# Patient Record
Sex: MALE | Race: WHITE | NOT HISPANIC OR LATINO
[De-identification: names, ages, dates, MRNs, and addresses within clinical notes are randomized per-mention and may not be internally consistent; named-entity substitution may affect disease eponyms.]

---

## 2023-03-06 ENCOUNTER — RESULT REVIEW (OUTPATIENT)
Age: 88
End: 2023-03-06

## 2023-03-07 ENCOUNTER — APPOINTMENT (OUTPATIENT)
Dept: DERMATOLOGY | Facility: CLINIC | Age: 88
End: 2023-03-07
Payer: MEDICARE

## 2023-03-07 PROCEDURE — 17262 DSTRJ MAL LES T/A/L 1.1-2.0: CPT

## 2023-03-07 PROCEDURE — 11102 TANGNTL BX SKIN SINGLE LES: CPT | Mod: 59

## 2023-03-07 PROCEDURE — 99203 OFFICE O/P NEW LOW 30 MIN: CPT | Mod: 25

## 2023-04-12 ENCOUNTER — APPOINTMENT (OUTPATIENT)
Dept: DERMATOLOGY | Facility: CLINIC | Age: 88
End: 2023-04-12
Payer: MEDICARE

## 2023-04-12 DIAGNOSIS — C44.42 SQUAMOUS CELL CARCINOMA OF SKIN OF SCALP AND NECK: ICD-10-CM

## 2023-04-12 PROCEDURE — 12042 INTMD RPR N-HF/GENIT2.6-7.5: CPT

## 2023-04-12 PROCEDURE — 17311 MOHS 1 STAGE H/N/HF/G: CPT

## 2023-04-12 RX ORDER — MUPIROCIN 20 MG/G
2 OINTMENT TOPICAL TWICE DAILY
Qty: 1 | Refills: 2 | Status: ACTIVE | COMMUNITY
Start: 2023-04-12 | End: 1900-01-01

## 2023-07-13 ENCOUNTER — APPOINTMENT (OUTPATIENT)
Dept: ORTHOPEDIC SURGERY | Facility: CLINIC | Age: 88
End: 2023-07-13
Payer: MEDICARE

## 2023-07-13 VITALS
DIASTOLIC BLOOD PRESSURE: 53 MMHG | SYSTOLIC BLOOD PRESSURE: 89 MMHG | HEART RATE: 91 BPM | TEMPERATURE: 97.1 F | WEIGHT: 165 LBS | HEIGHT: 71 IN | BODY MASS INDEX: 23.1 KG/M2

## 2023-07-13 DIAGNOSIS — M47.816 SPONDYLOSIS W/OUT MYELOPATHY OR RADICULOPATHY, LUMBAR REGION: ICD-10-CM

## 2023-07-13 DIAGNOSIS — S32.000A WEDGE COMPRESSION FRACTURE OF UNSPECIFIED LUMBAR VERTEBRA, INITIAL ENCOUNTER FOR CLOSED FRACTURE: ICD-10-CM

## 2023-07-13 PROCEDURE — 72100 X-RAY EXAM L-S SPINE 2/3 VWS: CPT

## 2023-07-13 PROCEDURE — 99204 OFFICE O/P NEW MOD 45 MIN: CPT

## 2023-07-13 NOTE — DISCUSSION/SUMMARY
[de-identified] : I had a great conversation with this gentleman 89-year-old franco has all of his facilities great conversation is accompanied by his son he has spinal stenosis he is Back pain which I think is multifactorial from lumbar degenerative disc disease spinal stenosis and myositis muscle tendon type issues I do not think this L1 1 compression fracture is symptomatic he states his back pain has been unchanged for many many years I would not operate on this at 89 I think the inherent risk of surgery is way too high physical therapy for gait and balance training soft tissue modalities anti-inflammatories under the guidance of his PCP I would also recommend consideration of injection therapy meaning I think trigger point injections facet blocks etc. may just provide this gentleman with a nice quality of life follow-up on a as needed basis

## 2023-07-13 NOTE — HISTORY OF PRESENT ILLNESS
[de-identified] : Very pleasant 89-year-old gentleman presents for evaluation and discussion of chronic low back pain he has been managed by Dr. Yusef Aden spinal stenosis physical therapy injections with mild improvement in his overall signs and symptoms overall biggest limiting factor is his age of 89 is very pleasant gentleman alert orientated x3 involved in orthopedic conversation I have seen a direct this in a nonoperative manner and we took our time to discuss the multifactorial nature of back pain as well as the multifactorial nature of treatment.  Only medication he takes with regard to his back is intermittent Aleve [Stable] : stable [0] : a current pain level of 0/10 [7] : a maximum pain level of 7/10 [Bending] : worsened by bending [Lifting] : worsened by lifting [Heat] : relieved by heat [NSAIDs] : relieved by nonsteroidal anti-inflammatory drugs [Ataxia] : no ataxia [Incontinence] : no incontinence [Loss of Dexterity] : good dexterity [Urinary Ret.] : no urinary retention

## 2023-07-13 NOTE — PHYSICAL EXAM
[Antalgic] : antalgic [de-identified] : CONSTITUTIONAL: The patient is a very pleasant individual who is well-nourished and who appears stated age.\par PSYCHIATRIC: The patient is alert and oriented X 3 and in no apparent distress, and participates with orthopedic evaluation well.\par HEAD: Atraumatic and is nonsyndromic in appearance.\par EENT: No visible thyromegaly, EOMI.\par RESPIRATORY: Respiratory rate is regular, not dyspneic on examination.\par LYMPHATICS: There is no inguinal lymphadenopathy\par INTEGUMENTARY: Skin is clean, dry, and intact about the bilateral lower extremities and lumbar spine.\par VASCULAR: There is brisk capillary refill about the bilateral lower extremities.\par NEUROLOGIC: There are no pathologic reflexes. There is no decrease in sensation of the bilateral lower extremities on Wartenberg pinwheel examination. Deep tendon reflexes are well maintained at 2+/4 of the bilateral lower extremities and are symmetric..\par MUSCULOSKELETAL: There is no visible muscular atrophy. Manual motor strength is well maintained in the bilateral lower extremities. Range of motion of lumbar spine is well maintained. The patient ambulates in a non-myelopathic manner. Negative tension sign and straight leg raise bilaterally. Quad extension, ankle dorsiflexion, EHL, plantar flexion, and ankle eversion are well preserved. Normal secondary orthopaedic exam of bilateral hips, greater trochanteric area, knees and ankles, mild mechanically orientated low back pain as well as neurogenic claudication signs and symptoms [de-identified] : X-rays have been reviewed on today's date July 13, 2023 demonstrates what appears to be an L1 compression fracture.\par \par Previous lumbar MRI has been reviewed that demonstrates multiple levels of lower lumbar spinal stenosis moderate to severe there is no evidence of a compression fracture.

## 2023-08-28 ENCOUNTER — EMERGENCY (EMERGENCY)
Facility: HOSPITAL | Age: 88
LOS: 1 days | Discharge: HOME | End: 2023-08-28
Attending: EMERGENCY MEDICINE
Payer: MEDICARE

## 2023-08-28 VITALS
TEMPERATURE: 98 F | SYSTOLIC BLOOD PRESSURE: 200 MMHG | HEART RATE: 106 BPM | WEIGHT: 173.5 LBS | OXYGEN SATURATION: 99 % | DIASTOLIC BLOOD PRESSURE: 75 MMHG | RESPIRATION RATE: 17 BRPM

## 2023-08-28 VITALS
TEMPERATURE: 98 F | OXYGEN SATURATION: 99 % | DIASTOLIC BLOOD PRESSURE: 97 MMHG | HEART RATE: 71 BPM | RESPIRATION RATE: 17 BRPM | SYSTOLIC BLOOD PRESSURE: 186 MMHG

## 2023-08-28 LAB
ALBUMIN SERPL ELPH-MCNC: 4.1 G/DL — SIGNIFICANT CHANGE UP (ref 3.3–5.2)
ALP SERPL-CCNC: 116 U/L — SIGNIFICANT CHANGE UP (ref 40–120)
ALT FLD-CCNC: 13 U/L — SIGNIFICANT CHANGE UP
ANION GAP SERPL CALC-SCNC: 13 MMOL/L — SIGNIFICANT CHANGE UP (ref 5–17)
AST SERPL-CCNC: 23 U/L — SIGNIFICANT CHANGE UP
BASOPHILS # BLD AUTO: 0.06 K/UL — SIGNIFICANT CHANGE UP (ref 0–0.2)
BASOPHILS NFR BLD AUTO: 0.6 % — SIGNIFICANT CHANGE UP (ref 0–2)
BILIRUB SERPL-MCNC: 0.6 MG/DL — SIGNIFICANT CHANGE UP (ref 0.4–2)
BUN SERPL-MCNC: 18.6 MG/DL — SIGNIFICANT CHANGE UP (ref 8–20)
CALCIUM SERPL-MCNC: 9.6 MG/DL — SIGNIFICANT CHANGE UP (ref 8.4–10.5)
CHLORIDE SERPL-SCNC: 100 MMOL/L — SIGNIFICANT CHANGE UP (ref 96–108)
CO2 SERPL-SCNC: 27 MMOL/L — SIGNIFICANT CHANGE UP (ref 22–29)
CREAT SERPL-MCNC: 0.8 MG/DL — SIGNIFICANT CHANGE UP (ref 0.5–1.3)
EGFR: 79 ML/MIN/1.73M2 — SIGNIFICANT CHANGE UP
EOSINOPHIL # BLD AUTO: 0.06 K/UL — SIGNIFICANT CHANGE UP (ref 0–0.5)
EOSINOPHIL NFR BLD AUTO: 0.6 % — SIGNIFICANT CHANGE UP (ref 0–6)
GLUCOSE SERPL-MCNC: 78 MG/DL — SIGNIFICANT CHANGE UP (ref 70–99)
HCT VFR BLD CALC: 44.2 % — SIGNIFICANT CHANGE UP (ref 39–50)
HGB BLD-MCNC: 14.2 G/DL — SIGNIFICANT CHANGE UP (ref 13–17)
IMM GRANULOCYTES NFR BLD AUTO: 0.5 % — SIGNIFICANT CHANGE UP (ref 0–0.9)
LYMPHOCYTES # BLD AUTO: 1.79 K/UL — SIGNIFICANT CHANGE UP (ref 1–3.3)
LYMPHOCYTES # BLD AUTO: 16.8 % — SIGNIFICANT CHANGE UP (ref 13–44)
MCHC RBC-ENTMCNC: 28.9 PG — SIGNIFICANT CHANGE UP (ref 27–34)
MCHC RBC-ENTMCNC: 32.1 GM/DL — SIGNIFICANT CHANGE UP (ref 32–36)
MCV RBC AUTO: 89.8 FL — SIGNIFICANT CHANGE UP (ref 80–100)
MONOCYTES # BLD AUTO: 1.07 K/UL — HIGH (ref 0–0.9)
MONOCYTES NFR BLD AUTO: 10.1 % — SIGNIFICANT CHANGE UP (ref 2–14)
NEUTROPHILS # BLD AUTO: 7.61 K/UL — HIGH (ref 1.8–7.4)
NEUTROPHILS NFR BLD AUTO: 71.4 % — SIGNIFICANT CHANGE UP (ref 43–77)
PLATELET # BLD AUTO: 284 K/UL — SIGNIFICANT CHANGE UP (ref 150–400)
POTASSIUM SERPL-MCNC: 4.4 MMOL/L — SIGNIFICANT CHANGE UP (ref 3.5–5.3)
POTASSIUM SERPL-SCNC: 4.4 MMOL/L — SIGNIFICANT CHANGE UP (ref 3.5–5.3)
PROT SERPL-MCNC: 7.3 G/DL — SIGNIFICANT CHANGE UP (ref 6.6–8.7)
RBC # BLD: 4.92 M/UL — SIGNIFICANT CHANGE UP (ref 4.2–5.8)
RBC # FLD: 13.4 % — SIGNIFICANT CHANGE UP (ref 10.3–14.5)
SODIUM SERPL-SCNC: 140 MMOL/L — SIGNIFICANT CHANGE UP (ref 135–145)
WBC # BLD: 10.64 K/UL — HIGH (ref 3.8–10.5)
WBC # FLD AUTO: 10.64 K/UL — HIGH (ref 3.8–10.5)

## 2023-08-28 PROCEDURE — 73610 X-RAY EXAM OF ANKLE: CPT

## 2023-08-28 PROCEDURE — 99284 EMERGENCY DEPT VISIT MOD MDM: CPT

## 2023-08-28 PROCEDURE — 80053 COMPREHEN METABOLIC PANEL: CPT

## 2023-08-28 PROCEDURE — 99283 EMERGENCY DEPT VISIT LOW MDM: CPT

## 2023-08-28 PROCEDURE — 85025 COMPLETE CBC W/AUTO DIFF WBC: CPT

## 2023-08-28 PROCEDURE — 73610 X-RAY EXAM OF ANKLE: CPT | Mod: 26,LT

## 2023-08-28 PROCEDURE — 36415 COLL VENOUS BLD VENIPUNCTURE: CPT

## 2023-08-28 RX ADMIN — Medication 100 MILLIGRAM(S): at 17:13

## 2023-08-28 NOTE — ED PROVIDER NOTE - NSFOLLOWUPINSTRUCTIONS_ED_ALL_ED_FT
intact/no unsafe behaviors demonstrated during evaluation
Cellulitis is a skin infection. The infected area is usually warm, red, swollen, and tender. This condition occurs most often in the arms and lower legs. The infection can travel to the muscles, blood, and underlying tissue and become serious. It is very important to get treated for this condition.    What are the causes?  Cellulitis is caused by bacteria. The bacteria enter through a break in the skin, such as a cut, burn, insect bite, open sore, or crack.    What increases the risk?  This condition is more likely to occur in people who:    Have a weak body defense system (immune system).  Have open wounds on the skin, such as cuts, burns, bites, and scrapes. Bacteria can enter the body through these open wounds.  Are older than 60 years of age.  Have diabetes.  Have a type of long-lasting (chronic) liver disease (cirrhosis) or kidney disease.  Are obese.  Have a skin condition such as:    Itchy rash (eczema).  Slow movement of blood in the veins (venous stasis).  Fluid buildup below the skin (edema).  Have had radiation therapy.  Use IV drugs.    What are the signs or symptoms?  Symptoms of this condition include:    Redness, streaking, or spotting on the skin.  Swollen area of the skin.  Tenderness or pain when an area of the skin is touched.  Warm skin.  A fever.  Chills.  Blisters.    How is this diagnosed?  This condition is diagnosed based on a medical history and physical exam. You may also have tests, including:    Blood tests.  Imaging tests.    How is this treated?  Treatment for this condition may include:    Medicines, such as antibiotic medicines or medicines to treat allergies (antihistamines).  Supportive care, such as rest and application of cold or warm cloths (compresses) to the skin.  Hospital care, if the condition is severe.    The infection usually starts to get better within 1–2 days of treatment.    Follow these instructions at home:         Medicines    Take over-the-counter and prescription medicines only as told by your health care provider.  If you were prescribed an antibiotic medicine, take it as told by your health care provider. Do not stop taking the antibiotic even if you start to feel better.        General instructions    Drink enough fluid to keep your urine pale yellow.  Do not touch or rub the infected area.  Raise (elevate) the infected area above the level of your heart while you are sitting or lying down.  Apply warm or cold compresses to the affected area as told by your health care provider.  Keep all follow-up visits as told by your health care provider. This is important. These visits let your health care provider make sure a more serious infection is not developing.    Contact a health care provider if:  You have a fever.  Your symptoms do not begin to improve within 1–2 days of starting treatment.  Your bone or joint underneath the infected area becomes painful after the skin has healed.  Your infection returns in the same area or another area.  You notice a swollen bump in the infected area.  You develop new symptoms.  You have a general ill feeling (malaise) with muscle aches and pains.    Get help right away if:  Your symptoms get worse.  You feel very sleepy.  You develop vomiting or diarrhea that persists.  You notice red streaks coming from the infected area.  Your red area gets larger or turns dark in color.    These symptoms may represent a serious problem that is an emergency. Do not wait to see if the symptoms will go away. Get medical help right away. Call your local emergency services (911 in the [place] Do not drive yourself to the hospital.    Summary  Cellulitis is a skin infection. This condition occurs most often in the arms and lower legs.  Treatment for this condition may include medicines, such as antibiotic medicines or antihistamines.  Take over-the-counter and prescription medicines only as told by your health care provider. If you were prescribed an antibiotic medicine, do not stop taking the antibiotic even if you start to feel better.  Contact a health care provider if your symptoms do not begin to improve within 1–2 days of starting treatment or your symptoms get worse.  Keep all follow-up visits as told by your health care provider. This is important. These visits let your health care provider make sure that a more serious infection is not developing.    ADDITIONAL NOTES AND INSTRUCTIONS    Please follow up with your Primary MD in 24-48 hr.  Seek immediate medical care for any new/worsening signs or symptoms.

## 2023-08-28 NOTE — ED PROVIDER NOTE - OBJECTIVE STATEMENT
Patient is a 89-year-old  male with no significant past medical history presenting with swelling and redness to left ankle.  Symptoms began approximately 1 month ago as a small ulceration which has continued to slowly spread to involve most of the ankle.  Patient denies any trauma, fevers, chills, nausea, vomiting.  Denies any history of diabetes.  No involvement of the foot.  Patient has tried topical bacitracin without significant relief.  Patient has not followed up with primary care doctor after initial evaluation and instructions to start bacitracin.  Patient denies any knee or other joint involvement

## 2023-08-28 NOTE — ED ADULT TRIAGE NOTE - CHIEF COMPLAINT QUOTE
non healing wound to left lower leg ; initial onset in 7/23  + edema , pt reports he does not recall how he got the wound.

## 2023-08-28 NOTE — ED PROVIDER NOTE - SKIN, MLM
LEFT ANKLE:  superficial ulceration surrounded by erythema to anterior ankle.  Surrounding tissue is mildly swollen and warm without tenderness.  No streaking or crepitus noted.  Full range of motion of ankle.  Left foot is neurovascularly intact

## 2023-08-28 NOTE — ED PROVIDER NOTE - CLINICAL SUMMARY MEDICAL DECISION MAKING FREE TEXT BOX
patient is a 89-year-old male presenting with likely cellulitis to ankle.  No signs of joint involvement.  Will obtain baseline labs, x-ray, p.o. antibiotics.  Likely discharge home with strict return precautions and close outpatient follow-up.  Patient very well-appearing

## 2023-08-28 NOTE — ED PROVIDER NOTE - PATIENT PORTAL LINK FT
You can access the FollowMyHealth Patient Portal offered by [facility] by registering at the following website: [email or web address]. By joining [facility]’s FollowMyHealth portal, you will also be able to view your health information using other applications (apps) compatible with our system.

## 2023-08-28 NOTE — ED ADULT NURSE NOTE - OBJECTIVE STATEMENT
Pt is A&Ox3. Respirations are even and unlabored. Color is appropriate for race. Skin warm and dry. Pt reports a left ankle wound. "It started as a small wound and my doctor sent me here since michelle had it for a month". Left ankle appears red and swollen. Pt denies any trauma, fever, nausea or vomiting. ED MD evaluating, plan of care ongoing.

## 2023-10-04 ENCOUNTER — EMERGENCY (EMERGENCY)
Facility: HOSPITAL | Age: 88
LOS: 1 days | Discharge: HOME | End: 2023-10-04
Attending: EMERGENCY MEDICINE
Payer: MEDICARE

## 2023-10-04 VITALS
HEART RATE: 98 BPM | OXYGEN SATURATION: 98 % | SYSTOLIC BLOOD PRESSURE: 155 MMHG | TEMPERATURE: 98 F | RESPIRATION RATE: 20 BRPM | DIASTOLIC BLOOD PRESSURE: 71 MMHG | WEIGHT: 149.25 LBS | HEIGHT: 71 IN

## 2023-10-04 DIAGNOSIS — L03.116 CELLULITIS OF LEFT LOWER LIMB: ICD-10-CM

## 2023-10-04 PROBLEM — Z78.9 OTHER SPECIFIED HEALTH STATUS: Chronic | Status: ACTIVE | Noted: 2023-08-28

## 2023-10-04 PROCEDURE — G0463: CPT

## 2023-10-04 PROCEDURE — 99282 EMERGENCY DEPT VISIT SF MDM: CPT

## 2023-10-04 PROCEDURE — L9995: CPT

## 2023-10-04 NOTE — ED PROVIDER NOTE - CLINICAL SUMMARY MEDICAL DECISION MAKING FREE TEXT BOX
89-year-old male denies past medical history presents ED for wound check. Wound evaluated, appears chronic, weeping, however no active pus drainage or surrounding erythema.  Patient denies any systemic symptoms such as fevers or chills and appears well.  Xeroform applied and wound redressed.  Wound care instructions given.  Follow-up for wound care specialist at [facility] contact information given to patient.  Since patient did not ever take course of Doxy as it was not covered by his insurance, will send course of clindamycin to patient's pharmacy and advised return precautions.

## 2023-10-04 NOTE — ED PROVIDER NOTE - OBJECTIVE STATEMENT
89-year-old male denies past medical history presents ED for wound check.  Son at bedside.  States that he has a chronic left lower extremity wound above the ankle.  States that they went to their PCP today who looked at the wound and told him to go to the ED for an evaluation because a lot worse.  Patient otherwise denies any complaints and states that he feels like his normal self.  Denies any fevers or chills.  States that he cleans and reapply the dressings to the area on a daily basis.  States that he was here within the last couple months for wound check and was Doxy however cannot  from the pharmacy because of this and complains insurance.  Patient otherwise denies any other complaints this time.

## 2023-10-04 NOTE — ED PROVIDER NOTE - PATIENT PORTAL LINK FT
You can access the FollowMyHealth Patient Portal offered by [facility] by registering at the following website: [email or web address]. By joining [facility]’s FollowMyHealth portal, you will also be able to view your health information using other applications (apps) compatible with our system.

## 2023-10-04 NOTE — ED ADULT TRIAGE NOTE - AS TEMP SITE
Consent 3/Introductory Paragraph: I gave the patient a chance to ask questions they had about the procedure.  Following this I explained the Mohs procedure and consent was obtained. The risks, benefits and alternatives to therapy were discussed in detail. Specifically, the risks of infection, scarring, bleeding, prolonged wound healing, incomplete removal, allergy to anesthesia, nerve injury and recurrence were addressed. Prior to the procedure, the treatment site was clearly identified and confirmed by the patient. All components of Universal Protocol/PAUSE Rule completed. oral

## 2023-10-04 NOTE — ED PROVIDER NOTE - PHYSICAL EXAMINATION
General: NAD  Head: NC, AT  EENT: no scleral icterus  Cardiac: no lower extremity edema  Respiratory: no respiratory distress   Abdomen: ND  MSK/Vascular: soft compartments, warm extremities  Neuro: sensation to light touch intact  Psych: calm, cooperative

## 2023-10-04 NOTE — ED PROVIDER NOTE - NS ED ROS FT
Constitutional: no fever, no chills  Head: NC, AT   Eyes: no redness   ENMT: no nasal congestion/drainage, no sore throat   CV: no chest pain, no edema  Resp: no cough, no dyspnea  GI: no abdominal pain, no nausea, no vomiting, no diarrhea  : no dysuria  Skin: + lesions, no rashes   Neuro: no LOC, no headache, no sensory deficits

## 2023-10-23 ENCOUNTER — INPATIENT (INPATIENT)
Facility: HOSPITAL | Age: 88
LOS: 10 days | Discharge: NURSING, REHAB OR LONG-TERM CARE | DRG: 478 | End: 2023-11-03
Attending: STUDENT IN AN ORGANIZED HEALTH CARE EDUCATION/TRAINING PROGRAM | Admitting: STUDENT IN AN ORGANIZED HEALTH CARE EDUCATION/TRAINING PROGRAM
Payer: MEDICARE

## 2023-10-23 VITALS
HEART RATE: 95 BPM | WEIGHT: 160.06 LBS | RESPIRATION RATE: 18 BRPM | HEIGHT: 71 IN | SYSTOLIC BLOOD PRESSURE: 120 MMHG | OXYGEN SATURATION: 95 % | TEMPERATURE: 98 F | DIASTOLIC BLOOD PRESSURE: 63 MMHG

## 2023-10-23 DIAGNOSIS — L03.116 CELLULITIS OF LEFT LOWER LIMB: ICD-10-CM

## 2023-10-23 DIAGNOSIS — Z98.890 OTHER SPECIFIED POSTPROCEDURAL STATES: Chronic | ICD-10-CM

## 2023-10-23 DIAGNOSIS — S81.802A UNSPECIFIED OPEN WOUND, LEFT LOWER LEG, INITIAL ENCOUNTER: ICD-10-CM

## 2023-10-23 DIAGNOSIS — Z29.9 ENCOUNTER FOR PROPHYLACTIC MEASURES, UNSPECIFIED: ICD-10-CM

## 2023-10-23 LAB
ALBUMIN SERPL ELPH-MCNC: 3 G/DL — LOW (ref 3.3–5)
ALBUMIN SERPL ELPH-MCNC: 3 G/DL — LOW (ref 3.3–5)
ALP SERPL-CCNC: 91 U/L — SIGNIFICANT CHANGE UP (ref 40–120)
ALP SERPL-CCNC: 91 U/L — SIGNIFICANT CHANGE UP (ref 40–120)
ALT FLD-CCNC: 23 U/L — SIGNIFICANT CHANGE UP (ref 12–78)
ALT FLD-CCNC: 23 U/L — SIGNIFICANT CHANGE UP (ref 12–78)
ANION GAP SERPL CALC-SCNC: 5 MMOL/L — SIGNIFICANT CHANGE UP (ref 5–17)
ANION GAP SERPL CALC-SCNC: 5 MMOL/L — SIGNIFICANT CHANGE UP (ref 5–17)
APTT BLD: 31.4 SEC — SIGNIFICANT CHANGE UP (ref 24.5–35.6)
APTT BLD: 31.4 SEC — SIGNIFICANT CHANGE UP (ref 24.5–35.6)
AST SERPL-CCNC: 31 U/L — SIGNIFICANT CHANGE UP (ref 15–37)
AST SERPL-CCNC: 31 U/L — SIGNIFICANT CHANGE UP (ref 15–37)
BASOPHILS # BLD AUTO: 0.05 K/UL — SIGNIFICANT CHANGE UP (ref 0–0.2)
BASOPHILS # BLD AUTO: 0.05 K/UL — SIGNIFICANT CHANGE UP (ref 0–0.2)
BASOPHILS NFR BLD AUTO: 0.5 % — SIGNIFICANT CHANGE UP (ref 0–2)
BASOPHILS NFR BLD AUTO: 0.5 % — SIGNIFICANT CHANGE UP (ref 0–2)
BILIRUB SERPL-MCNC: 0.5 MG/DL — SIGNIFICANT CHANGE UP (ref 0.2–1.2)
BILIRUB SERPL-MCNC: 0.5 MG/DL — SIGNIFICANT CHANGE UP (ref 0.2–1.2)
BUN SERPL-MCNC: 20 MG/DL — SIGNIFICANT CHANGE UP (ref 7–23)
BUN SERPL-MCNC: 20 MG/DL — SIGNIFICANT CHANGE UP (ref 7–23)
CALCIUM SERPL-MCNC: 8.9 MG/DL — SIGNIFICANT CHANGE UP (ref 8.5–10.1)
CALCIUM SERPL-MCNC: 8.9 MG/DL — SIGNIFICANT CHANGE UP (ref 8.5–10.1)
CHLORIDE SERPL-SCNC: 107 MMOL/L — SIGNIFICANT CHANGE UP (ref 96–108)
CHLORIDE SERPL-SCNC: 107 MMOL/L — SIGNIFICANT CHANGE UP (ref 96–108)
CO2 SERPL-SCNC: 31 MMOL/L — SIGNIFICANT CHANGE UP (ref 22–31)
CO2 SERPL-SCNC: 31 MMOL/L — SIGNIFICANT CHANGE UP (ref 22–31)
CREAT SERPL-MCNC: 1 MG/DL — SIGNIFICANT CHANGE UP (ref 0.5–1.3)
CREAT SERPL-MCNC: 1 MG/DL — SIGNIFICANT CHANGE UP (ref 0.5–1.3)
EGFR: 68 ML/MIN/1.73M2 — SIGNIFICANT CHANGE UP
EGFR: 68 ML/MIN/1.73M2 — SIGNIFICANT CHANGE UP
EOSINOPHIL # BLD AUTO: 0.05 K/UL — SIGNIFICANT CHANGE UP (ref 0–0.5)
EOSINOPHIL # BLD AUTO: 0.05 K/UL — SIGNIFICANT CHANGE UP (ref 0–0.5)
EOSINOPHIL NFR BLD AUTO: 0.5 % — SIGNIFICANT CHANGE UP (ref 0–6)
EOSINOPHIL NFR BLD AUTO: 0.5 % — SIGNIFICANT CHANGE UP (ref 0–6)
ERYTHROCYTE [SEDIMENTATION RATE] IN BLOOD: 23 MM/HR — HIGH (ref 0–20)
ERYTHROCYTE [SEDIMENTATION RATE] IN BLOOD: 23 MM/HR — HIGH (ref 0–20)
GLUCOSE SERPL-MCNC: 138 MG/DL — HIGH (ref 70–99)
GLUCOSE SERPL-MCNC: 138 MG/DL — HIGH (ref 70–99)
HCT VFR BLD CALC: 40.6 % — SIGNIFICANT CHANGE UP (ref 39–50)
HCT VFR BLD CALC: 40.6 % — SIGNIFICANT CHANGE UP (ref 39–50)
HGB BLD-MCNC: 13 G/DL — SIGNIFICANT CHANGE UP (ref 13–17)
HGB BLD-MCNC: 13 G/DL — SIGNIFICANT CHANGE UP (ref 13–17)
IMM GRANULOCYTES NFR BLD AUTO: 0.6 % — SIGNIFICANT CHANGE UP (ref 0–0.9)
IMM GRANULOCYTES NFR BLD AUTO: 0.6 % — SIGNIFICANT CHANGE UP (ref 0–0.9)
INR BLD: 1.16 RATIO — SIGNIFICANT CHANGE UP (ref 0.85–1.18)
INR BLD: 1.16 RATIO — SIGNIFICANT CHANGE UP (ref 0.85–1.18)
LACTATE SERPL-SCNC: 1.4 MMOL/L — SIGNIFICANT CHANGE UP (ref 0.7–2)
LACTATE SERPL-SCNC: 1.4 MMOL/L — SIGNIFICANT CHANGE UP (ref 0.7–2)
LYMPHOCYTES # BLD AUTO: 1.29 K/UL — SIGNIFICANT CHANGE UP (ref 1–3.3)
LYMPHOCYTES # BLD AUTO: 1.29 K/UL — SIGNIFICANT CHANGE UP (ref 1–3.3)
LYMPHOCYTES # BLD AUTO: 13.9 % — SIGNIFICANT CHANGE UP (ref 13–44)
LYMPHOCYTES # BLD AUTO: 13.9 % — SIGNIFICANT CHANGE UP (ref 13–44)
MCHC RBC-ENTMCNC: 27.6 PG — SIGNIFICANT CHANGE UP (ref 27–34)
MCHC RBC-ENTMCNC: 27.6 PG — SIGNIFICANT CHANGE UP (ref 27–34)
MCHC RBC-ENTMCNC: 32 GM/DL — SIGNIFICANT CHANGE UP (ref 32–36)
MCHC RBC-ENTMCNC: 32 GM/DL — SIGNIFICANT CHANGE UP (ref 32–36)
MCV RBC AUTO: 86.2 FL — SIGNIFICANT CHANGE UP (ref 80–100)
MCV RBC AUTO: 86.2 FL — SIGNIFICANT CHANGE UP (ref 80–100)
MONOCYTES # BLD AUTO: 1.12 K/UL — HIGH (ref 0–0.9)
MONOCYTES # BLD AUTO: 1.12 K/UL — HIGH (ref 0–0.9)
MONOCYTES NFR BLD AUTO: 12.1 % — SIGNIFICANT CHANGE UP (ref 2–14)
MONOCYTES NFR BLD AUTO: 12.1 % — SIGNIFICANT CHANGE UP (ref 2–14)
NEUTROPHILS # BLD AUTO: 6.69 K/UL — SIGNIFICANT CHANGE UP (ref 1.8–7.4)
NEUTROPHILS # BLD AUTO: 6.69 K/UL — SIGNIFICANT CHANGE UP (ref 1.8–7.4)
NEUTROPHILS NFR BLD AUTO: 72.4 % — SIGNIFICANT CHANGE UP (ref 43–77)
NEUTROPHILS NFR BLD AUTO: 72.4 % — SIGNIFICANT CHANGE UP (ref 43–77)
NRBC # BLD: 0 /100 WBCS — SIGNIFICANT CHANGE UP (ref 0–0)
NRBC # BLD: 0 /100 WBCS — SIGNIFICANT CHANGE UP (ref 0–0)
PLATELET # BLD AUTO: 316 K/UL — SIGNIFICANT CHANGE UP (ref 150–400)
PLATELET # BLD AUTO: 316 K/UL — SIGNIFICANT CHANGE UP (ref 150–400)
POTASSIUM SERPL-MCNC: 4.5 MMOL/L — SIGNIFICANT CHANGE UP (ref 3.5–5.3)
POTASSIUM SERPL-MCNC: 4.5 MMOL/L — SIGNIFICANT CHANGE UP (ref 3.5–5.3)
POTASSIUM SERPL-SCNC: 4.5 MMOL/L — SIGNIFICANT CHANGE UP (ref 3.5–5.3)
POTASSIUM SERPL-SCNC: 4.5 MMOL/L — SIGNIFICANT CHANGE UP (ref 3.5–5.3)
PROT SERPL-MCNC: 6.7 G/DL — SIGNIFICANT CHANGE UP (ref 6–8.3)
PROT SERPL-MCNC: 6.7 G/DL — SIGNIFICANT CHANGE UP (ref 6–8.3)
PROTHROM AB SERPL-ACNC: 13.5 SEC — HIGH (ref 9.5–13)
PROTHROM AB SERPL-ACNC: 13.5 SEC — HIGH (ref 9.5–13)
RBC # BLD: 4.71 M/UL — SIGNIFICANT CHANGE UP (ref 4.2–5.8)
RBC # BLD: 4.71 M/UL — SIGNIFICANT CHANGE UP (ref 4.2–5.8)
RBC # FLD: 13.4 % — SIGNIFICANT CHANGE UP (ref 10.3–14.5)
RBC # FLD: 13.4 % — SIGNIFICANT CHANGE UP (ref 10.3–14.5)
SODIUM SERPL-SCNC: 143 MMOL/L — SIGNIFICANT CHANGE UP (ref 135–145)
SODIUM SERPL-SCNC: 143 MMOL/L — SIGNIFICANT CHANGE UP (ref 135–145)
WBC # BLD: 9.26 K/UL — SIGNIFICANT CHANGE UP (ref 3.8–10.5)
WBC # BLD: 9.26 K/UL — SIGNIFICANT CHANGE UP (ref 3.8–10.5)
WBC # FLD AUTO: 9.26 K/UL — SIGNIFICANT CHANGE UP (ref 3.8–10.5)
WBC # FLD AUTO: 9.26 K/UL — SIGNIFICANT CHANGE UP (ref 3.8–10.5)

## 2023-10-23 PROCEDURE — 73630 X-RAY EXAM OF FOOT: CPT | Mod: 26,LT

## 2023-10-23 PROCEDURE — 73590 X-RAY EXAM OF LOWER LEG: CPT | Mod: 26,LT

## 2023-10-23 PROCEDURE — 99222 1ST HOSP IP/OBS MODERATE 55: CPT | Mod: GC

## 2023-10-23 PROCEDURE — 71045 X-RAY EXAM CHEST 1 VIEW: CPT | Mod: 26

## 2023-10-23 PROCEDURE — 99285 EMERGENCY DEPT VISIT HI MDM: CPT

## 2023-10-23 RX ORDER — PIPERACILLIN AND TAZOBACTAM 4; .5 G/20ML; G/20ML
3.38 INJECTION, POWDER, LYOPHILIZED, FOR SOLUTION INTRAVENOUS EVERY 8 HOURS
Refills: 0 | Status: DISCONTINUED | OUTPATIENT
Start: 2023-10-24 | End: 2023-10-24

## 2023-10-23 RX ORDER — PIPERACILLIN AND TAZOBACTAM 4; .5 G/20ML; G/20ML
3.38 INJECTION, POWDER, LYOPHILIZED, FOR SOLUTION INTRAVENOUS ONCE
Refills: 0 | Status: COMPLETED | OUTPATIENT
Start: 2023-10-23 | End: 2023-10-23

## 2023-10-23 RX ORDER — TAMSULOSIN HYDROCHLORIDE 0.4 MG/1
1 CAPSULE ORAL
Refills: 0 | DISCHARGE

## 2023-10-23 RX ORDER — ACETAMINOPHEN 500 MG
650 TABLET ORAL EVERY 6 HOURS
Refills: 0 | Status: DISCONTINUED | OUTPATIENT
Start: 2023-10-23 | End: 2023-10-31

## 2023-10-23 RX ORDER — MELOXICAM 15 MG/1
1 TABLET ORAL
Refills: 0 | DISCHARGE

## 2023-10-23 RX ORDER — VANCOMYCIN HCL 1 G
1000 VIAL (EA) INTRAVENOUS EVERY 12 HOURS
Refills: 0 | Status: DISCONTINUED | OUTPATIENT
Start: 2023-10-23 | End: 2023-10-24

## 2023-10-23 RX ORDER — VANCOMYCIN HCL 1 G
1000 VIAL (EA) INTRAVENOUS ONCE
Refills: 0 | Status: COMPLETED | OUTPATIENT
Start: 2023-10-23 | End: 2023-10-23

## 2023-10-23 RX ORDER — ENOXAPARIN SODIUM 100 MG/ML
40 INJECTION SUBCUTANEOUS EVERY 24 HOURS
Refills: 0 | Status: DISCONTINUED | OUTPATIENT
Start: 2023-10-23 | End: 2023-10-25

## 2023-10-23 RX ORDER — SODIUM CHLORIDE 9 MG/ML
1000 INJECTION INTRAMUSCULAR; INTRAVENOUS; SUBCUTANEOUS ONCE
Refills: 0 | Status: COMPLETED | OUTPATIENT
Start: 2023-10-23 | End: 2023-10-23

## 2023-10-23 RX ADMIN — SODIUM CHLORIDE 1000 MILLILITER(S): 9 INJECTION INTRAMUSCULAR; INTRAVENOUS; SUBCUTANEOUS at 21:48

## 2023-10-23 RX ADMIN — PIPERACILLIN AND TAZOBACTAM 200 GRAM(S): 4; .5 INJECTION, POWDER, LYOPHILIZED, FOR SOLUTION INTRAVENOUS at 21:49

## 2023-10-23 NOTE — ED PROVIDER NOTE - OBJECTIVE STATEMENT
89-year-old male with no significant past medical history presents to the emergency department with son with report of left lower extremity infection.  Patient states that he has had a wound in his left lower leg over the summer, some states that he took him to [facility] emergency department on 8/28/2023, was given a prescription for doxycycline but never took it, patient then returned again to the emergency department 10/4/2023 at which point given the prescription for clindamycin in which she only took 3-4 doses, son states that the last few days has been increased drainage from the wound reported to scented to the emergency department today.

## 2023-10-23 NOTE — H&P ADULT - HISTORY OF PRESENT ILLNESS
89-year-old male with no significant past medical history presents to the emergency department with son with report of left lower extremity infection.  Patient states that he has had a wound in his left lower leg over the summer, some states that he took him to [facility] emergency department on 8/28/2023, was given a prescription for doxycycline but never took it, patient then returned again to the emergency department 10/4/2023 at which point given the prescription for clindamycin in which she only took 3-4 doses, son states that the last few days has been increased drainage from the wound reported to [facility] to the emergency department today.    ED course  Vitals: T 97.7, HR 95, /63, RR 18, SpO2 95% on RA  Significant labs:   Imaging:   EKG:   In ED patient given:    89-year-old male with no significant past medical history presents to the emergency department with son with report of left lower extremity infection.  Patient states that he has had a wound in his left lower leg over the summer, some states that he took him to [facility] emergency department on 8/28/2023, was given a prescription for doxycycline but never took it, patient then returned again to the emergency department 10/4/2023 at which point given the prescription for clindamycin in which she only took 3-4 doses, son states that the last few days has been increased drainage from the wound reported to [facility] to the emergency department today.    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  Patient states that he has had a wound in his left lower leg over the summer, some states that he took him to [facility] emergency department on 8/28/2023. Pt was given a prescription for doxycycline but never took it. Patient then returned again to the emergency department 10/4/2023 at which point given the prescription for clindamycin in which he only took 3-4 doses. Son states that there has been increased drainage from the wound for the last couple of days, brought to the ED for further evaluation. Per pt, he cleans and dresses the wound daily. Has never seen podiatry in the past.     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  Patient states that he has had a wound in his left lower leg over the summer, some states that he took him to [facility] emergency department on 8/28/2023. Pt was given a prescription for doxycycline but never took it. Patient then returned again to the emergency department 10/4/2023 at which point given the prescription for clindamycin in which he only took 3-4 doses. Son states that there has been increased drainage from the wound for the last couple of days, brought to the ED for further evaluation. Per pt, he cleans and dresses the wound daily. Has never seen podiatry in the past.   ED course  Vitals: T 97.7, HR 95, /63, RR 18, SpO2 95% on RA  Significant labs: n/a  Imaging:   CXR wnl on personal read, mild increased vascular congestion; f/u official read   Left Foot XR : no acute fx seen; f/u official read   In ED patient given: Vanc x1, Zosyn x1

## 2023-10-23 NOTE — ED ADULT TRIAGE NOTE - CHIEF COMPLAINT QUOTE
Patient BIB son with c/o left leg wound check. Per son report patient has been seen at [address] for leg ulcers, son reports he was out of town and came on this weekend and patient seemed to have weeping from wound to the point where he is slipping on the floor there is so much drainage. Patient denies complaints.

## 2023-10-23 NOTE — ED PROVIDER NOTE - CLINICAL SUMMARY MEDICAL DECISION MAKING FREE TEXT BOX
89-year-old male with worsening wound of left lower extremity, noncompliant with outpatient antibiotics, to be admitted for IV antibiotics, send CBC, CMP, lactate level, blood cultures, obtain x-rays, EKG and admit.

## 2023-10-23 NOTE — H&P ADULT - PROBLEM SELECTOR PLAN 1
Pt presents to ED for LLE wound   - CXR wnl on personal read, mild increased vascular congestion; f/u official read   - Left Foot XR : no acute fx seen; f/u official read  - s/p Vanc x1, Zosyn x1, NS bolus x1  - continue vanc, zosyn   - f/u MR left foot   - f/u wound cx  - f/u BCX x2  - Podiatry consulted; f/u recs Pt presents to ED for LLE wound which has been present since 7/23.  - CXR wnl on personal read, mild increased vascular congestion; f/u official read   - Left Foot XR : no acute fx seen; f/u official read  - s/p Vanc x1, Zosyn x1, NS bolus x1  - continue vanc, zosyn   - f/u MR left foot   - f/u wound cx  - f/u BCX x2  - Podiatry consulted; f/u recs  - ID consult José Manuel

## 2023-10-23 NOTE — H&P ADULT - NSHPPHYSICALEXAM_GEN_ALL_CORE
T(C): 36.5 (10-23-23 @ 16:57), Max: 36.5 (10-23-23 @ 16:57)  HR: 95 (10-23-23 @ 16:57) (95 - 95)  BP: 120/63 (10-23-23 @ 16:57) (120/63 - 120/63)  RR: 18 (10-23-23 @ 16:57) (18 - 18)  SpO2: 95% (10-23-23 @ 16:57) (95% - 95%)    GENERAL: patient appears well, no acute distress, appropriately interactive  EYES: sclera clear, no exudates  ENMT: oropharynx clear without erythema, no exudates, moist mucous membranes  NECK: supple, soft  LUNGS: good air entry bilaterally, clear to auscultation, symmetric breath sounds, no wheezing or rhonchi appreciated  HEART: soft S1/S2, regular rate and rhythm, no murmurs noted, no lower extremity edema  GASTROINTESTINAL: abdomen is soft, nontender, nondistended, normoactive bowel sounds  INTEGUMENT: good skin turgor, warm skin, appears well perfused  MUSCULOSKELETAL: no clubbing or cyanosis, no obvious deformity  NEUROLOGIC: awake, alert, oriented x3, good muscle tone in all 4 extremities  HEME/LYMPH: no obvious ecchymosis or petechiae T(C): 36.5 (10-23-23 @ 16:57), Max: 36.5 (10-23-23 @ 16:57)  HR: 95 (10-23-23 @ 16:57) (95 - 95)  BP: 120/63 (10-23-23 @ 16:57) (120/63 - 120/63)  RR: 18 (10-23-23 @ 16:57) (18 - 18)  SpO2: 95% (10-23-23 @ 16:57) (95% - 95%)    GENERAL: patient appears well, no acute distress, appropriately interactive  EYES: sclera clear, no exudates  ENMT: oropharynx clear without erythema, no exudates, moist mucous membranes  NECK: supple, soft  LUNGS: good air entry bilaterally, clear to auscultation, symmetric breath sounds, no wheezing or rhonchi appreciated  HEART: soft S1/S2, regular rate and rhythm, no murmurs noted, no lower extremity edema  GASTROINTESTINAL: abdomen is soft, nontender, nondistended, normoactive bowel sounds  INTEGUMENT: good skin turgor, warm skin, appears well perfused  MUSCULOSKELETAL: +LLE macerated, open, draining wound; no clubbing or cyanosis, no obvious deformity  NEUROLOGIC: awake, alert, oriented x3, good muscle tone in all 4 extremities  HEME/LYMPH: no obvious ecchymosis or petechiae T(C): 36.5 (10-23-23 @ 16:57), Max: 36.5 (10-23-23 @ 16:57)  HR: 95 (10-23-23 @ 16:57) (95 - 95)  BP: 120/63 (10-23-23 @ 16:57) (120/63 - 120/63)  RR: 18 (10-23-23 @ 16:57) (18 - 18)  SpO2: 95% (10-23-23 @ 16:57) (95% - 95%)  GENERAL: patient appears well, no acute distress, appropriately interactive  EYES: sclera clear, no exudates  ENMT: oropharynx clear without erythema, no exudates, moist mucous membranes  NECK: supple, soft  LUNGS: good air entry bilaterally, clear to auscultation, symmetric breath sounds, no wheezing or rhonchi appreciated  HEART: soft S1/S2, regular rate and rhythm, no murmurs noted, no lower extremity edema  GASTROINTESTINAL: abdomen is soft, nontender, nondistended, normoactive bowel sounds  INTEGUMENT: good skin turgor, warm skin, appears well perfused  MUSCULOSKELETAL: +LLE macerated, open, draining wound; no clubbing or cyanosis, no obvious deformity  NEUROLOGIC: awake, alert, oriented x3, good muscle tone in all 4 extremities  HEME/LYMPH: no obvious ecchymosis or petechiae

## 2023-10-23 NOTE — H&P ADULT - NSHPSOCIALHISTORY_GEN_ALL_CORE
Tobacco:   EtOH:    Recreational drug use:   Lives with:   Ambulates:   ADLs: Tobacco: denies  EtOH:  denies  Recreational drug use: denies  Lives with: alone  Ambulates: independent; occasional cane/walker use  ADLs: independent

## 2023-10-23 NOTE — H&P ADULT - NSHPREVIEWOFSYSTEMS_GEN_ALL_CORE
Psychiatry Discharge Summary    Admission Date: 10/8/2021     Discharge Date: 10/12/2021    Attending Physician: Dr. Minor Malik    Patient Stated Reason for Admission:                                \"I didn't want to live anymore\"    Discharge Diagnoses:   Major depressive disorder, severe, recurrent, without psychotic features  Generalized Anxiety Disorder  Alcohol Use Disorder    HPI & Hospital Course:     Spring Gomez is a 40 year old Anguillan speaking female, with past psychiatric history of alcohol use disorder, depression, anxiety, post partum depression who presented to [facility] ED on 10/05/21 due to worsening depression and suicidal ideation.  Per the medical records, patient made suicidal statements to her brother who called EMS after she stated she didn't want to live anymore. This was in the context of drinking more over last few months. ED course was non overly contributory: Was tachycardic and hypertensive, . Utox negative. Hgb 11. LFTs normal. Spring was transferred to the medical floor at [facility] for detox. She did not require any ativan while hospitalized and was deemed stable for discharge to inpatient psychiatry.     Upon initial assessment, patient stated she had worsening symptoms of depression for the past few months which brought her into the hospital. She did not remember making suicidal statements but stated she was likely intoxicated at the time. Per chart review she was texting multiple family members she didn't want to live anymore. She denied having any plan and was glad to be alive. She felt she reached out to family members for help and denied wanting to act on SI at that time. She denied any thoughts of SI in the past, SA in the past, no family hx of Suicide. She reportedly came to the ED for alcohol detox because she felt her drinking had worsened over last few months, self medicating for symptoms of anxiety/depression with alcohol, and wanted help. She  did note a history of post partum depression 20 years prior and felt that counseling at that time helped greatly.      Patient stated she had worsening anxiety/depression that began about 10 years prior when her mother passed away, then about 5 years prior when she was . This had caused a great deal of stress recently because of the legal precedings and financial strain. She reported working two jobs then coming home and trying to sleep but instead felt extremely anxious and would self medicate with alcohol. Patient felt the issues with depression/anxiety predated alcohol use. She reported a month and a half of sobriety when the COVID-19 pandemic began as she was exercising consistently. Afterwards she had little work so began drinking again.     Per chart review, collateral information was obtained at [facility] from patients brother, Miller Grover Number , stated pt had been texting and telling numerous relatives she did not want to live anymore and had been more suicidal, had been drinking more recently and more depressed. He had safety concerns at that time.    Spring was admitted to 5th Floor, Inpatient Psychiatry, under [facility] psychiatry. Suicide Precautions placed given the history and risk factors. Hospitalist was consulted for medical management. Individual and group psychotherapy were started which patient tolerated well. Medication options were discussed and after informed consent was given, pt was amenable to initiation and optimization. Mirtazapine was increased to 30mg at bedtime, gabapentin to 300mg TID, and naltrexone started at 25mg at bedtime with plan to finish the titration as an outpatient. Pt's symptoms improved over the course of admission. Mood and depressive symptoms improved, sleep/appetite improved. Denies SI/HI/intent/plan during course of admission. Stable for discharge on 10/12/2021    Consults: Hospitalist    OBJECTIVE:     Vitals:      Visit Vitals  /85   Pulse 83    Temp 98.1 °F (36.7 °C) (Oral)   Resp 16   Ht 5' (1.524 m)   Wt 56.8 kg (125 lb 3.5 oz)   LMP 09/05/2021 (Approximate)   SpO2 98%   BMI 24.46 kg/m²       Recent Labs:      Allergies:  ALLERGIES:  No Known Allergies    Discharge medication list:         Summary of your Discharge Medications      Take these Medications      Details   gabapentin 300 MG capsule  Commonly known as: NEURONTIN   Take 1 capsule by mouth 3 times daily.     mirtazapine 30 MG tablet  Commonly known as: REMERON   Take 1 tablet by mouth nightly.     nalTREXone 50 MG tablet  Commonly known as: REVIA   Take 1 tablet by mouth at bedtime.             Discharge/Mental Status Exam:  Appearance- Appears stated age, good grooming and hygiene  Behavior/Attitude- Calm, cooperative. Good eye contact  Motor- No psychomotor agitation/retardation, no tics/tremors or other abnormal movements.   Speech- Regular rate and rhythm, normal tone, non-pressured.   Mood- so much better  Affect- euthymic, normal range, non labile. Congruent. Appropriate  Thought Process- Linear, logical, goal-directed.   Thought Content- Denies SI, HI, paranoid or delusional ideation.   Perceptions - No evidence of response to internal stimuli. Denies, and no evidence of, AVH.   Insight- Good  Judgment-Good  General Cognition- A&Ox3, good concentration, memory intact. Good fund of knowledge. Good functional knowledge.     Discharge Instructions:  Patient was seen on day of discharge. Patient denied suicidality or safety concerns. Patient instructed to seek emergent psychiatric evaluation given occurrence of suicidality or other acute presentation of psychiatric illness. Patient voiced understanding. Patient's brother was called on 10/11/2021 who reported no safety concerns with patient's discharge. Patient plans psychiatric follow up with [facility] and was offered assistance to schedule an intake. A 30 day supply of vin medications were given to the patient.      Minor Malik D.O.  [facility]  Please perfectserve with questions    This communication was assisted with Dragon assisted speech recognition. If there are any typographical errors or ambiguities in the dictation please contact the provider for further information or correction.    Over 30 minutes were spent on the discharge related services on day of discharge     CONSTITUTIONAL: denies fever, chills, fatigue, weakness  HEENT: denies blurred vision, sore throat  SKIN: denies new lesions, rash  CARDIOVASCULAR: denies chest pain, chest pressure, palpitations  RESPIRATORY: denies shortness of breath, cough, sputum production  GASTROINTESTINAL: denies nausea, vomiting, diarrhea, abdominal pain, melena or hematochezia  GENITOURINARY: denies dysuria, discharge  NEUROLOGICAL: denies numbness, headache, focal weakness  MUSCULOSKELETAL: denies new joint pain, muscle aches  HEMATOLOGIC: denies gross bleeding, bruising (1) obesity (BMI greater than 25) CONSTITUTIONAL: denies fever, chills, fatigue, weakness  HEENT: denies blurred vision, sore throat  SKIN: denies new lesions, rash  CARDIOVASCULAR: denies chest pain, chest pressure, palpitations  RESPIRATORY: denies shortness of breath, cough, sputum production  GASTROINTESTINAL: denies nausea, vomiting, diarrhea, abdominal pain, melena or hematochezia  GENITOURINARY: denies dysuria, discharge  NEUROLOGICAL: denies numbness, headache, focal weakness  MUSCULOSKELETAL: +draining left lower extremity wound; denies new joint pain, muscle aches  HEMATOLOGIC: denies gross bleeding, bruising

## 2023-10-23 NOTE — H&P ADULT - ASSESSMENT
89-year-old male with PMHx of SCC presents to the emergency department with son with report of left lower extremity infection.  Admitted for LLE wound; r/o OM.

## 2023-10-23 NOTE — H&P ADULT - ATTENDING COMMENTS
89-year-old male with PMHx of SCC presents to the emergency department with son with report of left lower extremity infection.  Admitted for LLE wound; r/o OM.    Agree with above. Edited where appropriate

## 2023-10-24 DIAGNOSIS — S91.002A UNSPECIFIED OPEN WOUND, LEFT ANKLE, INITIAL ENCOUNTER: ICD-10-CM

## 2023-10-24 DIAGNOSIS — M48.061 SPINAL STENOSIS, LUMBAR REGION WITHOUT NEUROGENIC CLAUDICATION: ICD-10-CM

## 2023-10-24 DIAGNOSIS — R73.03 PREDIABETES: ICD-10-CM

## 2023-10-24 DIAGNOSIS — S91.109A UNSPECIFIED OPEN WOUND OF UNSPECIFIED TOE(S) WITHOUT DAMAGE TO NAIL, INITIAL ENCOUNTER: ICD-10-CM

## 2023-10-24 DIAGNOSIS — S91.302A UNSPECIFIED OPEN WOUND, LEFT FOOT, INITIAL ENCOUNTER: ICD-10-CM

## 2023-10-24 DIAGNOSIS — D72.829 ELEVATED WHITE BLOOD CELL COUNT, UNSPECIFIED: ICD-10-CM

## 2023-10-24 DIAGNOSIS — N40.0 BENIGN PROSTATIC HYPERPLASIA WITHOUT LOWER URINARY TRACT SYMPTOMS: ICD-10-CM

## 2023-10-24 LAB
A1C WITH ESTIMATED AVERAGE GLUCOSE RESULT: 6.3 % — HIGH (ref 4–5.6)
A1C WITH ESTIMATED AVERAGE GLUCOSE RESULT: 6.3 % — HIGH (ref 4–5.6)
ALBUMIN SERPL ELPH-MCNC: 2.7 G/DL — LOW (ref 3.3–5)
ALBUMIN SERPL ELPH-MCNC: 2.7 G/DL — LOW (ref 3.3–5)
ALP SERPL-CCNC: 76 U/L — SIGNIFICANT CHANGE UP (ref 40–120)
ALP SERPL-CCNC: 76 U/L — SIGNIFICANT CHANGE UP (ref 40–120)
ALT FLD-CCNC: 20 U/L — SIGNIFICANT CHANGE UP (ref 12–78)
ALT FLD-CCNC: 20 U/L — SIGNIFICANT CHANGE UP (ref 12–78)
ANION GAP SERPL CALC-SCNC: 4 MMOL/L — LOW (ref 5–17)
ANION GAP SERPL CALC-SCNC: 4 MMOL/L — LOW (ref 5–17)
AST SERPL-CCNC: 26 U/L — SIGNIFICANT CHANGE UP (ref 15–37)
AST SERPL-CCNC: 26 U/L — SIGNIFICANT CHANGE UP (ref 15–37)
BASOPHILS # BLD AUTO: 0.05 K/UL — SIGNIFICANT CHANGE UP (ref 0–0.2)
BASOPHILS # BLD AUTO: 0.05 K/UL — SIGNIFICANT CHANGE UP (ref 0–0.2)
BASOPHILS NFR BLD AUTO: 0.4 % — SIGNIFICANT CHANGE UP (ref 0–2)
BASOPHILS NFR BLD AUTO: 0.4 % — SIGNIFICANT CHANGE UP (ref 0–2)
BILIRUB SERPL-MCNC: 0.7 MG/DL — SIGNIFICANT CHANGE UP (ref 0.2–1.2)
BILIRUB SERPL-MCNC: 0.7 MG/DL — SIGNIFICANT CHANGE UP (ref 0.2–1.2)
BUN SERPL-MCNC: 18 MG/DL — SIGNIFICANT CHANGE UP (ref 7–23)
BUN SERPL-MCNC: 18 MG/DL — SIGNIFICANT CHANGE UP (ref 7–23)
CALCIUM SERPL-MCNC: 8.6 MG/DL — SIGNIFICANT CHANGE UP (ref 8.5–10.1)
CALCIUM SERPL-MCNC: 8.6 MG/DL — SIGNIFICANT CHANGE UP (ref 8.5–10.1)
CHLORIDE SERPL-SCNC: 110 MMOL/L — HIGH (ref 96–108)
CHLORIDE SERPL-SCNC: 110 MMOL/L — HIGH (ref 96–108)
CO2 SERPL-SCNC: 29 MMOL/L — SIGNIFICANT CHANGE UP (ref 22–31)
CO2 SERPL-SCNC: 29 MMOL/L — SIGNIFICANT CHANGE UP (ref 22–31)
CREAT SERPL-MCNC: 1 MG/DL — SIGNIFICANT CHANGE UP (ref 0.5–1.3)
CREAT SERPL-MCNC: 1 MG/DL — SIGNIFICANT CHANGE UP (ref 0.5–1.3)
CRP SERPL-MCNC: 41 MG/L — HIGH
CRP SERPL-MCNC: 41 MG/L — HIGH
EGFR: 68 ML/MIN/1.73M2 — SIGNIFICANT CHANGE UP
EGFR: 68 ML/MIN/1.73M2 — SIGNIFICANT CHANGE UP
EOSINOPHIL # BLD AUTO: 0.1 K/UL — SIGNIFICANT CHANGE UP (ref 0–0.5)
EOSINOPHIL # BLD AUTO: 0.1 K/UL — SIGNIFICANT CHANGE UP (ref 0–0.5)
EOSINOPHIL NFR BLD AUTO: 0.8 % — SIGNIFICANT CHANGE UP (ref 0–6)
EOSINOPHIL NFR BLD AUTO: 0.8 % — SIGNIFICANT CHANGE UP (ref 0–6)
ESTIMATED AVERAGE GLUCOSE: 134 MG/DL — HIGH (ref 68–114)
ESTIMATED AVERAGE GLUCOSE: 134 MG/DL — HIGH (ref 68–114)
GLUCOSE SERPL-MCNC: 133 MG/DL — HIGH (ref 70–99)
GLUCOSE SERPL-MCNC: 133 MG/DL — HIGH (ref 70–99)
HCT VFR BLD CALC: 37.2 % — LOW (ref 39–50)
HCT VFR BLD CALC: 37.2 % — LOW (ref 39–50)
HGB BLD-MCNC: 12 G/DL — LOW (ref 13–17)
HGB BLD-MCNC: 12 G/DL — LOW (ref 13–17)
IMM GRANULOCYTES NFR BLD AUTO: 0.4 % — SIGNIFICANT CHANGE UP (ref 0–0.9)
IMM GRANULOCYTES NFR BLD AUTO: 0.4 % — SIGNIFICANT CHANGE UP (ref 0–0.9)
LYMPHOCYTES # BLD AUTO: 1.42 K/UL — SIGNIFICANT CHANGE UP (ref 1–3.3)
LYMPHOCYTES # BLD AUTO: 1.42 K/UL — SIGNIFICANT CHANGE UP (ref 1–3.3)
LYMPHOCYTES # BLD AUTO: 11.8 % — LOW (ref 13–44)
LYMPHOCYTES # BLD AUTO: 11.8 % — LOW (ref 13–44)
MCHC RBC-ENTMCNC: 27.7 PG — SIGNIFICANT CHANGE UP (ref 27–34)
MCHC RBC-ENTMCNC: 27.7 PG — SIGNIFICANT CHANGE UP (ref 27–34)
MCHC RBC-ENTMCNC: 32.3 GM/DL — SIGNIFICANT CHANGE UP (ref 32–36)
MCHC RBC-ENTMCNC: 32.3 GM/DL — SIGNIFICANT CHANGE UP (ref 32–36)
MCV RBC AUTO: 85.9 FL — SIGNIFICANT CHANGE UP (ref 80–100)
MCV RBC AUTO: 85.9 FL — SIGNIFICANT CHANGE UP (ref 80–100)
MONOCYTES # BLD AUTO: 1.46 K/UL — HIGH (ref 0–0.9)
MONOCYTES # BLD AUTO: 1.46 K/UL — HIGH (ref 0–0.9)
MONOCYTES NFR BLD AUTO: 12.1 % — SIGNIFICANT CHANGE UP (ref 2–14)
MONOCYTES NFR BLD AUTO: 12.1 % — SIGNIFICANT CHANGE UP (ref 2–14)
NEUTROPHILS # BLD AUTO: 8.99 K/UL — HIGH (ref 1.8–7.4)
NEUTROPHILS # BLD AUTO: 8.99 K/UL — HIGH (ref 1.8–7.4)
NEUTROPHILS NFR BLD AUTO: 74.5 % — SIGNIFICANT CHANGE UP (ref 43–77)
NEUTROPHILS NFR BLD AUTO: 74.5 % — SIGNIFICANT CHANGE UP (ref 43–77)
NRBC # BLD: 0 /100 WBCS — SIGNIFICANT CHANGE UP (ref 0–0)
NRBC # BLD: 0 /100 WBCS — SIGNIFICANT CHANGE UP (ref 0–0)
PLATELET # BLD AUTO: 288 K/UL — SIGNIFICANT CHANGE UP (ref 150–400)
PLATELET # BLD AUTO: 288 K/UL — SIGNIFICANT CHANGE UP (ref 150–400)
POTASSIUM SERPL-MCNC: 4.5 MMOL/L — SIGNIFICANT CHANGE UP (ref 3.5–5.3)
POTASSIUM SERPL-MCNC: 4.5 MMOL/L — SIGNIFICANT CHANGE UP (ref 3.5–5.3)
POTASSIUM SERPL-SCNC: 4.5 MMOL/L — SIGNIFICANT CHANGE UP (ref 3.5–5.3)
POTASSIUM SERPL-SCNC: 4.5 MMOL/L — SIGNIFICANT CHANGE UP (ref 3.5–5.3)
PROT SERPL-MCNC: 5.8 G/DL — LOW (ref 6–8.3)
PROT SERPL-MCNC: 5.8 G/DL — LOW (ref 6–8.3)
RBC # BLD: 4.33 M/UL — SIGNIFICANT CHANGE UP (ref 4.2–5.8)
RBC # BLD: 4.33 M/UL — SIGNIFICANT CHANGE UP (ref 4.2–5.8)
RBC # FLD: 13.5 % — SIGNIFICANT CHANGE UP (ref 10.3–14.5)
RBC # FLD: 13.5 % — SIGNIFICANT CHANGE UP (ref 10.3–14.5)
SODIUM SERPL-SCNC: 143 MMOL/L — SIGNIFICANT CHANGE UP (ref 135–145)
SODIUM SERPL-SCNC: 143 MMOL/L — SIGNIFICANT CHANGE UP (ref 135–145)
WBC # BLD: 12.07 K/UL — HIGH (ref 3.8–10.5)
WBC # BLD: 12.07 K/UL — HIGH (ref 3.8–10.5)
WBC # FLD AUTO: 12.07 K/UL — HIGH (ref 3.8–10.5)
WBC # FLD AUTO: 12.07 K/UL — HIGH (ref 3.8–10.5)

## 2023-10-24 PROCEDURE — 99221 1ST HOSP IP/OBS SF/LOW 40: CPT

## 2023-10-24 PROCEDURE — 93010 ELECTROCARDIOGRAM REPORT: CPT

## 2023-10-24 PROCEDURE — 99233 SBSQ HOSP IP/OBS HIGH 50: CPT

## 2023-10-24 PROCEDURE — 93926 LOWER EXTREMITY STUDY: CPT | Mod: 26,LT

## 2023-10-24 RX ORDER — LANOLIN ALCOHOL/MO/W.PET/CERES
5 CREAM (GRAM) TOPICAL ONCE
Refills: 0 | Status: COMPLETED | OUTPATIENT
Start: 2023-10-24 | End: 2023-10-24

## 2023-10-24 RX ORDER — CEFEPIME 1 G/1
2000 INJECTION, POWDER, FOR SOLUTION INTRAMUSCULAR; INTRAVENOUS EVERY 12 HOURS
Refills: 0 | Status: DISCONTINUED | OUTPATIENT
Start: 2023-10-24 | End: 2023-10-26

## 2023-10-24 RX ORDER — TAMSULOSIN HYDROCHLORIDE 0.4 MG/1
0.4 CAPSULE ORAL AT BEDTIME
Refills: 0 | Status: DISCONTINUED | OUTPATIENT
Start: 2023-10-24 | End: 2023-10-31

## 2023-10-24 RX ORDER — VANCOMYCIN HCL 1 G
1000 VIAL (EA) INTRAVENOUS EVERY 24 HOURS
Refills: 0 | Status: DISCONTINUED | OUTPATIENT
Start: 2023-10-24 | End: 2023-10-26

## 2023-10-24 RX ADMIN — ENOXAPARIN SODIUM 40 MILLIGRAM(S): 100 INJECTION SUBCUTANEOUS at 06:39

## 2023-10-24 RX ADMIN — Medication 250 MILLIGRAM(S): at 21:24

## 2023-10-24 RX ADMIN — Medication 250 MILLIGRAM(S): at 00:20

## 2023-10-24 RX ADMIN — CEFEPIME 100 MILLIGRAM(S): 1 INJECTION, POWDER, FOR SOLUTION INTRAMUSCULAR; INTRAVENOUS at 18:38

## 2023-10-24 RX ADMIN — TAMSULOSIN HYDROCHLORIDE 0.4 MILLIGRAM(S): 0.4 CAPSULE ORAL at 21:24

## 2023-10-24 RX ADMIN — PIPERACILLIN AND TAZOBACTAM 25 GRAM(S): 4; .5 INJECTION, POWDER, LYOPHILIZED, FOR SOLUTION INTRAVENOUS at 06:39

## 2023-10-24 RX ADMIN — Medication 5 MILLIGRAM(S): at 23:32

## 2023-10-24 NOTE — PROGRESS NOTE ADULT - PROBLEM SELECTOR PLAN 1
LLE cellulitis, significant drainage, circumferentially affected the ankle  - change abx to cefepime (from zosyn), cont vanco - monitor closely for toxicity, monitor lytes and renal indices closely  - elevated inflammatory markers  - MR L foot is ordered  - f/u wound cx - collected and pending in the lab  - f/u BCX x2  - podiatry and ID to assess

## 2023-10-24 NOTE — CONSULT NOTE ADULT - PROBLEM SELECTOR RECOMMENDATION 9
Patient examined and evaluated at this time.  Patient will require surgical intervention in the operating room for debridement of the left lower extremity wounds.  We will discuss with patient's family regarding expectations of treatment outcomes.  Will await MRI of the left foot and ankle to rule out osteomyelitis.  Patient remains high risk for infection, sepsis, amputation, limb loss, death.  Patient will require vascular consultation for evaluation for viability of the left lower extremity.

## 2023-10-24 NOTE — ED ADULT NURSE REASSESSMENT NOTE - NS ED NURSE REASSESS COMMENT FT1
Pt received in bed alert and resting in bed. Pt report received from previous nurse. Pt admitted and safety maintained. Nursing care ongoing and safety maintained.

## 2023-10-24 NOTE — ED ADULT NURSE NOTE - NSFALLHARMRISKINTERV_ED_ALL_ED
Assistance OOB with selected safe patient handling equipment if applicable/Communicate risk of Fall with Harm to all staff, patient, and family/Monitor gait and stability/Provide patient with walking aids/Provide visual cue: red socks, yellow wristband, yellow gown, etc/Reinforce activity limits and safety measures with patient and family/Bed in lowest position, wheels locked, appropriate side rails in place/Call bell, personal items and telephone in reach/Instruct patient to call for assistance before getting out of bed/chair/stretcher/Non-slip footwear applied when patient is off stretcher/[place] to call system/Physically safe environment - no spills, clutter or unnecessary equipment/Purposeful Proactive Rounding/Room/bathroom lighting operational, light cord in reach

## 2023-10-24 NOTE — ED ADULT NURSE REASSESSMENT NOTE - NSFALLHARMRISKINTERV_ED_ALL_ED

## 2023-10-24 NOTE — PROGRESS NOTE ADULT - TIME BILLING
spoke w/ ID  spoke w/ family who will visit later today  educated on MRI plan, abx, lab results including a1c and wbc  ordered arterial dopp - may need vasc  monitor for toxicity of abx  further recs pending course

## 2023-10-24 NOTE — PROGRESS NOTE ADULT - SUBJECTIVE AND OBJECTIVE BOX
Name: ALICIA HERNANDEZ  MRN: [id number]  LOCATION: [facility] 01    ----  Patient is a 89y old  Male who presents with a chief complaint of Left leg wound (23 Oct 2023 22:28)      FROM ADMISSION H+P:   HPI:  89-year-old male with PMHx of SCC presents to the emergency department with son with report of left lower extremity infection     ----  INTERVAL HPI/OVERNIGHT EVENTS: Pt seen and evaluated at the bedside. No acute overnight events occurred. Pt is a limited historian due to significant [place] b/l. The pt states "I've been here for 3 days getting treatment, do you know how much longer it will be until I get some results?" Pt was admitted overnight last night. He denies pain. He states that he's had this wound for a few days. No fever/chills. No CP, palpitations. He is sleepy and did not sleep well on the ED stretcher. No other focal complaint at present.     ----  PAST MEDICAL & SURGICAL HISTORY:  No pertinent past medical history      H/O hernia repair          FAMILY HISTORY:  No pertinent family history in first degree relatives        Allergies    No Known Allergies    Intolerances        ----  REVIEW OF SYSTEMS:  CONSTITUTIONAL: denies fever, chill   HEENT: denies blurred vision, sore throat  CARDIOVASCULAR: denies chest pain, palpitations  RESPIRATORY: denies shortness of breath, sputum production, cough  GASTROINTESTINAL: denies nausea, vomiting - has good appetite  NEUROLOGICAL: denies numbness, headache  MUSCULOSKELETAL: denies new joint pain, muscle aches    ----  PHYSICAL EXAM:  GENERAL: patient appears advanced age, thin  ENMT: oropharynx clear without erythema, moist mucous membranes, good dentition  LUNGS: reduced air entry b/l, no wheezing  HEART: S1/S2, regular rate and rhythm, distant heart sounds  GASTROINTESTINAL: abdomen is soft, thin, nontender, active bowel sounds  MUSCULOSKELETAL: no clubbing or cyanosis, no obvious deformity  INTEGUMENT: there is a large skin defect on medial aspet of L distal shin, there is surrounding erythema, the dressings were removed with sticky thick drainage on the dressing, no odor noted, the surrounding skin around the wound is warm  NEUROLOGIC: awake, alert, readily interactive, good muscle tone in 4 extremities, significantly [place]    T(C): 36.8 (10-24-23 @ 08:08), Max: 36.8 (10-24-23 @ 08:08)  HR: 83 (10-24-23 @ 08:08) (83 - 95)  BP: 132/68 (10-24-23 @ 08:08) (120/63 - 158/54)  RR: 18 (10-24-23 @ 08:08) (18 - 18)  SpO2: 96% (10-24-23 @ 08:08) (95% - 98%)  Wt(kg): --    ----  INTAKE & OUTPUT:  I&O's Summary      LABS:                        12.0   12.07 )-----------( 288      ( 24 Oct 2023 05:16 )             37.2     10-24    143  |  110<H>  |  18  ----------------------------<  133<H>  4.5   |  29  |  1.00    Ca    8.6      24 Oct 2023 05:16    TPro  5.8<L>  /  Alb  2.7<L>  /  TBili  0.7  /  DBili  x   /  AST  26  /  ALT  20  /  AlkPhos  76  10-24    PT/INR - ( 23 Oct 2023 21:50 )   PT: 13.5 sec;   INR: 1.16 ratio      inflammatory markers elevated       PTT - ( 23 Oct 2023 21:50 )  PTT:31.4 sec  Urinalysis Basic - ( 24 Oct 2023 05:16 )    Color: x / Appearance: x / SG: x / pH: x  Gluc: 133 mg/dL / Ketone: x  / Bili: x / Urobili: x   Blood: x / Protein: x / Nitrite: x   Leuk Esterase: x / RBC: x / WBC x   Sq Epi: x / Non Sq Epi: x / Bacteria: x      CAPILLARY BLOOD GLUCOSE              ----  DATA REVIEW:  Personally reviewed:  Vital sign trends: [ x ] yes    [  ] no     [  ] n/a - bp elevated  Laboratory results: [ x ] yes    [  ] no     [  ] n/a - monitor wbc - [id number] today's labs and ordered AM labs to monitor for drug toxicity and to monitor response to interventions  Radiology results: [ x ] yes    [  ] no     [  ] n/a - reviewed tib-fib LLE x-ray no miko defect noted on my read  Microbio results: [  ] yes    [  ] no     [x  ] n/a - wound culture is in the lab pending  Consultant recommendations: [  ] yes    [  ] no     [ x ] n/a - reviewed abx changes with ID    ----  ACTIVE MEDICATIONS:  - ID - cefepime   IVPB 2000 milliGRAM(s) IV Intermittent every 12 hours  - ID - vancomycin  IVPB 1000 milliGRAM(s) IV Intermittent every 12 hours  - MSK - acetaminophen     Tablet .. 650 milliGRAM(s) Oral every 6 hours PRN  - VTE PPX - enoxaparin Injectable 40 milliGRAM(s) SubCutaneous every 24 hours

## 2023-10-24 NOTE — ED ADULT NURSE NOTE - CHIEF COMPLAINT QUOTE
Patient BIB son with c/o left leg wound check. Per son report patient has been seen at [address] for leg ulcers, son reports he was out of town and came on this weekend and patient seemed to have weeping from wound to the point where he is slipping on the floor there is so much drainage. Patient denies complaints.

## 2023-10-24 NOTE — CONSULT NOTE ADULT - ASSESSMENT
Left posterior ankle wound down to skin, subcutaneous tissue, fat, tendon, bone  Left foot submet head 1 and left plantar hallux wound down to skin, subcutaneous tissue, fat

## 2023-10-24 NOTE — PATIENT PROFILE ADULT - FALL HARM RISK - RISK INTERVENTIONS

## 2023-10-24 NOTE — ED ADULT NURSE NOTE - OBJECTIVE STATEMENT
Pt presented to ED for a wound check.  Pt informed by [facility] to come to [place] ED for wound care.  Pt noted to have a large deep open wound to back of left achilles area.  Pt denies any pain or discomfort to area.  No chest pain or SOB.  No n/v/d.  Afebrile.  Maintain comfort and safety.

## 2023-10-24 NOTE — CONSULT NOTE ADULT - ASSESSMENT
89-year-old male with PMHx of SCC, who presented with left lower extremity wound. Concern for infected LLE ulcer, and given appearance and depth of wound concern for deeper underlying infection. Xray showed no subcutaneous emphysema or gross osteomyelitis but MRI pending for further evaluation. WBC slightly increased to 12 but no fever. CRP is 41.    -continue cefepime 2g IV q12h (for CrCl 41)  -decrease vancomycin to 1g IV q24h  -vancomycin AUC based dosing per pharmacy  -follow blood and wound cultures  -Podiatry evaluation  -wound care  -monitor WBC    Thank you for courtesy of this consult.     Will follow.  Discussed with Dr. Amanuel Azar MD  Division of Infectious Diseases   Cell [phone] between 8am and 6pm   After 6pm and weekends please call ID service at [phone].     55 minutes spent on total encounter assessing patient, examination, chart review, counseling and coordinating care by the attending physician/nurse/care manager.

## 2023-10-24 NOTE — ED ADULT NURSE NOTE - CHPI ED NUR SYMPTOMS POS
REVIEW OF SYSTEMS:  Constitutional: fatigue and generalized weakness  Eye Problem(s):glasses or contacts  ENT Problem(s):headaches  Cardiovascular problem(s):lower extremity edema and shortness of breath with exertion  Respiratory problem(s):shortness of breath  Gastro-intestinal problem(s):heartburn or reflux and nausea  Genito-urinary problem(s):incontinence  Musculoskeletal problem(s):back pain and arthritis and muscle and joint pain  Integumentary problem(s):bruising and rash  Neurological problem(s):unsteadiness and feeling like passing out  Psychiatric problem(s):depression and sleep disturbance  Endocrine problem(s):cold intolerance and heat intolerance  Hematologic and/or Lymphatic problem(s):easy bruising and allergies    Patient does not take aspirin.  Patient does not smoke     BLEEDING AT SITE

## 2023-10-25 DIAGNOSIS — I48.91 UNSPECIFIED ATRIAL FIBRILLATION: ICD-10-CM

## 2023-10-25 DIAGNOSIS — R41.0 DISORIENTATION, UNSPECIFIED: ICD-10-CM

## 2023-10-25 LAB
ALBUMIN SERPL ELPH-MCNC: 2.5 G/DL — LOW (ref 3.3–5)
ALBUMIN SERPL ELPH-MCNC: 2.5 G/DL — LOW (ref 3.3–5)
ALP SERPL-CCNC: 72 U/L — SIGNIFICANT CHANGE UP (ref 40–120)
ALP SERPL-CCNC: 72 U/L — SIGNIFICANT CHANGE UP (ref 40–120)
ALT FLD-CCNC: 23 U/L — SIGNIFICANT CHANGE UP (ref 12–78)
ALT FLD-CCNC: 23 U/L — SIGNIFICANT CHANGE UP (ref 12–78)
ANION GAP SERPL CALC-SCNC: 7 MMOL/L — SIGNIFICANT CHANGE UP (ref 5–17)
ANION GAP SERPL CALC-SCNC: 7 MMOL/L — SIGNIFICANT CHANGE UP (ref 5–17)
AST SERPL-CCNC: 31 U/L — SIGNIFICANT CHANGE UP (ref 15–37)
AST SERPL-CCNC: 31 U/L — SIGNIFICANT CHANGE UP (ref 15–37)
BASOPHILS # BLD AUTO: 0.06 K/UL — SIGNIFICANT CHANGE UP (ref 0–0.2)
BASOPHILS # BLD AUTO: 0.06 K/UL — SIGNIFICANT CHANGE UP (ref 0–0.2)
BASOPHILS NFR BLD AUTO: 0.6 % — SIGNIFICANT CHANGE UP (ref 0–2)
BASOPHILS NFR BLD AUTO: 0.6 % — SIGNIFICANT CHANGE UP (ref 0–2)
BILIRUB SERPL-MCNC: 0.8 MG/DL — SIGNIFICANT CHANGE UP (ref 0.2–1.2)
BILIRUB SERPL-MCNC: 0.8 MG/DL — SIGNIFICANT CHANGE UP (ref 0.2–1.2)
BUN SERPL-MCNC: 16 MG/DL — SIGNIFICANT CHANGE UP (ref 7–23)
BUN SERPL-MCNC: 16 MG/DL — SIGNIFICANT CHANGE UP (ref 7–23)
CALCIUM SERPL-MCNC: 8.4 MG/DL — LOW (ref 8.5–10.1)
CALCIUM SERPL-MCNC: 8.4 MG/DL — LOW (ref 8.5–10.1)
CHLORIDE SERPL-SCNC: 106 MMOL/L — SIGNIFICANT CHANGE UP (ref 96–108)
CHLORIDE SERPL-SCNC: 106 MMOL/L — SIGNIFICANT CHANGE UP (ref 96–108)
CO2 SERPL-SCNC: 25 MMOL/L — SIGNIFICANT CHANGE UP (ref 22–31)
CO2 SERPL-SCNC: 25 MMOL/L — SIGNIFICANT CHANGE UP (ref 22–31)
CREAT SERPL-MCNC: 1 MG/DL — SIGNIFICANT CHANGE UP (ref 0.5–1.3)
CREAT SERPL-MCNC: 1 MG/DL — SIGNIFICANT CHANGE UP (ref 0.5–1.3)
EGFR: 68 ML/MIN/1.73M2 — SIGNIFICANT CHANGE UP
EGFR: 68 ML/MIN/1.73M2 — SIGNIFICANT CHANGE UP
EOSINOPHIL # BLD AUTO: 0.07 K/UL — SIGNIFICANT CHANGE UP (ref 0–0.5)
EOSINOPHIL # BLD AUTO: 0.07 K/UL — SIGNIFICANT CHANGE UP (ref 0–0.5)
EOSINOPHIL NFR BLD AUTO: 0.7 % — SIGNIFICANT CHANGE UP (ref 0–6)
EOSINOPHIL NFR BLD AUTO: 0.7 % — SIGNIFICANT CHANGE UP (ref 0–6)
GLUCOSE SERPL-MCNC: 140 MG/DL — HIGH (ref 70–99)
GLUCOSE SERPL-MCNC: 140 MG/DL — HIGH (ref 70–99)
HCT VFR BLD CALC: 36.1 % — LOW (ref 39–50)
HCT VFR BLD CALC: 36.1 % — LOW (ref 39–50)
HGB BLD-MCNC: 11.6 G/DL — LOW (ref 13–17)
HGB BLD-MCNC: 11.6 G/DL — LOW (ref 13–17)
IMM GRANULOCYTES NFR BLD AUTO: 0.5 % — SIGNIFICANT CHANGE UP (ref 0–0.9)
IMM GRANULOCYTES NFR BLD AUTO: 0.5 % — SIGNIFICANT CHANGE UP (ref 0–0.9)
LYMPHOCYTES # BLD AUTO: 1.4 K/UL — SIGNIFICANT CHANGE UP (ref 1–3.3)
LYMPHOCYTES # BLD AUTO: 1.4 K/UL — SIGNIFICANT CHANGE UP (ref 1–3.3)
LYMPHOCYTES # BLD AUTO: 14.5 % — SIGNIFICANT CHANGE UP (ref 13–44)
LYMPHOCYTES # BLD AUTO: 14.5 % — SIGNIFICANT CHANGE UP (ref 13–44)
MAGNESIUM SERPL-MCNC: 1.9 MG/DL — SIGNIFICANT CHANGE UP (ref 1.6–2.6)
MAGNESIUM SERPL-MCNC: 1.9 MG/DL — SIGNIFICANT CHANGE UP (ref 1.6–2.6)
MCHC RBC-ENTMCNC: 27.5 PG — SIGNIFICANT CHANGE UP (ref 27–34)
MCHC RBC-ENTMCNC: 27.5 PG — SIGNIFICANT CHANGE UP (ref 27–34)
MCHC RBC-ENTMCNC: 32.1 GM/DL — SIGNIFICANT CHANGE UP (ref 32–36)
MCHC RBC-ENTMCNC: 32.1 GM/DL — SIGNIFICANT CHANGE UP (ref 32–36)
MCV RBC AUTO: 85.5 FL — SIGNIFICANT CHANGE UP (ref 80–100)
MCV RBC AUTO: 85.5 FL — SIGNIFICANT CHANGE UP (ref 80–100)
MONOCYTES # BLD AUTO: 0.97 K/UL — HIGH (ref 0–0.9)
MONOCYTES # BLD AUTO: 0.97 K/UL — HIGH (ref 0–0.9)
MONOCYTES NFR BLD AUTO: 10.1 % — SIGNIFICANT CHANGE UP (ref 2–14)
MONOCYTES NFR BLD AUTO: 10.1 % — SIGNIFICANT CHANGE UP (ref 2–14)
NEUTROPHILS # BLD AUTO: 7.09 K/UL — SIGNIFICANT CHANGE UP (ref 1.8–7.4)
NEUTROPHILS # BLD AUTO: 7.09 K/UL — SIGNIFICANT CHANGE UP (ref 1.8–7.4)
NEUTROPHILS NFR BLD AUTO: 73.6 % — SIGNIFICANT CHANGE UP (ref 43–77)
NEUTROPHILS NFR BLD AUTO: 73.6 % — SIGNIFICANT CHANGE UP (ref 43–77)
NRBC # BLD: 0 /100 WBCS — SIGNIFICANT CHANGE UP (ref 0–0)
NRBC # BLD: 0 /100 WBCS — SIGNIFICANT CHANGE UP (ref 0–0)
PLATELET # BLD AUTO: 294 K/UL — SIGNIFICANT CHANGE UP (ref 150–400)
PLATELET # BLD AUTO: 294 K/UL — SIGNIFICANT CHANGE UP (ref 150–400)
POTASSIUM SERPL-MCNC: 3.8 MMOL/L — SIGNIFICANT CHANGE UP (ref 3.5–5.3)
POTASSIUM SERPL-MCNC: 3.8 MMOL/L — SIGNIFICANT CHANGE UP (ref 3.5–5.3)
POTASSIUM SERPL-SCNC: 3.8 MMOL/L — SIGNIFICANT CHANGE UP (ref 3.5–5.3)
POTASSIUM SERPL-SCNC: 3.8 MMOL/L — SIGNIFICANT CHANGE UP (ref 3.5–5.3)
PROCALCITONIN SERPL-MCNC: 0.05 NG/ML — SIGNIFICANT CHANGE UP
PROCALCITONIN SERPL-MCNC: 0.05 NG/ML — SIGNIFICANT CHANGE UP
PROT SERPL-MCNC: 5.9 G/DL — LOW (ref 6–8.3)
PROT SERPL-MCNC: 5.9 G/DL — LOW (ref 6–8.3)
RBC # BLD: 4.22 M/UL — SIGNIFICANT CHANGE UP (ref 4.2–5.8)
RBC # BLD: 4.22 M/UL — SIGNIFICANT CHANGE UP (ref 4.2–5.8)
RBC # FLD: 13.6 % — SIGNIFICANT CHANGE UP (ref 10.3–14.5)
RBC # FLD: 13.6 % — SIGNIFICANT CHANGE UP (ref 10.3–14.5)
SODIUM SERPL-SCNC: 138 MMOL/L — SIGNIFICANT CHANGE UP (ref 135–145)
SODIUM SERPL-SCNC: 138 MMOL/L — SIGNIFICANT CHANGE UP (ref 135–145)
VANCOMYCIN TROUGH SERPL-MCNC: 7.4 UG/ML — LOW (ref 10–20)
VANCOMYCIN TROUGH SERPL-MCNC: 7.4 UG/ML — LOW (ref 10–20)
WBC # BLD: 9.64 K/UL — SIGNIFICANT CHANGE UP (ref 3.8–10.5)
WBC # BLD: 9.64 K/UL — SIGNIFICANT CHANGE UP (ref 3.8–10.5)
WBC # FLD AUTO: 9.64 K/UL — SIGNIFICANT CHANGE UP (ref 3.8–10.5)
WBC # FLD AUTO: 9.64 K/UL — SIGNIFICANT CHANGE UP (ref 3.8–10.5)

## 2023-10-25 PROCEDURE — 99223 1ST HOSP IP/OBS HIGH 75: CPT

## 2023-10-25 PROCEDURE — 99232 SBSQ HOSP IP/OBS MODERATE 35: CPT

## 2023-10-25 PROCEDURE — 93010 ELECTROCARDIOGRAM REPORT: CPT | Mod: 76

## 2023-10-25 PROCEDURE — 99233 SBSQ HOSP IP/OBS HIGH 50: CPT

## 2023-10-25 RX ORDER — OLANZAPINE 15 MG/1
2.5 TABLET, FILM COATED ORAL ONCE
Refills: 0 | Status: COMPLETED | OUTPATIENT
Start: 2023-10-25 | End: 2023-10-25

## 2023-10-25 RX ORDER — ENOXAPARIN SODIUM 100 MG/ML
40 INJECTION SUBCUTANEOUS EVERY 24 HOURS
Refills: 0 | Status: COMPLETED | OUTPATIENT
Start: 2023-10-26 | End: 2023-10-30

## 2023-10-25 RX ORDER — OLANZAPINE 15 MG/1
2.5 TABLET, FILM COATED ORAL EVERY 6 HOURS
Refills: 0 | Status: DISCONTINUED | OUTPATIENT
Start: 2023-10-25 | End: 2023-10-31

## 2023-10-25 RX ORDER — ENOXAPARIN SODIUM 100 MG/ML
70 INJECTION SUBCUTANEOUS EVERY 12 HOURS
Refills: 0 | Status: DISCONTINUED | OUTPATIENT
Start: 2023-10-25 | End: 2023-10-25

## 2023-10-25 RX ADMIN — Medication 250 MILLIGRAM(S): at 22:30

## 2023-10-25 RX ADMIN — OLANZAPINE 2.5 MILLIGRAM(S): 15 TABLET, FILM COATED ORAL at 10:51

## 2023-10-25 RX ADMIN — ENOXAPARIN SODIUM 40 MILLIGRAM(S): 100 INJECTION SUBCUTANEOUS at 06:06

## 2023-10-25 RX ADMIN — CEFEPIME 100 MILLIGRAM(S): 1 INJECTION, POWDER, FOR SOLUTION INTRAMUSCULAR; INTRAVENOUS at 06:06

## 2023-10-25 RX ADMIN — OLANZAPINE 2.5 MILLIGRAM(S): 15 TABLET, FILM COATED ORAL at 18:11

## 2023-10-25 RX ADMIN — Medication 1 MILLIGRAM(S): at 15:03

## 2023-10-25 RX ADMIN — CEFEPIME 100 MILLIGRAM(S): 1 INJECTION, POWDER, FOR SOLUTION INTRAMUSCULAR; INTRAVENOUS at 18:11

## 2023-10-25 RX ADMIN — TAMSULOSIN HYDROCHLORIDE 0.4 MILLIGRAM(S): 0.4 CAPSULE ORAL at 22:30

## 2023-10-25 NOTE — DIETITIAN INITIAL EVALUATION ADULT - REASON
pt asleep in side, unable to visualize applicable body areas for exam, staff request not to rouse pt

## 2023-10-25 NOTE — DIETITIAN INITIAL EVALUATION ADULT - PERTINENT LABORATORY DATA
10-25    138  |  106  |  16  ----------------------------<  140<H>  3.8   |  25  |  1.00    Ca    8.4<L>      25 Oct 2023 06:48  Mg     1.9     10-25    TPro  5.9<L>  /  Alb  2.5<L>  /  TBili  0.8  /  DBili  x   /  AST  31  /  ALT  23  /  AlkPhos  72  10-25  A1C with Estimated Average Glucose Result: 6.3 % (10-24-23 @ 05:16)

## 2023-10-25 NOTE — DIETITIAN INITIAL EVALUATION ADULT - ENERGY INTAKE
Per staff, pt preferred to eat a muffin brought by family this morning vs bfst provided.  Did not appear to have any difficulty chewing/swallowing. Fair (50-75%)

## 2023-10-25 NOTE — PROGRESS NOTE ADULT - PROBLEM SELECTOR PLAN 5
intermittently takes meloxicam at home  - will use tylenol for pain for now hgba1c is 6.3  - monitor  - counseling  - no intervention warranted given age normalized  nonhealing wound for at least 3 months  check arterial doppler to eval for PAD  spoke w/ podiatry - poor overall surgical candidate but further plans pending clinical course

## 2023-10-25 NOTE — PROGRESS NOTE ADULT - PROBLEM SELECTOR PLAN 2
takes tamsulosin but family reports nonadherence  - resume 0.4mg qhs pt agitated, not directable, given IM zyprexa for safety  - spoke w/ family  - palliative care also assessed - MOLST form updated - DNR and DNI elevated chadsvasc score  - the pt is poor candidate for long term AC  - until further definitive plans are made, will initiate therapeutic AC with lovenox  - will update the family on risks:benefits and discuss GOC further  - rates controlled  - monitor h/h  - consulted cardio elevated chadsvasc score  - the pt is poor candidate for long term AC - spoke w/ pt's son (HCP) and he agrees that therapeutic AC risk>benefit. will plan for pharmacologic VTE ppx while admitted then will not continue AC at time of dc  - pt does not wish to take any meds - he never took his BP meds historically due to "fatigue" and he will unlikely take any norbert blockers even if recommended - family agrees  - rates controlled at present  - monitor h/h  - spoke w/ cardio about above - conservative tx plan

## 2023-10-25 NOTE — PROGRESS NOTE ADULT - SUBJECTIVE AND OBJECTIVE BOX
[facility]  INFECTIOUS DISEASES - Raphael Georges, [address], [place] [address]  Tel: [phone]     Fax: [phone]  =======================================================    ALICIA HERNANDEZ [id number]    Follow up: No fevers. Patient poor historian, but denies any pain or SOB.    Allergies:  No Known Allergies      Antibiotics:  acetaminophen     Tablet .. 650 milliGRAM(s) Oral every 6 hours PRN  cefepime   IVPB 2000 milliGRAM(s) IV Intermittent every 12 hours  enoxaparin Injectable 70 milliGRAM(s) SubCutaneous every 12 hours  LORazepam   Injectable 1 milliGRAM(s) IV Push once PRN  tamsulosin 0.4 milliGRAM(s) Oral at bedtime  vancomycin  IVPB 1000 milliGRAM(s) IV Intermittent every 24 hours       REVIEW OF SYSTEMS:  Limited as poor historian and hard of hearing, as per HPI     Physical Exam:  ICU Vital Signs Last 24 Hrs  T(C): 36.5 (25 Oct 2023 11:33), Max: 36.5 (25 Oct 2023 11:33)  T(F): 97.7 (25 Oct 2023 11:33), Max: 97.7 (25 Oct 2023 11:33)  HR: 78 (25 Oct 2023 11:33) (72 - 97)  BP: 143/80 (25 Oct 2023 11:33) (137/64 - 157/83)  BP(mean): --  ABP: --  ABP(mean): --  RR: 19 (25 Oct 2023 11:33) (17 - 20)  SpO2: 92% (25 Oct 2023 11:33) (92% - 98%)    O2 Parameters below as of 25 Oct 2023 11:33  Patient On (Oxygen Delivery Method): room air        GEN: NAD  HEENT: normocephalic and atraumatic.   NECK: Supple.   LUNGS: Normal respiratory effort  HEART: Regular rate and rhythm   ABDOMEN: Soft, nontender, and nondistended.    EXTREMITIES: No leg edema.  NEUROLOGIC: Answering some simple questions  SKIN: (+) large ulceration on posterior L lower calf with necrotic tissue      Labs:  10-25    138  |  106  |  16  ----------------------------<  140<H>  3.8   |  25  |  1.00    Ca    8.4<L>      25 Oct 2023 06:48  Mg     1.9     10-25    TPro  5.9<L>  /  Alb  2.5<L>  /  TBili  0.8  /  DBili  x   /  AST  31  /  ALT  23  /  AlkPhos  72  10-25                          11.6   9.64  )-----------( 294      ( 25 Oct 2023 06:48 )             36.1     PT/INR - ( 23 Oct 2023 21:50 )   PT: 13.5 sec;   INR: 1.16 ratio         PTT - ( 23 Oct 2023 21:50 )  PTT:31.4 sec  Urinalysis Basic - ( 25 Oct 2023 06:48 )    Color: x / Appearance: x / SG: x / pH: x  Gluc: 140 mg/dL / Ketone: x  / Bili: x / Urobili: x   Blood: x / Protein: x / Nitrite: x   Leuk Esterase: x / RBC: x / WBC x   Sq Epi: x / Non Sq Epi: x / Bacteria: x      LIVER FUNCTIONS - ( 25 Oct 2023 06:48 )  Alb: 2.5 g/dL / Pro: 5.9 g/dL / ALK PHOS: 72 U/L / ALT: 23 U/L / AST: 31 U/L / GGT: x             RECENT CULTURES:  10-23 @ 21:50 .Blood Blood-Peripheral     No growth at 24 hours              All imaging and data are reviewed.

## 2023-10-25 NOTE — PROGRESS NOTE ADULT - TIME BILLING
spoke w/ ID  spoke w/ family who will visit later today  educated on MRI plan, abx, lab results including a1c and wbc  ordered arterial dopp - may need vasc  monitor for toxicity of abx  further recs pending course GOC  spoke w/ ID and podiatry  spoke w/ family multiple times  spoke w/ palliative care team  spoke w/ MRI tech  reviewed with IDT GOC  new onset af - consulted and spoke w/ cardio  spoke w/ ID and podiatry  spoke w/ family multiple times  spoke w/ palliative care team  spoke w/ MRI tech  reviewed with IDT

## 2023-10-25 NOTE — PROGRESS NOTE ADULT - PROBLEM SELECTOR PLAN 1
LLE cellulitis, significant drainage, circumferentially affected the ankle  - change abx to cefepime (from zosyn), cont vanco - monitor closely for toxicity, monitor lytes and renal indices closely  - elevated inflammatory markers  - MR L foot is ordered  - f/u wound cx - collected and pending in the lab  - f/u BCX x2  - podiatry and ID to assess LLE cellulitis, significant drainage, circumferentially affected the ankle  - spoke w/ ID - cont vancomycin and cefepime - monitor closely for toxicity, monitor lytes and renal indices closely  - elevated inflammatory markers noted  - MR L foot is ordered  - f/u wound cx - collected and pending in the lab  - f/u BCx x2  - podiatry and ID to assess

## 2023-10-25 NOTE — GOALS OF CARE CONVERSATION - ADVANCED CARE PLANNING - CONVERSATION DETAILS
Palliative care team spoke with patient's son Hilario by phone. Reviewed patient's medical and social history as well as events leading to patient's hospitalization. Writer discussed patient's current diagnosis (leg wound, left; dementia; HX SCC), medical condition and management. Son states that he is patient's HCP. Inquired about patient's wishes regarding extent of medical care to be provided including escalation of medical care into the ICU and use of vasopressor support. In addition, the writer inquired about thoughts regarding cardiopulmonary resuscitation, artificial nutrition and hydration including use of feeding tubes and IVF, antibiotics, and further investigative studies such as blood draws and radiology. Son showed insight into medical condition. He states that he would not want resuscitation or intubation for patient. Team recommended completion of MOLST. MOLST form completed with DNR/DNI orders and placed on patient's chart. All questions answered. Psychosocial support provided.

## 2023-10-25 NOTE — CASE MANAGEMENT PROGRESS NOTE - NSCMPROGRESSNOTE_GEN_ALL_CORE
The pt is from home alone and ambulates with a cane, son involved and arranging for [facility] [facility] via [facility], requesting referral to [facility] if indicated upon DC. Pt with L posterior ankle wound being follow by podiatry. Pt for MRI to r/o osteo. Wound is down to the tendon and bone. Will need to go to the OR for a debridement. Please follow for dispo plan. WIll need PTE post surgery to determine dispo. Possible ANGIE pending help and WB status.

## 2023-10-25 NOTE — PROGRESS NOTE ADULT - ASSESSMENT
89-year-old male with PMHx of SCC presents to the emergency department with son with report of left lower extremity infection.  Admitted for LLE wound; r/o OM.       89-year-old male with PMHx of SCC, HTN , spinal stenosis presents to the emergency department with son with report of left lower extremity infection concern for OM now with new onset Afib     Atrial Fibrillation  - p/w Left lower extremity infection, w/u ongoing concerning for OM    - EKG: Afib 96 with PACs   - New onset, unknown duration, per son (bedside), they were told about skipped beats, not Afib,  denies taking AC and rate controlling agent    - [id number] score: 3 (age, hx htn)   - not given advanced age, hx of delirium, he is not   - Cardiac enzymes q8h x2 to r/o ischemia.  - 2 D echo to evaluate function and to r/o thrombus  - Check thyroid panel   - Monitor lytes and replete as needed.    - Given relative hypotension, would favor Digoxin for rate control. AFL is typically difficult to rate control, and will likely require DCCV if she does not spontaneously convert.  - Would benefit from AC, but given alcohol abuse and poor follow up, the risk explained.  The patient noted that he will follow up as outpatient.  WIll therefore give him the benefit of the doubt, and start AC.   - Despite his CHADVaSc = 2+ no AC due to fall risk, noncompliance and ETOH abuse    - Choose norpace for rhythm control with LVOT. Can use amiodarone afterwards for more rhythm control         89-year-old male with PMHx of SCC, HTN , spinal stenosis presents to the emergency department with son with report of left lower extremity infection concern for OM now with new onset Afib     Atrial Fibrillation, cardiac optimization   - p/w Left lower extremity infection, w/u ongoing concerning for OM, podiatry following     - EKG: Afib 96 with PACs   - New onset, unknown duration, per son (bedside), they were told about skipped beats, not Afib denies taking AC and rate controlling agent  - no sign of acute ischemia  - no anginal complaints on exam    - [id number] score: 3 (age, hx htn)   - on low dose Lovenox   - given advanced age, ? compliance with meds, hx of delirium, on enhanced observation for safety, he is poor candidate for long term AC, risks outweighs benefits  - no sign of volume overload, non orthopneic, no O2 requirement   - can obtain 2 D echo to evaluate function and to r/o thrombus     - BP, HR stable and controlled   - per son, he was on losartan however patient stopped taking 2/2 to feeling tired after taking two doses   - continue to monitor routine hemodynamics   - Monitor and replete Lytes. Keep K > 4 and Mg > 2    - Will continue to follow.    Keri Cox [facility]  Nurse Practitioner - Cardiology   call TEAMS     89-year-old male with PMHx of SCC, HTN , spinal stenosis presents to the emergency department with son with report of left lower extremity infection concern for OM now with concern for afib on his EKG.     Abnormal EKG, possible Afib,, cardiac optimization   - p/w Left lower extremity infection, w/u ongoing concerning for OM, podiatry following     - EKG: irregular rhythm, possible Afib vs SR with mobitz type I and PACs  - New onset, unknown duration, per son (bedside), they were told about skipped beats, not Afib denies taking AC and rate controlling agent  - no sign of acute ischemia  - no anginal complaints on exam    - [id number] score: 3 (age, hx htn)   - on full dose Lovenox   - given advanced age, ? compliance with meds, hx of delirium, on enhanced observation for safety, he is poor candidate for long term AC, risks outweighs benefits  - no sign of volume overload, non orthopneic, no O2 requirement   - can obtain 2 D echo to evaluate function and to r/o thrombus     - BP, HR stable and controlled   - per son, he was on losartan however patient stopped taking 2/2 to feeling tired after taking two doses   - continue to monitor routine hemodynamics   - Monitor and replete Lytes. Keep K > 4 and Mg > 2    - Will continue to follow.    Keri Cox [facility]  Nurse Practitioner - Cardiology   call TEAMS

## 2023-10-25 NOTE — DIETITIAN INITIAL EVALUATION ADULT - NSFNSPHYEXAMSKINFT_GEN_A_CORE
Pressure Injury 1: Left:, heel, Stage I  Pressure Injury 2: Right:, heel, Stage I  Pressure Injury 3: none, none  Pressure Injury 4: Left:, first toe, Unstageable, Non-stageable mucosal injury  Pressure Injury 5: plantar, Left:, foot, Unstageable  Pressure Injury 6: buttocks, Right:, Stage II  Pressure Injury 7: Bilateral:, buttocks, on left buttocks and surrounding stage 2 on right buttocks, Stage I

## 2023-10-25 NOTE — PROGRESS NOTE ADULT - PROBLEM SELECTOR PLAN 3
uptrending WBC  nonhealing wound for at least 3 months  check arterial doppler to eval for PAD takes tamsulosin but family reports nonadherence  - resume 0.4mg qhs pt agitated, not directable, given IM zyprexa for safety  - spoke w/ family  - palliative care also assessed - MOLST form updated - DNR and DNI

## 2023-10-25 NOTE — PROGRESS NOTE ADULT - PROBLEM SELECTOR PLAN 6
Lovenox 40mg qd intermittently takes meloxicam at home  - will use tylenol for pain for now hgba1c is 6.3  - monitor  - counseling  - no intervention warranted given age

## 2023-10-25 NOTE — CAREGIVER ENGAGEMENT NOTE - CAREGIVER OUTREACH NOTES - FREE TEXT
CALVIN met with pt's son [phone] who states pt lives alone, was managing well with ADLS prior to admission, son is planning on pvt hire HHA via [facility] and is interested in home care referral to [facility] if indicated.

## 2023-10-25 NOTE — PROGRESS NOTE ADULT - ASSESSMENT
89-year-old male with PMHx of SCC, who presented with left lower extremity wound. Concern for infected LLE ulcer, and given appearance and depth of wound concern for deeper underlying infection. Xray showed no subcutaneous emphysema or gross osteomyelitis but MRI pending for further evaluation.     Remains afebrile and leukocytosis resolved. Baseline CRP is 41. Blood cultures currently no growth.    #LLE infected ulcer  #Leukocytosis    -continue cefepime 2g IV q12h (for CrCl 41)  -continue vancomycin 1g IV q24h pending wound culture  -vancomycin AUC based dosing per pharmacy  -follow cultures to completion  -wound care  -discussed with Dr. Amanuel Azar MD  Division of Infectious Diseases   Cell [phone] between 8am and 6pm   After 6pm and weekends please call ID service at [phone].

## 2023-10-25 NOTE — PROGRESS NOTE ADULT - SUBJECTIVE AND OBJECTIVE BOX
Name: ALICIA HERNANDEZ  MRN: [id number]  LOCATION: [place] [address]    ----  Patient is a 89y old  Male who presents with a chief complaint of Cellulitis of left lower extremity    89-year-old male with PMHx of SCC presents to the emergency department with son with report of left lower extremity infection.  Admitted for LLE wound; r/o OM.   (25 Oct 2023 09:58)      FROM ADMISSION H+P:   HPI:  89-year-old male with PMHx of SCC presents to the emergency department with son with report of left lower extremity infection     ----  INTERVAL HPI/OVERNIGHT EVENTS: Pt seen and evaluated at the bedside. Pt was trying to climb out of bed overnight to urinate. Spoke w/ staff and we agreed that enhanced supervision appropriate due to poorly directable nature of his confusion. He states "I just got off a flight, why you are locking me in here?" When I attempted to direct the patient, he grabbed my arms and my coat to push me out of the way so he could stand up to leave the room. Despite attempts to redirect him, he was not receptive to my counseling. I attempted to show him my badge, show him the nursing station, made eye contact, I was gently reassuring to deescalate the situation but the pt was not receptive to my counseling. We were able to have the pt over the phone speak with him but this was only temporarily effective. Pt is not able to provide reliable history at the present time.     ----  PAST MEDICAL & SURGICAL HISTORY:  No pertinent past medical history      H/O hernia repair          FAMILY HISTORY:  No pertinent family history in first degree relatives        Allergies    No Known Allergies    Intolerances        ----  REVIEW OF SYSTEMS:  pt aggressive with this writer, unable to obtain reliable information from him at this time    ----  PHYSICAL EXAM:  GENERAL: patient appears advanced age, thin, confused  ENMT: oropharynx clear without erythema, moist mucous membranes, good dentition  LUNGS: reduced air entry b/l, no wheezing  HEART: S1/S2, regular rate and rhythm, distant heart sounds  GASTROINTESTINAL: abdomen is soft, thin, nontender, active bowel sounds  MUSCULOSKELETAL: no clubbing or cyanosis, no obvious deformity  INTEGUMENT: there is a large skin defect on medial aspect of L distal shin, dressing soaked through, no odor noted during my assessment  NEUROLOGIC: awake, alert, confused, not following commands, oriented only to person    T(C): 36.5 (10-25-23 @ 11:33), Max: 36.5 (10-25-23 @ 11:33)  HR: 78 (10-25-23 @ 11:33) (72 - 97)  BP: 143/80 (10-25-23 @ 11:33) (137/64 - 157/83)  RR: 19 (10-25-23 @ 11:33) (17 - 20)  SpO2: 92% (10-25-23 @ 11:33) (92% - 98%)  Wt(kg): --    ----  INTAKE & OUTPUT:  I&O's Summary    24 Oct 2023 07:01  -  25 Oct 2023 07:00  --------------------------------------------------------  IN: 300 mL / OUT: 150 mL / NET: 150 mL        LABS:                        11.6   9.64  )-----------( 294      ( 25 Oct 2023 06:48 )             36.1     10-25    138  |  106  |  16  ----------------------------<  140<H>  3.8   |  25  |  1.00    Ca    8.4<L>      25 Oct 2023 06:48  Mg     1.9     10-25    TPro  5.9<L>  /  Alb  2.5<L>  /  TBili  0.8  /  DBili  x   /  AST  31  /  ALT  23  /  AlkPhos  72  10-25    PT/INR - ( 23 Oct 2023 21:50 )   PT: 13.5 sec;   INR: 1.16 ratio         PTT - ( 23 Oct 2023 21:50 )  PTT:31.4 sec  Urinalysis Basic - ( 25 Oct 2023 06:48 )    Color: x / Appearance: x / SG: x / pH: x  Gluc: 140 mg/dL / Ketone: x  / Bili: x / Urobili: x   Blood: x / Protein: x / Nitrite: x   Leuk Esterase: x / RBC: x / WBC x   Sq Epi: x / Non Sq Epi: x / Bacteria: x      CAPILLARY BLOOD GLUCOSE            Culture - Blood (collected 10-23-23 @ 21:50)  Source: .Blood Blood-Peripheral  Preliminary Report (10-25-23 @ 01:02):    No growth at 24 hours    Culture - Blood (collected 10-23-23 @ 21:50)  Source: .Blood Blood-Peripheral  Preliminary Report (10-25-23 @ 01:02):    No growth at 24 hours        ----  DATA REVIEW:  Personally reviewed:  Vital sign trends: [ x ] yes    [  ] no     [  ] n/a  Laboratory results: [ x ] yes    [  ] no     [  ] n/a - wbc normalized, mild normocytic anemia (likely 2/2 AOCD), stable renal indices  Radiology results: [  ] yes    [  ] no     [ x ] n/a - will need premedication for MRI if pt is appropriate for this study toda  Microbio results: [ x ] yes    [  ] no     [  ] n/a - blood cx ngtd  Consultant recommendations: [ x ] yes    [  ] no     [  ] n/a - reviewed with podiatry today            ----  ACTIVE MEDICATIONS:         Name: ALICIA HERNANDEZ  MRN: [id number]  LOCATION: [place] [address]    ----  Patient is a 89y old  Male who presents with a chief complaint of Cellulitis of left lower extremity    89-year-old male with PMHx of SCC presents to the emergency department with son with report of left lower extremity infection.  Admitted for LLE wound; r/o OM.   (25 Oct 2023 09:58)      FROM ADMISSION H+P:   HPI:  89-year-old male with PMHx of SCC presents to the emergency department with son with report of left lower extremity infection     ----  INTERVAL HPI/OVERNIGHT EVENTS: Pt seen and evaluated at the bedside. Pt was trying to climb out of bed overnight to urinate. Spoke w/ staff and we agreed that enhanced supervision appropriate due to poorly directable nature of his confusion. He states "I just got off a flight, why you are locking me in here?" When I attempted to direct the patient, he grabbed my arms and my coat to push me out of the way so he could stand up to leave the room. Despite attempts to redirect him, he was not receptive to my counseling. I attempted to show him my badge, show him the nursing station, made eye contact, I was gently reassuring to deescalate the situation but the pt was not receptive to my counseling. We were able to have the pt's son Hilario over the phone speak with him but this was only temporarily effective. Pt is not able to provide reliable history at the present time.     ----  PAST MEDICAL & SURGICAL HISTORY:  No pertinent past medical history      H/O hernia repair          FAMILY HISTORY:  No pertinent family history in first degree relatives        Allergies    No Known Allergies    Intolerances        ----  REVIEW OF SYSTEMS:  pt aggressive with this writer, unable to obtain reliable information from him at this time    ----  PHYSICAL EXAM:  GENERAL: patient appears advanced age, thin, confused  ENMT: oropharynx clear without erythema, moist mucous membranes, good dentition  LUNGS: reduced air entry b/l, no wheezing  HEART: S1/S2, regular rate and rhythm, distant heart sounds  GASTROINTESTINAL: abdomen is soft, thin, nontender, active bowel sounds  MUSCULOSKELETAL: no clubbing or cyanosis, no obvious deformity  INTEGUMENT: there is a large skin defect on medial aspect of L distal shin, dressing soaked through, no odor noted during my assessment  NEUROLOGIC: awake, alert, confused, not following commands, oriented only to person    T(C): 36.5 (10-25-23 @ 11:33), Max: 36.5 (10-25-23 @ 11:33)  HR: 78 (10-25-23 @ 11:33) (72 - 97)  BP: 143/80 (10-25-23 @ 11:33) (137/64 - 157/83)  RR: 19 (10-25-23 @ 11:33) (17 - 20)  SpO2: 92% (10-25-23 @ 11:33) (92% - 98%)  Wt(kg): --    ----  INTAKE & OUTPUT:  I&O's Summary    24 Oct 2023 07:01  -  25 Oct 2023 07:00  --------------------------------------------------------  IN: 300 mL / OUT: 150 mL / NET: 150 mL        LABS:                        11.6   9.64  )-----------( 294      ( 25 Oct 2023 06:48 )             36.1     10-25    138  |  106  |  16  ----------------------------<  140<H>  3.8   |  25  |  1.00    Ca    8.4<L>      25 Oct 2023 06:48  Mg     1.9     10-25    TPro  5.9<L>  /  Alb  2.5<L>  /  TBili  0.8  /  DBili  x   /  AST  31  /  ALT  23  /  AlkPhos  72  10-25    PT/INR - ( 23 Oct 2023 21:50 )   PT: 13.5 sec;   INR: 1.16 ratio         PTT - ( 23 Oct 2023 21:50 )  PTT:31.4 sec  Urinalysis Basic - ( 25 Oct 2023 06:48 )    Color: x / Appearance: x / SG: x / pH: x  Gluc: 140 mg/dL / Ketone: x  / Bili: x / Urobili: x   Blood: x / Protein: x / Nitrite: x   Leuk Esterase: x / RBC: x / WBC x   Sq Epi: x / Non Sq Epi: x / Bacteria: x      CAPILLARY BLOOD GLUCOSE            Culture - Blood (collected 10-23-23 @ 21:50)  Source: .Blood Blood-Peripheral  Preliminary Report (10-25-23 @ 01:02):    No growth at 24 hours    Culture - Blood (collected 10-23-23 @ 21:50)  Source: .Blood Blood-Peripheral  Preliminary Report (10-25-23 @ 01:02):    No growth at 24 hours        ----  DATA REVIEW:  Personally reviewed:  Vital sign trends: [ x ] yes    [  ] no     [  ] n/a  Laboratory results: [ x ] yes    [  ] no     [  ] n/a - wbc normalized, mild normocytic anemia (likely 2/2 AOCD), stable renal indices  Radiology results: [  ] yes    [  ] no     [ x ] n/a - will need premedication for MRI if pt is appropriate for this study toda  Microbio results: [ x ] yes    [  ] no     [  ] n/a - blood cx ngtd  Consultant recommendations: [ x ] yes    [  ] no     [  ] n/a - reviewed with podiatry today            ----  ACTIVE MEDICATIONS (BY SYSTEM):  -  - tamsulosin 0.4 milliGRAM(s) Oral at bedtime  - ID - cefepime   IVPB 2000 milliGRAM(s) IV Intermittent every 12 hours  - ID - vancomycin  IVPB 1000 milliGRAM(s) IV Intermittent every 24 hours  - MSK - acetaminophen     Tablet .. 650 milliGRAM(s) Oral every 6 hours PRN  - VTE PPX - enoxaparin Injectable 70 milliGRAM(s) SubCutaneous every 12 hours

## 2023-10-25 NOTE — PROGRESS NOTE ADULT - SUBJECTIVE AND OBJECTIVE BOX
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  Patient states that he has had a wound in his left lower leg over the summer, some states that he took him to [facility] emergency department on 8/28/2023. Pt was given a prescription for doxycycline but never took it. Patient then returned again to the emergency department 10/4/2023 at which point given the prescription for clindamycin in which he only took 3-4 doses. Son states that there has been increased drainage from the wound for the last couple of days, brought to the ED for further evaluation. Per pt, he cleans and dresses the wound daily. Has never seen podiatry in the past.   ED course  Vitals: T 97.7, HR 95, /63, RR 18, SpO2 95% on RA  Significant labs: n/a  Imaging:   CXR wnl on personal read, mild increased vascular congestion; f/u official read   Left Foot XR : no acute fx seen; f/u official read   In ED patient given: Vanc x1, Zosyn x1   (23 Oct 2023 22:28)    EKG: AFib with PVCs     REVIEW OF SYSTEMS:   All other review of systems are negative unless indicated above    PAST MEDICAL & SURGICAL HISTORY:  No pertinent past medical history      H/O hernia repair    SOCIAL HISTORY:  No tobacco, ethanol, or drug abuse.    FAMILY HISTORY:  No pertinent family history in first degree relatives      No family history of acute MI or sudden cardiac death.    MEDICATIONS  (STANDING):  cefepime   IVPB 2000 milliGRAM(s) IV Intermittent every 12 hours  enoxaparin Injectable 70 milliGRAM(s) SubCutaneous every 12 hours  tamsulosin 0.4 milliGRAM(s) Oral at bedtime  vancomycin  IVPB 1000 milliGRAM(s) IV Intermittent every 24 hours    MEDICATIONS  (PRN):  acetaminophen     Tablet .. 650 milliGRAM(s) Oral every 6 hours PRN Temp greater or equal to 38C (100.4F), Mild Pain (1 - 3)      Allergies    No Known Allergies    Intolerances        Home meds:  Home Medications:        VITAL SIGNS:   Vital Signs Last 24 Hrs  T(C): 36.5 (25 Oct 2023 11:33), Max: 36.5 (25 Oct 2023 11:33)  T(F): 97.7 (25 Oct 2023 11:33), Max: 97.7 (25 Oct 2023 11:33)  HR: 78 (25 Oct 2023 11:33) (72 - 97)  BP: 143/80 (25 Oct 2023 11:33) (137/64 - 157/83)  BP(mean): --  RR: 19 (25 Oct 2023 11:33) (17 - 20)  SpO2: 92% (25 Oct 2023 11:33) (92% - 98%)    Parameters below as of 25 Oct 2023 11:33  Patient On (Oxygen Delivery Method): room air        I&O's Summary    24 Oct 2023 07:01  -  25 Oct 2023 07:00  --------------------------------------------------------  IN: 300 mL / OUT: 150 mL / NET: 150 mL    On Exam:  TELE: Not on telemetry   Constitutional: NAD, awake and aler  HEENT: Moist Mucous Membranes, Anicteric  Pulmonary: Non-labored, breath sounds are clear bilaterally, No wheezing, rales or rhonchi  Cardiovascular: IRRR, S1 and S2, No murmurs, rubs, gallops or clicks  Gastrointestinal: Bowel Sounds present, soft, nontender.   Lymph: No peripheral edema. No lymphadenopathy.  Skin: No visible rashes or ulcers.  Psych:  Mood & affect appropriate for situation    LABS: All Labs Reviewed:                        11.6   9.64  )-----------( 294      ( 25 Oct 2023 06:48 )             36.1                         12.0   12.07 )-----------( 288      ( 24 Oct 2023 05:16 )             37.2                         13.0   9.26  )-----------( 316      ( 23 Oct 2023 21:50 )             40.6     25 Oct 2023 06:48    138    |  106    |  16     ----------------------------<  140    3.8     |  25     |  1.00   24 Oct 2023 05:16    143    |  110    |  18     ----------------------------<  133    4.5     |  29     |  1.00   23 Oct 2023 21:50    143    |  107    |  20     ----------------------------<  138    4.5     |  31     |  1.00     Ca    8.4        25 Oct 2023 06:48  Ca    8.6        24 Oct 2023 05:16  Ca    8.9        23 Oct 2023 21:50  Mg     1.9       25 Oct 2023 06:48    TPro  5.9    /  Alb  2.5    /  TBili  0.8    /  DBili  x      /  AST  31     /  ALT  23     /  AlkPhos  72     25 Oct 2023 06:48  TPro  5.8    /  Alb  2.7    /  TBili  0.7    /  DBili  x      /  AST  26     /  ALT  20     /  AlkPhos  76     24 Oct 2023 05:16  TPro  6.7    /  Alb  3.0    /  TBili  0.5    /  DBili  x      /  AST  31     /  ALT  23     /  AlkPhos  91     23 Oct 2023 21:50    PT/INR - ( 23 Oct 2023 21:50 )   PT: 13.5 sec;   INR: 1.16 ratio         PTT - ( 23 Oct 2023 21:50 )  PTT:31.4 sec      Blood Culture: Organism --  Gram Stain Blood -- Gram Stain --  Specimen Source .Blood Blood-Peripheral  Culture-Blood --         CHIEF COMPLAINT: Patient is a 89y old  Male who presents with a chief complaint of Left leg wound (25 Oct 2023 12:44)    HPI:  89-year-old male with PMHx of SCC presents to the emergency department with son with report of left lower extremity infection.  Patient states that he has had a wound in his left lower leg over the summer, some states that he took him to [facility] emergency department on 8/28/2023. Pt was given a prescription for doxycycline but never took it. Patient then returned again to the emergency department 10/4/2023 at which point given the prescription for clindamycin in which he only took 3-4 doses. Son states that there has been increased drainage from the wound for the last couple of days, brought to the ED for further evaluation. Per pt, he cleans and dresses the wound daily. Has never seen podiatry in the past.   ED course  Vitals: T 97.7, HR 95, /63, RR 18, SpO2 95% on RA  Significant labs: n/a  Imaging:   CXR wnl on personal read, mild increased vascular congestion; f/u official read   Left Foot XR : no acute fx seen; f/u official read   In ED patient given: Vanc x1, Zosyn x1   (23 Oct 2023 22:28)    EKG: AFib with PVCs     REVIEW OF SYSTEMS:   All other review of systems are negative unless indicated above    PAST MEDICAL & SURGICAL HISTORY:  No pertinent past medical history      H/O hernia repair    SOCIAL HISTORY:  No tobacco, ethanol, or drug abuse.    FAMILY HISTORY:  No pertinent family history in first degree relatives      No family history of acute MI or sudden cardiac death.    MEDICATIONS  (STANDING):  cefepime   IVPB 2000 milliGRAM(s) IV Intermittent every 12 hours  enoxaparin Injectable 70 milliGRAM(s) SubCutaneous every 12 hours  tamsulosin 0.4 milliGRAM(s) Oral at bedtime  vancomycin  IVPB 1000 milliGRAM(s) IV Intermittent every 24 hours    MEDICATIONS  (PRN):  acetaminophen     Tablet .. 650 milliGRAM(s) Oral every 6 hours PRN Temp greater or equal to 38C (100.4F), Mild Pain (1 - 3)      Allergies    No Known Allergies    Intolerances        Home meds:  Home Medications:        VITAL SIGNS:   Vital Signs Last 24 Hrs  T(C): 36.5 (25 Oct 2023 11:33), Max: 36.5 (25 Oct 2023 11:33)  T(F): 97.7 (25 Oct 2023 11:33), Max: 97.7 (25 Oct 2023 11:33)  HR: 78 (25 Oct 2023 11:33) (72 - 97)  BP: 143/80 (25 Oct 2023 11:33) (137/64 - 157/83)  BP(mean): --  RR: 19 (25 Oct 2023 11:33) (17 - 20)  SpO2: 92% (25 Oct 2023 11:33) (92% - 98%)    Parameters below as of 25 Oct 2023 11:33  Patient On (Oxygen Delivery Method): room air        I&O's Summary    24 Oct 2023 07:01  -  25 Oct 2023 07:00  --------------------------------------------------------  IN: 300 mL / OUT: 150 mL / NET: 150 mL    On Exam:  TELE: Not on telemetry   Constitutional: NAD, awake and alert  HEENT: Moist Mucous Membranes, Anicteric  Pulmonary: Non-labored, breath sounds are clear bilaterally, No wheezing, rales or rhonchi  Cardiovascular: IRRR, S1 and S2, + murmurs, rubs, gallops or clicks  Gastrointestinal: Bowel Sounds present, soft, nontender.   Lymph: No peripheral edema. No lymphadenopathy.  Skin: No visible rashes or ulcers.  Psych:  Mood & affect appropriate for situation    LABS: All Labs Reviewed:                        11.6   9.64  )-----------( 294      ( 25 Oct 2023 06:48 )             36.1                         12.0   12.07 )-----------( 288      ( 24 Oct 2023 05:16 )             37.2                         13.0   9.26  )-----------( 316      ( 23 Oct 2023 21:50 )             40.6     25 Oct 2023 06:48    138    |  106    |  16     ----------------------------<  140    3.8     |  25     |  1.00   24 Oct 2023 05:16    143    |  110    |  18     ----------------------------<  133    4.5     |  29     |  1.00   23 Oct 2023 21:50    143    |  107    |  20     ----------------------------<  138    4.5     |  31     |  1.00     Ca    8.4        25 Oct 2023 06:48  Ca    8.6        24 Oct 2023 05:16  Ca    8.9        23 Oct 2023 21:50  Mg     1.9       25 Oct 2023 06:48    TPro  5.9    /  Alb  2.5    /  TBili  0.8    /  DBili  x      /  AST  31     /  ALT  23     /  AlkPhos  72     25 Oct 2023 06:48  TPro  5.8    /  Alb  2.7    /  TBili  0.7    /  DBili  x      /  AST  26     /  ALT  20     /  AlkPhos  76     24 Oct 2023 05:16  TPro  6.7    /  Alb  3.0    /  TBili  0.5    /  DBili  x      /  AST  31     /  ALT  23     /  AlkPhos  91     23 Oct 2023 21:50    PT/INR - ( 23 Oct 2023 21:50 )   PT: 13.5 sec;   INR: 1.16 ratio         PTT - ( 23 Oct 2023 21:50 )  PTT:31.4 sec      Blood Culture: Organism --  Gram Stain Blood -- Gram Stain --  Specimen Source .Blood Blood-Peripheral  Culture-Blood --

## 2023-10-25 NOTE — PROGRESS NOTE ADULT - PROBLEM SELECTOR PLAN 4
hgba1c is 6.3  - monitor  - counseling  - no intervention warranted given age normalized  nonhealing wound for at least 3 months  check arterial doppler to eval for PAD  spoke w/ podiatry - poor overall surgical candidate but further plans pending clinical course takes tamsulosin but family reports nonadherence  - resume 0.4mg qhs

## 2023-10-25 NOTE — CHART NOTE - NSCHARTNOTEFT_GEN_A_CORE
- RN called to notify that patient is agitated, non directable and increasingly combative  - Pt is already with enhanced observation, s/p 1 x zyprexa within the hour per primary team   - MD assessed, patient is agitated and confused, unable to be directed.  - denying any pain or sx, vitals are stable  - 1:1 constant observation ordered  - RN ot notify provider with any changes

## 2023-10-25 NOTE — DIETITIAN INITIAL EVALUATION ADULT - PERTINENT MEDS FT
MEDICATIONS  (STANDING):  cefepime   IVPB 2000 milliGRAM(s) IV Intermittent every 12 hours  enoxaparin Injectable 40 milliGRAM(s) SubCutaneous every 24 hours  tamsulosin 0.4 milliGRAM(s) Oral at bedtime  vancomycin  IVPB 1000 milliGRAM(s) IV Intermittent every 24 hours    MEDICATIONS  (PRN):  acetaminophen     Tablet .. 650 milliGRAM(s) Oral every 6 hours PRN Temp greater or equal to 38C (100.4F), Mild Pain (1 - 3)

## 2023-10-25 NOTE — CARE COORDINATION ASSESSMENT. - NSCAREPROVIDERS_GEN_ALL_CORE_FT
CARE PROVIDERS:  Accepting Physician: Brenda Saenz  Access Services: Gael Chaparro  Administration: Elizabeth Shelton  Admitting: Brenda Saenz  Attending: Nelson Vital  Case Management: Lawanda Porras  Consultant: Keri Cox  Consultant: Angel Echevarria  Consultant: Su Azar  Consultant: Mable Barnard  ED Attending: Dylan Puckett  ED Nurse: Mian Mcghee  Nurse: Jennifer Irvin  Nurse: Heather Thomas  Ordered: Nelson Vital  Ordered: ADM, User  Ordered: ServiceAccount, SCMMLM  Ordered: ServiceAccount, SCMMLM  Ordered: ServiceAccount, SCMMLM  Override: Donald Arana  Override: Sue Avelar  Override: Naomi Delgado  Override: Yeny Rai  Override: Rachel Crocker  Override: Lucille Roberson  Override: Nuria Kelley  Override: Natalie Roque  PCA/Nursing Assistant: Nuria Kelley  PCA/Nursing Assistant: Valeria Cazares  Physical Therapy: Jovanny Dennis  Primary Team: Unruly Hwang  Primary Team: Brenda Saenz  Primary Team: Nelson Vital  Registered Dietitian: Monse Larkin  Registered Dietitian: Zee Garcia  : Carolyne Campbell  : Nicole Sherman  Team: [facility], Team  UR// Supp. Assoc.: Elisabet Irvin

## 2023-10-25 NOTE — DIETITIAN INITIAL EVALUATION ADULT - REASON FOR ADMISSION
Cellulitis of left lower extremity    89-year-old male with PMHx of SCC presents to the emergency department with son with report of left lower extremity infection.  Admitted for LLE wound; r/o OM.

## 2023-10-25 NOTE — DIETITIAN INITIAL EVALUATION ADULT - PROBLEM SELECTOR PLAN 1
Pt presents to ED for LLE wound which has been present since 7/23.  - CXR wnl on personal read, mild increased vascular congestion; f/u official read   - Left Foot XR : no acute fx seen; f/u official read  - s/p Vanc x1, Zosyn x1, NS bolus x1  - continue vanc, zosyn   - f/u MR left foot   - f/u wound cx  - f/u BCX x2  - Podiatry consulted; f/u recs  - ID consult José Manuel

## 2023-10-26 LAB
-  AMIKACIN: SIGNIFICANT CHANGE UP
-  AMIKACIN: SIGNIFICANT CHANGE UP
-  AZTREONAM: SIGNIFICANT CHANGE UP
-  AZTREONAM: SIGNIFICANT CHANGE UP
-  CEFEPIME: SIGNIFICANT CHANGE UP
-  CEFEPIME: SIGNIFICANT CHANGE UP
-  CEFTAZIDIME: SIGNIFICANT CHANGE UP
-  CEFTAZIDIME: SIGNIFICANT CHANGE UP
-  CIPROFLOXACIN: SIGNIFICANT CHANGE UP
-  CIPROFLOXACIN: SIGNIFICANT CHANGE UP
-  GENTAMICIN: SIGNIFICANT CHANGE UP
-  GENTAMICIN: SIGNIFICANT CHANGE UP
-  IMIPENEM: SIGNIFICANT CHANGE UP
-  IMIPENEM: SIGNIFICANT CHANGE UP
-  LEVOFLOXACIN: SIGNIFICANT CHANGE UP
-  LEVOFLOXACIN: SIGNIFICANT CHANGE UP
-  MEROPENEM: SIGNIFICANT CHANGE UP
-  MEROPENEM: SIGNIFICANT CHANGE UP
-  PIPERACILLIN/TAZOBACTAM: SIGNIFICANT CHANGE UP
-  PIPERACILLIN/TAZOBACTAM: SIGNIFICANT CHANGE UP
-  TOBRAMYCIN: SIGNIFICANT CHANGE UP
-  TOBRAMYCIN: SIGNIFICANT CHANGE UP
CULTURE RESULTS: ABNORMAL
CULTURE RESULTS: ABNORMAL
METHOD TYPE: SIGNIFICANT CHANGE UP
METHOD TYPE: SIGNIFICANT CHANGE UP
ORGANISM # SPEC MICROSCOPIC CNT: ABNORMAL
ORGANISM # SPEC MICROSCOPIC CNT: ABNORMAL
ORGANISM # SPEC MICROSCOPIC CNT: SIGNIFICANT CHANGE UP
ORGANISM # SPEC MICROSCOPIC CNT: SIGNIFICANT CHANGE UP
SPECIMEN SOURCE: SIGNIFICANT CHANGE UP
SPECIMEN SOURCE: SIGNIFICANT CHANGE UP

## 2023-10-26 PROCEDURE — 99233 SBSQ HOSP IP/OBS HIGH 50: CPT

## 2023-10-26 PROCEDURE — 99232 SBSQ HOSP IP/OBS MODERATE 35: CPT

## 2023-10-26 PROCEDURE — 99222 1ST HOSP IP/OBS MODERATE 55: CPT

## 2023-10-26 RX ORDER — QUETIAPINE FUMARATE 200 MG/1
25 TABLET, FILM COATED ORAL AT BEDTIME
Refills: 0 | Status: DISCONTINUED | OUTPATIENT
Start: 2023-10-26 | End: 2023-10-31

## 2023-10-26 RX ORDER — OLANZAPINE 15 MG/1
5 TABLET, FILM COATED ORAL ONCE
Refills: 0 | Status: COMPLETED | OUTPATIENT
Start: 2023-10-26 | End: 2023-10-26

## 2023-10-26 RX ORDER — PIPERACILLIN AND TAZOBACTAM 4; .5 G/20ML; G/20ML
3.38 INJECTION, POWDER, LYOPHILIZED, FOR SOLUTION INTRAVENOUS ONCE
Refills: 0 | Status: COMPLETED | OUTPATIENT
Start: 2023-10-26 | End: 2023-10-26

## 2023-10-26 RX ORDER — PIPERACILLIN AND TAZOBACTAM 4; .5 G/20ML; G/20ML
3.38 INJECTION, POWDER, LYOPHILIZED, FOR SOLUTION INTRAVENOUS ONCE
Refills: 0 | Status: DISCONTINUED | OUTPATIENT
Start: 2023-10-26 | End: 2023-10-31

## 2023-10-26 RX ORDER — PIPERACILLIN AND TAZOBACTAM 4; .5 G/20ML; G/20ML
3.38 INJECTION, POWDER, LYOPHILIZED, FOR SOLUTION INTRAVENOUS EVERY 8 HOURS
Refills: 0 | Status: DISCONTINUED | OUTPATIENT
Start: 2023-10-26 | End: 2023-10-31

## 2023-10-26 RX ADMIN — OLANZAPINE 5 MILLIGRAM(S): 15 TABLET, FILM COATED ORAL at 10:54

## 2023-10-26 RX ADMIN — PIPERACILLIN AND TAZOBACTAM 25 GRAM(S): 4; .5 INJECTION, POWDER, LYOPHILIZED, FOR SOLUTION INTRAVENOUS at 21:58

## 2023-10-26 RX ADMIN — PIPERACILLIN AND TAZOBACTAM 200 GRAM(S): 4; .5 INJECTION, POWDER, LYOPHILIZED, FOR SOLUTION INTRAVENOUS at 14:52

## 2023-10-26 RX ADMIN — QUETIAPINE FUMARATE 25 MILLIGRAM(S): 200 TABLET, FILM COATED ORAL at 21:50

## 2023-10-26 RX ADMIN — ENOXAPARIN SODIUM 40 MILLIGRAM(S): 100 INJECTION SUBCUTANEOUS at 05:39

## 2023-10-26 RX ADMIN — TAMSULOSIN HYDROCHLORIDE 0.4 MILLIGRAM(S): 0.4 CAPSULE ORAL at 21:58

## 2023-10-26 RX ADMIN — CEFEPIME 100 MILLIGRAM(S): 1 INJECTION, POWDER, FOR SOLUTION INTRAMUSCULAR; INTRAVENOUS at 05:39

## 2023-10-26 NOTE — PROGRESS NOTE ADULT - SUBJECTIVE AND OBJECTIVE BOX
Patient is a 89y old  Male who presents with a chief complaint of Left leg wound (26 Oct 2023 07:07)      SUBJECTIVE / OVERNIGHT EVENTS: Patient seen and examined at bedside. AOx2 this morning. Last night was agitated. Per RN unable to perform MRI because of agitation. Patient knows he's here for left ankle wound, denies pain at site.    MEDICATIONS  (STANDING):  cefepime   IVPB 2000 milliGRAM(s) IV Intermittent every 12 hours  enoxaparin Injectable 40 milliGRAM(s) SubCutaneous every 24 hours  OLANZapine Injectable 5 milliGRAM(s) IntraMuscular once  tamsulosin 0.4 milliGRAM(s) Oral at bedtime    MEDICATIONS  (PRN):  acetaminophen     Tablet .. 650 milliGRAM(s) Oral every 6 hours PRN Temp greater or equal to 38C (100.4F), Mild Pain (1 - 3)  OLANZapine Injectable 2.5 milliGRAM(s) IntraMuscular every 6 hours PRN agitation      CAPILLARY BLOOD GLUCOSE        I&O's Summary    25 Oct 2023 07:01  -  26 Oct 2023 07:00  --------------------------------------------------------  IN: 300 mL / OUT: 0 mL / NET: 300 mL        PHYSICAL EXAM:  Vital Signs Last 24 Hrs  T(C): 36.3 (26 Oct 2023 04:11), Max: 37 (26 Oct 2023 02:40)  T(F): 97.4 (26 Oct 2023 04:11), Max: 98.6 (26 Oct 2023 02:40)  HR: 96 (26 Oct 2023 04:11) (65 - 98)  BP: 127/68 (26 Oct 2023 04:11) (127/68 - 169/79)  BP(mean): --  RR: 19 (26 Oct 2023 04:11) (19 - 19)  SpO2: 92% (26 Oct 2023 04:11) (92% - 93%)    Parameters below as of 26 Oct 2023 04:11  Patient On (Oxygen Delivery Method): room air        GEN: male in NAD, appears comfortable, no diaphoresis  EYES: No scleral injection, EOMI  ENTM: neck supple & symmetric without tracheal deviation, moist membranes, no gross hearing impairment, thyroid gland not enlarged  CV: +S1/S2, no m/r/g, no abdominal bruit, no LE edema  RESP: breathing comfortably, no respiratory accessory muscle use, CTAB, no w/r/r  GI: normoactive BS, soft, NTND, no rebounding/guarding, no palpable masses    LABS:                        11.6   9.64  )-----------( 294      ( 25 Oct 2023 06:48 )             36.1     10-25    138  |  106  |  16  ----------------------------<  140<H>  3.8   |  25  |  1.00    Ca    8.4<L>      25 Oct 2023 06:48  Mg     1.9     10-25    TPro  5.9<L>  /  Alb  2.5<L>  /  TBili  0.8  /  DBili  x   /  AST  31  /  ALT  23  /  AlkPhos  72  10-25          Urinalysis Basic - ( 25 Oct 2023 06:48 )    Color: x / Appearance: x / SG: x / pH: x  Gluc: 140 mg/dL / Ketone: x  / Bili: x / Urobili: x   Blood: x / Protein: x / Nitrite: x   Leuk Esterase: x / RBC: x / WBC x   Sq Epi: x / Non Sq Epi: x / Bacteria: x        Culture - Other (collected 24 Oct 2023 07:00)  Source: Wound Wound  Preliminary Report (25 Oct 2023 19:21):    Few Pseudomonas aeruginosa    Culture - Blood (collected 23 Oct 2023 21:50)  Source: .Blood Blood-Peripheral  Preliminary Report (26 Oct 2023 01:01):    No growth at 48 Hours    Culture - Blood (collected 23 Oct 2023 21:50)  Source: .Blood Blood-Peripheral  Preliminary Report (26 Oct 2023 01:01):    No growth at 48 Hours        RADIOLOGY & ADDITIONAL TESTS:  Results Reviewed:   Imaging Personally Reviewed:  Electrocardiogram Personally Reviewed:    COORDINATION OF CARE:  Care Discussed with Consultants/Other Providers [Y/N]:  Prior or Outpatient Records Reviewed [Y/N]:

## 2023-10-26 NOTE — CONSULT NOTE ADULT - SUBJECTIVE AND OBJECTIVE BOX
VASCULAR SURGERY PA CONSULT NOTE:    CHIEF COMPLAINT:  Patient is a 89y old  Male who presents with a chief complaint of Left leg wound (26 Oct 2023 13:11)      HPI:  HPI:  89-year-old male with PMHx of SCC presents to the emergency department with son with report of left lower extremity infection.  Patient states that he has had a wound in his left lower leg over the summer, some states that he took him to [facility] emergency department on 8/28/2023. Pt was given a prescription for doxycycline but never took it. Patient then returned again to the emergency department 10/4/2023 at which point given the prescription for clindamycin in which he only took 3-4 doses. Son states that there has been increased drainage from the wound for the last couple of days, brought to the ED for further evaluation. Per pt, he cleans and dresses the wound daily. Has never seen podiatry in the past.   ED course  Vitals: T 97.7, HR 95, /63, RR 18, SpO2 95% on RA  Significant labs: n/a  Imaging:   CXR wnl on personal read, mild increased vascular congestion; f/u official read   Left Foot XR : no acute fx seen; f/u official read   In ED patient given: Vanc x1, Zosyn x1   (23 Oct 2023 22:28)      INTERVAL HPI: Pt is a 89 year old male w/ PMHx of SCC, prostate cancer (s/p radiation "many years ago"), spinal stenosis, HTN (non-compliant w/ meds), BPH (non-compliant w/ meds) a/w worsening LLE wound. Seen at bedside with son, patient is a poor historian 2/2 current mental status. Per son, pt has had a wound in his left lower leg x 3 mo, brought to [facility] ED on 8/28/2023. Pt was prescribed Doxycycline but never took it. Pt returned to the ED on 10/04/2023 and was prescribed Clindamycin, however, was non-compliant with taking medication. Son endorses increased drainage and erythema from the wound during the past 2 weeks, prompting visit to ED for further evaluation on recommendation of PCP. Pt unable to have MRI as of yet, 2/2 agitation. Pt's son denies hx of DM, claudication, leg pain at rest, heart disease, CAD, prev hx of clots/bleeding disorders. Pt lives alone, ambulatory at home with cane. Unable to attain ROS from patient 2/2 mental status.    PAST MEDICAL & SURGICAL HISTORY:  No pertinent past medical history  H/O hernia repair    REVIEW OF SYSTEMS:  Unable to attain 2/2 current mental status.     MEDICATIONS:  Home Medications:    MEDICATIONS  (STANDING):  enoxaparin Injectable 40 milliGRAM(s) SubCutaneous every 24 hours  piperacillin/tazobactam IVPB.- 3.375 Gram(s) IV Intermittent once  piperacillin/tazobactam IVPB.. 3.375 Gram(s) IV Intermittent every 8 hours  tamsulosin 0.4 milliGRAM(s) Oral at bedtime    MEDICATIONS  (PRN):  acetaminophen     Tablet .. 650 milliGRAM(s) Oral every 6 hours PRN Temp greater or equal to 38C (100.4F), Mild Pain (1 - 3)  OLANZapine Injectable 2.5 milliGRAM(s) IntraMuscular every 6 hours PRN agitation      ALLERGIES:  No Known Allergies    Intolerances    SOCIAL HISTORY:  Tobacco: denies current use, remote hx when young adult (per son)  EtOH: socially, ~3 glasses of wine per week (per son)  Recreational drug use: denies  Lives with: alone  Ambulates: independent; occasional cane/walker use  ADLs: independent (23 Oct 2023 22:28)    FAMILY HISTORY:  No pertinent family history in first degree relatives    PHYSICAL EXAM:  Vital Signs Last 24 Hrs  T(C): 36.6 (26 Oct 2023 12:48), Max: 37 (26 Oct 2023 02:40)  T(F): 97.8 (26 Oct 2023 12:48), Max: 98.6 (26 Oct 2023 02:40)  HR: 70 (26 Oct 2023 12:48) (65 - 98)  BP: 106/62 (26 Oct 2023 12:48) (106/62 - 169/79)  RR: 19 (26 Oct 2023 12:48) (19 - 19)  SpO2: 91% (26 Oct 2023 12:48) (91% - 93%)    Parameters below as of 26 Oct 2023 12:48  Patient On (Oxygen Delivery Method): room air    GENERAL: NAD, resting in bed agitated; A&O x 1 (to person only)  HEAD:  Atraumatic, Normocephalic  HEENT:  (+) tropia b/l; no facial droop; no ptosis or facial edema  CHEST: Non-labored respirations, no accessory muscle use   HEART: RRR  ABD: soft, NTND  EXT: BLE without pitting edema, calves non tender to palpation bilaterally. LLE from lower 1/3 of calf to ankle with desquamation, with scant areas of purulent discharge, dressing in place c/d/i. LLE from mid calf including the foot with erythema, L foot with mild edema.  LLE posterior ankle/achillles region with wound including exposed tendon, surrounding eschar and necrotic skin edges, minimal purulence of the medial aspect and upper aspect. Wounds of the R and L hallux plantar aspect. RLE with no edema/erythema. Motor function intact. BLE without signs of tenderness, no grimacing, retraction/extension to pain. Sensation grossly in tact.   MSK: FROM all 4 extremity  DERM: wound of LLE as above. eschar of the R anterior calf. No palpable masses.  VASC:   Right:                                                                          Left:  FEM [x ]2+ [ ]1+ [ ]doppler                                             FEM [x ]2+ [ ]1+ [ ]doppler    POP [x ]2+ [ ]1+ [ ]doppler                                             POP [x ]2+ [ ]1+ [ ]doppler    DP [ ]2+ [ x]1+ [ ]doppler                                                DP [x ]2+ [ ]1+ [ ]doppler  PT [ ]2+ [x ]1+ [ ]doppler                                                  PT [x ]2+ [ ]1+ [ ]doppler    LABS:                        11.6   9.64  )-----------( 294      ( 25 Oct 2023 06:48 )             36.1     10-25    138  |  106  |  16  ----------------------------<  140<H>  3.8   |  25  |  1.00    Ca    8.4<L>      25 Oct 2023 06:48  Mg     1.9     10-25    TPro  5.9<L>  /  Alb  2.5<L>  /  TBili  0.8  /  DBili  x   /  AST  31  /  ALT  23  /  AlkPhos  72  10-25      Lactate, Blood: 1.4 mmol/L (10-23 @ 21:50)    Urinalysis Basic - ( 25 Oct 2023 06:48 )    Color: x / Appearance: x / SG: x / pH: x  Gluc: 140 mg/dL / Ketone: x  / Bili: x / Urobili: x   Blood: x / Protein: x / Nitrite: x   Leuk Esterase: x / RBC: x / WBC x   Sq Epi: x / Non Sq Epi: x / Bacteria: x      LIVER FUNCTIONS - ( 25 Oct 2023 06:48 )  Alb: 2.5 g/dL / Pro: 5.9 g/dL / ALK PHOS: 72 U/L / ALT: 23 U/L / AST: 31 U/L / GGT: x             Culture - Other (collected 24 Oct 2023 07:00)  Source: Wound Wound  Preliminary Report (25 Oct 2023 19:21):    Few Pseudomonas aeruginosa  Organism: Pseudomonas aeruginosa (26 Oct 2023 14:07)  Organism: Pseudomonas aeruginosa (26 Oct 2023 14:07)    Culture - Blood (collected 23 Oct 2023 21:50)  Source: .Blood Blood-Peripheral  Preliminary Report (26 Oct 2023 01:01):    No growth at 48 Hours    Culture - Blood (collected 23 Oct 2023 21:50)  Source: .Blood Blood-Peripheral  Preliminary Report (26 Oct 2023 01:01):    No growth at 48 Hours    RADIOLOGY & ADDITIONAL STUDIES:  < from: VA Duplex Low Ext Arterial, Ltd, Left (10.24.23 @ 12:49) >    ACC: [id number] EXAM:  DUPLEX LOW ARTERIES UNI LTD LT   ORDERED BY: BESSY ACHARYA     PROCEDURE DATE:  10/24/2023      INTERPRETATION:  US LOWER EXTREMITY ARTERIAL DUPLEX LIMITED LEFT    CLINICAL INFORMATION: Peripheral artery disease with nonhealing ulcer of   the left ankle    COMPARISON: None    Real-time sonography of the left lower extremity arterial system was   performed using a high-resolution linear array transducer and including   color and spectral Doppler.    Diffuse calcified plaque. No soft tissue abnormalities are demonstrated   in the left lower extremity. Flow phase patterns and peak systolic   velocity measurements (in cm/s) were observed as follows:    LEFT:  CFA: Triphasic; 123  Proximal SFA: Triphasic; 110  Mid SFA: Triphasic; 95  Distal SFA: Triphasic;  131  Popliteal: Triphasic;  85, 94  Anterior tibial: Monophasic; 33, 30, 54  Posterior tibial: Monophasic; 109, 105, 54  Peroneal: Monophasic;  138, 198, 160  Dorsalis pedis: Monophasic;  55    IMPRESSION:  *  Elevated velocities in the peroneal artery suggestive of a stenosis.   No evidence of occlusion.  *  Diffuse monophasic flow below the knee suggestive of underlying   disease.    --- End of Report ---    ELIAS CARR MD; Attending Radiologist  This document has been electronically signed. Oct 25 2023  5:03PM    < end of copied text >  
[facility]  INFECTIOUS DISEASES - [address], [place] [address]  Tel: [phone]     Fax: [phone]  =======================================================    [facility]-[id number]  ALICIA HERNANDEZ     CC: Patient is a 89y old  Male who presents with a chief complaint of Left leg wound (24 Oct 2023 10:02)    HPI:  89-year-old male with PMHx of SCC, who presented with left lower extremity infection.  Patient limited historian as he is very hard of hearing. He said wound started at least 3 weeks ago but per records stated that he has had a wound in his left lower leg over the summer. Pt was given a prescription for doxycycline but never took it. Patient then returned again to the emergency department 10/4/2023 at which point given the prescription for clindamycin in which he only took 3-4 doses. Son states that there has been increased drainage from the wound for the last couple of days, brought to the ED for further evaluation. Has never seen podiatry in the past. Denies any leg pain currently.        PAST MEDICAL & SURGICAL HISTORY:  No pertinent past medical history      H/O hernia repair          Social Hx:     FAMILY HISTORY:  No pertinent family history in first degree relatives        Allergies    No Known Allergies    Intolerances        Antibiotics:  MEDICATIONS  (STANDING):  cefepime   IVPB 2000 milliGRAM(s) IV Intermittent every 12 hours  enoxaparin Injectable 40 milliGRAM(s) SubCutaneous every 24 hours  tamsulosin 0.4 milliGRAM(s) Oral at bedtime  vancomycin  IVPB 1000 milliGRAM(s) IV Intermittent every 12 hours    MEDICATIONS  (PRN):  acetaminophen     Tablet .. 650 milliGRAM(s) Oral every 6 hours PRN Temp greater or equal to 38C (100.4F), Mild Pain (1 - 3)       REVIEW OF SYSTEMS: *limited as very hard of hearing*  CONSTITUTIONAL:  No Fevers  CARDIOVASCULAR:  No chest pain or SOB.  RESPIRATORY:  No cough, shortness of breath  GASTROINTESTINAL:  No nausea, vomiting or diarrhea.  SKIN: see history  NEUROLOGIC:  No headache    Physical Exam:  Vital Signs Last 24 Hrs  T(C): 36.8 (24 Oct 2023 08:08), Max: 36.8 (24 Oct 2023 08:08)  T(F): 98.2 (24 Oct 2023 08:08), Max: 98.2 (24 Oct 2023 08:08)  HR: 83 (24 Oct 2023 08:08) (83 - 95)  BP: 132/68 (24 Oct 2023 08:08) (120/63 - 158/54)  BP(mean): --  RR: 18 (24 Oct 2023 08:08) (18 - 18)  SpO2: 96% (24 Oct 2023 08:08) (95% - 98%)    Parameters below as of 24 Oct 2023 08:08  Patient On (Oxygen Delivery Method): room air      Height (cm): 180.3 (10-23 @ 16:57)  Weight (kg): 72.6 (10-23 @ 16:57)  BMI (kg/m2): 22.3 (10-23 @ 16:57)  BSA (m2): 1.92 (10-23 @ 16:57)  GEN: NAD  HEENT: normocephalic and atraumatic.   NECK: Supple.   LUNGS: Clear to auscultation.  HEART: Regular rate and rhythm   ABDOMEN: Soft, nontender, and nondistended.    EXTREMITIES: No leg edema.  NEUROLOGIC: Answering some simple questions  SKIN: (+) large ulceration on posterior L lower calf with necrotic tissue    Labs:  10-24    143  |  110<H>  |  18  ----------------------------<  133<H>  4.5   |  29  |  1.00    Ca    8.6      24 Oct 2023 05:16    TPro  5.8<L>  /  Alb  2.7<L>  /  TBili  0.7  /  DBili  x   /  AST  26  /  ALT  20  /  AlkPhos  76  10-24                          12.0   12.07 )-----------( 288      ( 24 Oct 2023 05:16 )             37.2     PT/INR - ( 23 Oct 2023 21:50 )   PT: 13.5 sec;   INR: 1.16 ratio         PTT - ( 23 Oct 2023 21:50 )  PTT:31.4 sec  Urinalysis Basic - ( 24 Oct 2023 05:16 )    Color: x / Appearance: x / SG: x / pH: x  Gluc: 133 mg/dL / Ketone: x  / Bili: x / Urobili: x   Blood: x / Protein: x / Nitrite: x   Leuk Esterase: x / RBC: x / WBC x   Sq Epi: x / Non Sq Epi: x / Bacteria: x      LIVER FUNCTIONS - ( 24 Oct 2023 05:16 )  Alb: 2.7 g/dL / Pro: 5.8 g/dL / ALK PHOS: 76 U/L / ALT: 20 U/L / AST: 26 U/L / GGT: x                   C-Reactive Protein, Serum: 41 mg/L (10-23-23 @ 21:50)    Sedimentation Rate, Erythrocyte: 23 mm/hr (10-23-23 @ 21:50)        RECENT CULTURES:        All imaging and other data have been reviewed.      < from: Xray Chest 1 View AP/PA (10.23.23 @ 20:56) >  FINDINGS:    Single frontal view of the chest demonstrates small bilateral lower lobe   infiltrates/atelectasis. The cardiomediastinal silhouette is enlarged. No   acute osseous abnormalities. Consider chest CT for further evaluation.    IMPRESSION: Small bilateral lower lobe infiltrates/atelectasis.    < end of copied text >    < from: Xray Tibia + Fibula 2 Views, Left (10.23.23 @ 20:56) >  FINDINGS: There is a large deep wound along the posterior lower leg.   There is no gross sinus tract, subcutaneous emphysema or gross plain film   evidence of osteomyelitis. There is mild periosteal reaction involving   the distal fibula. There is no acute fracture or dislocation. There is   mild atherosclerotic change. There is spurring at the patella tendon   insertion on the tibia and origin from the patella. Mild degenerative   changes in the foot. Moderate sized retrocalcaneal and tiny plantar spur.   No radiopaque foreign body.    IMPRESSION:    There is a large deep wound along the posterior lower leg.  There is no gross sinus tract, subcutaneous emphysema or gross plain film   evidence of osteomyelitis.  There is mild periosteal reaction involving the distal fibula.    MRI pending    < end of copied text >    
HPI:  89-year-old male seen for left lower extremity wound infection.  Of note is that patient is a poor historian and history is obtained from medical records.  Patient has had the left posterior ankle wound, left submetatarsal head 1 wound, plantar left hallux wound for approximately 3 months.  Patient was previously seen at [facility] and was prescribed oral antibiotics.  Patient was brought to the hospital as he left lower extremity wounds has continued to worsen and not improve.      PAST MEDICAL & SURGICAL HISTORY:  No pertinent past medical history      H/O hernia repair          Allergies    No Known Allergies    Intolerances        MEDICATIONS  (STANDING):  cefepime   IVPB 2000 milliGRAM(s) IV Intermittent every 12 hours  enoxaparin Injectable 40 milliGRAM(s) SubCutaneous every 24 hours  tamsulosin 0.4 milliGRAM(s) Oral at bedtime  vancomycin  IVPB 1000 milliGRAM(s) IV Intermittent every 24 hours    MEDICATIONS  (PRN):  acetaminophen     Tablet .. 650 milliGRAM(s) Oral every 6 hours PRN Temp greater or equal to 38C (100.4F), Mild Pain (1 - 3)      Social History:  Tobacco: denies  EtOH:  denies  Recreational drug use: denies  Lives with: alone  Ambulates: independent; occasional cane/walker use  ADLs: independent (23 Oct 2023 22:28)      FAMILY HISTORY:  No pertinent family history in first degree relatives        Vital Signs Last 24 Hrs  T(C): 36.2 (24 Oct 2023 15:25), Max: 36.8 (24 Oct 2023 08:08)  T(F): 97.2 (24 Oct 2023 15:25), Max: 98.2 (24 Oct 2023 08:08)  HR: 72 (24 Oct 2023 15:25) (72 - 95)  BP: 157/83 (24 Oct 2023 15:25) (132/68 - 158/54)  BP(mean): --  RR: 18 (24 Oct 2023 15:25) (17 - 18)  SpO2: 97% (24 Oct 2023 15:25) (96% - 98%)    Parameters below as of 24 Oct 2023 15:25  Patient On (Oxygen Delivery Method): room air        PHYSICAL EXAM:  Vascular: DP & PT faintly palpable bilaterally, Capillary refill 5 seconds  Neurological: Light touch sensation diminished bilaterally  Musculoskeletal: 4/5 strength in all quadrants bilaterally, AJ & STJ ROM intact  Dermatological:  Left posterior ankle wound down to skin, subcutaneous tissue, fat, tendon, bone, left ankle circumferential erythema noted, no purulence, no proximal streaking, no fluctuance.  Left foot submet head 1 and left plantar hallux wound down to skin, subcutaneous tissue, fat, no purulence, no probe to bone, no periwound erythema, no proximal streaking, no fluctuance.                          12.0   12.07 )-----------( 288      ( 24 Oct 2023 05:16 )             37.2       10-24    143  |  110<H>  |  18  ----------------------------<  133<H>  4.5   |  29  |  1.00    Ca    8.6      24 Oct 2023 05:16    TPro  5.8<L>  /  Alb  2.7<L>  /  TBili  0.7  /  DBili  x   /  AST  26  /  ALT  20  /  AlkPhos  76  10-24      PT/INR - ( 23 Oct 2023 21:50 )   PT: 13.5 sec;   INR: 1.16 ratio         PTT - ( 23 Oct 2023 21:50 )  PTT:31.4 sec        Imaging: Radiographs do not show any soft tissue emphysema, no cortical erosions, no periostitis, no fractures/subluxations/dislocations

## 2023-10-26 NOTE — PROGRESS NOTE ADULT - ASSESSMENT
89-year-old male with PMHx of SCC, HTN , spinal stenosis presents to the emergency department with son with report of left lower extremity infection concern for OM now with concern for afib on his EKG.     Abnormal EKG, possible Afib,, cardiac optimization   - p/w Left lower extremity infection, w/u ongoing concerning for OM, podiatry following     - EKG: irregular rhythm, possible Afib vs SR with mobitz type I and PACs  - New onset, unknown duration, per son (bedside), they were told about skipped beats, not Afib denies taking AC and rate controlling agent  - No sign of acute ischemia  - [id number] score: 3 (age, hx htn)   - Given advanced age, ? compliance with meds, hx of delirium, on enhanced observation for safety, he is poor candidate for long term AC, risks outweighs benefits    - No sign of volume overload, non orthopneic, no O2 requirement   - Can obtain 2 D echo to evaluate function and to r/o thrombus     - -160s   - He was on losartan however patient stopped taking 2/2 to feeling tired after taking two doses     - Pt for MRI to r/o osteo. Possible OR for a debridement afterwards    - Monitor and replete lytes, keep K>4, Mg>2.  - Will continue to follow.    Vicki Gage, MS FNP, AGACNP  Nurse Practitioner- Cardiology   Please call on TEAMS

## 2023-10-26 NOTE — PROGRESS NOTE ADULT - PROBLEM SELECTOR PLAN 1
LLE wound, significant drainage, circumferentially affected the ankle  - ID consult appreciated  - Wound culture with few pseudomonas  - Continue with Cefepime  - Vanc subtherapeutic, hold for now unless ID wants to restart (no MRSA isolated)  - elevated inflammatory markers noted  - MR L foot/ankle is pending  - Podiatry consult noted  - Left US: "Elevated velocities in the peroneal artery suggestive of a stenosis. No evidence of occlusion. Diffuse monophasic flow below the knee suggestive of underlying disease."  - Will require vascular consult prior to podiatric intervention for viability

## 2023-10-26 NOTE — PROGRESS NOTE ADULT - SUBJECTIVE AND OBJECTIVE BOX
[facility] -- Genny Mora, Segun Kunz, Julio Cesar, Jigar Mccallum: Office # [phone]    Follow Up:  Abn EKG    Subjective/Observations: Patient seen and examined. Patient awake, alert, resting in bed. No complaints of chest pain, dyspnea, palpitations or dizziness. No signs of orthopnea or PND. Tolerating room air. LLE DSD intact.     REVIEW OF SYSTEMS: All other review of systems are negative unless indicated above    PAST MEDICAL & SURGICAL HISTORY:  No pertinent past medical history  H/O hernia repair    MEDICATIONS  (STANDING):  cefepime   IVPB 2000 milliGRAM(s) IV Intermittent every 12 hours  enoxaparin Injectable 40 milliGRAM(s) SubCutaneous every 24 hours  OLANZapine Injectable 5 milliGRAM(s) IntraMuscular once  tamsulosin 0.4 milliGRAM(s) Oral at bedtime    MEDICATIONS  (PRN):  acetaminophen     Tablet .. 650 milliGRAM(s) Oral every 6 hours PRN Temp greater or equal to 38C (100.4F), Mild Pain (1 - 3)  OLANZapine Injectable 2.5 milliGRAM(s) IntraMuscular every 6 hours PRN agitation    Allergies  No Known Allergies    Vital Signs Last 24 Hrs  T(C): 36.3 (26 Oct 2023 04:11), Max: 37 (26 Oct 2023 02:40)  T(F): 97.4 (26 Oct 2023 04:11), Max: 98.6 (26 Oct 2023 02:40)  HR: 96 (26 Oct 2023 04:11) (65 - 98)  BP: 127/68 (26 Oct 2023 04:11) (127/68 - 169/79)  BP(mean): --  RR: 19 (26 Oct 2023 04:11) (19 - 19)  SpO2: 92% (26 Oct 2023 04:11) (92% - 93%)    Parameters below as of 26 Oct 2023 04:11  Patient On (Oxygen Delivery Method): room air      I&O's Summary    25 Oct 2023 07:01  -  26 Oct 2023 07:00  --------------------------------------------------------  IN: 300 mL / OUT: 0 mL / NET: 300 mL          TELE: Not on telemetry   PHYSICAL EXAM:  Constitutional: NAD, awake and alert  HEENT: Moist Mucous Membranes, Anicteric  Pulmonary: Non-labored, breath sounds are clear bilaterally, No wheezing, rales or rhonchi  Cardiovascular: Regular, S1 and S2, + murmurs, No rubs, gallops or clicks  Gastrointestinal:  soft, nontender, nondistended   Lymph: +1 LLE peripheral edema. No lymphadenopathy.   Skin: No visible rashes Lt LE DSD  Psych:  Mood & affect appropriate      LABS: All Labs Reviewed:                        11.6   9.64  )-----------( 294      ( 25 Oct 2023 06:48 )             36.1                         12.0   12.07 )-----------( 288      ( 24 Oct 2023 05:16 )             37.2                         13.0   9.26  )-----------( 316      ( 23 Oct 2023 21:50 )             40.6     25 Oct 2023 06:48    138    |  106    |  16     ----------------------------<  140    3.8     |  25     |  1.00   24 Oct 2023 05:16    143    |  110    |  18     ----------------------------<  133    4.5     |  29     |  1.00   23 Oct 2023 21:50    143    |  107    |  20     ----------------------------<  138    4.5     |  31     |  1.00     Ca    8.4        25 Oct 2023 06:48  Ca    8.6        24 Oct 2023 05:16  Ca    8.9        23 Oct 2023 21:50  Mg     1.9       25 Oct 2023 06:48    TPro  5.9    /  Alb  2.5    /  TBili  0.8    /  DBili  x      /  AST  31     /  ALT  23     /  AlkPhos  72     25 Oct 2023 06:48  TPro  5.8    /  Alb  2.7    /  TBili  0.7    /  DBili  x      /  AST  26     /  ALT  20     /  AlkPhos  76     24 Oct 2023 05:16  TPro  6.7    /  Alb  3.0    /  TBili  0.5    /  DBili  x      /  AST  31     /  ALT  23     /  AlkPhos  91     23 Oct 2023 21:50   LIVER FUNCTIONS - ( 25 Oct 2023 06:48 )  Alb: 2.5 g/dL / Pro: 5.9 g/dL / ALK PHOS: 72 U/L / ALT: 23 U/L / AST: 31 U/L / GGT: x           Lactate, Blood: 1.4 mmol/L (10-23-23 @ 21:50)    12 Lead ECG:   Ventricular Rate 97 BPM    QRS Duration 114 ms    Q-T Interval 336 ms    QTC Calculation(Bazett) 426 ms    R Axis -30 degrees    T Axis 149 degrees    Diagnosis Line Atrial fibrillation with premature ventricular or aberrantly conducted complexes  Left axis deviation  Moderate voltage criteria for LVH, may be normal variant ( R in aVL , [place] product )  ST & T wave abnormality, consider lateral ischemia  Confirmed by JENNA KUNZ ([id number]) on 10/24/2023 11:15:55 AM (10-24-23 @ 06:27)

## 2023-10-26 NOTE — PROGRESS NOTE ADULT - SUBJECTIVE AND OBJECTIVE BOX
[facility]  INFECTIOUS DISEASES - [address], [place] [address]  Tel: [phone]     Fax: [phone]  =======================================================    ALICIA HERNANDEZ [id number]    Follow up: No fevers. Appears confused. Unable to tolerate MRI yesterday.    Allergies:  No Known Allergies      Antibiotics:  acetaminophen     Tablet .. 650 milliGRAM(s) Oral every 6 hours PRN  enoxaparin Injectable 40 milliGRAM(s) SubCutaneous every 24 hours  OLANZapine Injectable 2.5 milliGRAM(s) IntraMuscular every 6 hours PRN  piperacillin/tazobactam IVPB. 3.375 Gram(s) IV Intermittent once  piperacillin/tazobactam IVPB.- 3.375 Gram(s) IV Intermittent once  piperacillin/tazobactam IVPB.. 3.375 Gram(s) IV Intermittent every 8 hours  tamsulosin 0.4 milliGRAM(s) Oral at bedtime       REVIEW OF SYSTEMS:  unable to obtain 2/2 mental status     Physical Exam:  ICU Vital Signs Last 24 Hrs  T(C): 36.6 (26 Oct 2023 12:48), Max: 37 (26 Oct 2023 02:40)  T(F): 97.8 (26 Oct 2023 12:48), Max: 98.6 (26 Oct 2023 02:40)  HR: 70 (26 Oct 2023 12:48) (65 - 98)  BP: 106/62 (26 Oct 2023 12:48) (106/62 - 169/79)  BP(mean): --  ABP: --  ABP(mean): --  RR: 19 (26 Oct 2023 12:48) (19 - 19)  SpO2: 91% (26 Oct 2023 12:48) (91% - 93%)    O2 Parameters below as of 26 Oct 2023 12:48  Patient On (Oxygen Delivery Method): room air      GEN: Not in apparent distress  HEENT: normocephalic and atraumatic.   NECK: Supple.   LUNGS: Normal respiratory effort  HEART: Regular rate and rhythm   ABDOMEN: Soft, nontender, and nondistended.    EXTREMITIES: No leg edema.  NEUROLOGIC: Appears confused  SKIN: (+) large ulceration on posterior L lower calf with necrotic tissue    Labs:  10-25    138  |  106  |  16  ----------------------------<  140<H>  3.8   |  25  |  1.00    Ca    8.4<L>      25 Oct 2023 06:48  Mg     1.9     10-25    TPro  5.9<L>  /  Alb  2.5<L>  /  TBili  0.8  /  DBili  x   /  AST  31  /  ALT  23  /  AlkPhos  72  10-25                          11.6   9.64  )-----------( 294      ( 25 Oct 2023 06:48 )             36.1       Urinalysis Basic - ( 25 Oct 2023 06:48 )    Color: x / Appearance: x / SG: x / pH: x  Gluc: 140 mg/dL / Ketone: x  / Bili: x / Urobili: x   Blood: x / Protein: x / Nitrite: x   Leuk Esterase: x / RBC: x / WBC x   Sq Epi: x / Non Sq Epi: x / Bacteria: x      LIVER FUNCTIONS - ( 25 Oct 2023 06:48 )  Alb: 2.5 g/dL / Pro: 5.9 g/dL / ALK PHOS: 72 U/L / ALT: 23 U/L / AST: 31 U/L / GGT: x             RECENT CULTURES:  10-24 @ 07:00 Wound Wound     Few Pseudomonas aeruginosa        10-23 @ 21:50 .Blood Blood-Peripheral     No growth at 48 Hours              All imaging and data are reviewed.

## 2023-10-26 NOTE — PROGRESS NOTE ADULT - PROBLEM SELECTOR PLAN 3
- Likely has component of dementia  - Zyprexa 2.5 mg IM q6 hours PRN agitation (QTc okay)  - palliative care also assessed - MOLST form updated - DNR and DNI  - Likely will start Seroquel 25 mg qhs to maintain diurnal cycle  - Avoid benzodiazepines which have anti-cholinergic effects & can make delirium worse

## 2023-10-26 NOTE — PROGRESS NOTE ADULT - PROBLEM SELECTOR PLAN 2
elevated chadsvasc score  - the pt is poor candidate for long term AC - spoke w/ pt's son (HCP) and he agrees that therapeutic AC risk>benefit. will plan for pharmacologic VTE ppx while admitted then will not continue AC at time of dc  - pt does not wish to take any meds - he never took his BP meds historically due to "fatigue" and he will unlikely take any norbert blockers even if recommended - family agrees  - rates controlled at present  - spoke w/ cardio about above - conservative tx plan

## 2023-10-26 NOTE — PROGRESS NOTE ADULT - ASSESSMENT
89-year-old male with PMHx of SCC, who presented with left lower extremity wound. Concern for infected LLE ulcer, and given appearance and depth of wound concern for deeper underlying infection. Xray showed no subcutaneous emphysema or gross osteomyelitis. Patient unable to tolerate MRI. so CT ordered for further evaluation.    Remains afebrile and blood cultures remain no growth. Wound culture growing pseudomonas.    #LLE infected ulcer  #Leukocytosis    -suggest Zosyn  -discontinue vancomycin and cefepime  -follow cultures to completion  -if unable to tolerate MRI suggest CT left lower leg  -wound care    Su Azar MD  Division of Infectious Diseases   Cell [phone] between 8am and 6pm   After 6pm and weekends please call ID service at [phone].

## 2023-10-26 NOTE — CONSULT NOTE ADULT - ASSESSMENT
89yo Male with PMHx of SCC, prostate cancer (s/p radiation "many years ago"), spinal stenosis, HTN (non-compliant w/ meds), BPH (non-compliant w/ meds) a/w worsening LLE wound x 3 months, previously prescribed antibiotics when seen in ER (aug, oct 2023) however non-compliant. Vascular surgery consulted for LLE wound. Today pt with VSSAF, LLE with deep wound of the posterior ankle/achilles region wiht exposed tendon, surrounding wound edges necrotic with eschar; lower 1/3 L calf with circumferential desquamation with some purulent drainage; LLE with calf & foot erythema, L foot with trace edema, motor function and sensation grossly intact; BLE with palpable femoral, popliteal, DP and PT pulses.  Labs with resolved leukocytosis, wound culture with pseudomonas aeruginosa currently on Zosyn per ID. LLE arterial duplex with findings of elevated velocities in the peroneal artery suggestive of stenosis, wihtout evidence of occlusion, monophasic flow suggestive of underlying disease. Pt pending MRI or CT scan for eval of extension of disease and r/o OM, however patient has not been compliant 2/2 agitation. Pt likely to go today for CT.     PLAN:  - VSSAF, LLE with deep wound of the posterior ankle/achilles region with exposed tendon, surrounding wound edges necrotic with eschar, LLE with erythema and L foot with edema. BLE pulses palpable, including femoral, popliteal, DP and PT. Labs with resolved leukocytosis,   - Wound culture with pseudomonas aeruginosa, continue IV abx per ID (currently on zosyn)  - LLE arterial duplex with findings of elevated velocities in the peroneal artery suggestive of stenosis, without evidence of occlusion, monophasic flow suggestive of underlying disease.  - Pt seen by podiatry, wound care per podiatry.   - Rest of care per primary team.    To discuss with Dr. Rosario.    89yo Male with PMHx of SCC, prostate cancer (s/p radiation "many years ago"), spinal stenosis, HTN (non-compliant w/ meds), BPH (non-compliant w/ meds) a/w worsening LLE wound x 3 months, previously prescribed antibiotics when seen in ER (aug, oct 2023) however non-compliant. Vascular surgery consulted for LLE wound. Today pt with VSSAF, LLE with deep wound of the posterior ankle/achilles region wiht exposed tendon, surrounding wound edges necrotic with eschar; lower 1/3 L calf with circumferential desquamation with some purulent drainage; LLE with calf & foot erythema, L foot with trace edema, motor function and sensation grossly intact; BLE with palpable femoral, popliteal, DP and PT pulses.  Labs with resolved leukocytosis, wound culture with pseudomonas aeruginosa currently on Zosyn per ID. LLE arterial duplex with findings of elevated velocities in the peroneal artery suggestive of stenosis, wihtout evidence of occlusion, monophasic flow suggestive of underlying disease. Pt pending MRI or CT scan for eval of extension of disease and r/o OM, however patient has not been compliant 2/2 agitation. Pt likely to go today for CT.     PLAN:  - VSSAF, LLE with deep wound of the posterior ankle/achilles region with exposed tendon, surrounding wound edges necrotic with eschar, LLE with erythema and L foot with edema. BLE pulses palpable, including femoral, popliteal, DP and PT. Labs with resolved leukocytosis,   - Wound culture with pseudomonas aeruginosa, continue IV abx per ID (currently on zosyn)  - LLE arterial duplex with findings of elevated velocities in the peroneal artery suggestive of stenosis, without evidence of occlusion, monophasic flow suggestive of underlying disease.  - Pt seen by podiatry, wound care per podiatry.   - No vascular intervention indicated at this time. Vascular surgery to sign off, please reconsult PRN.     Discussed with Dr. Rosario.

## 2023-10-26 NOTE — PROGRESS NOTE ADULT - ASSESSMENT
89-year-old male with PMHx of SCC presents to the emergency department with son with report of left lower extremity wound with exposed bone/tendon.

## 2023-10-27 LAB
ANION GAP SERPL CALC-SCNC: 7 MMOL/L — SIGNIFICANT CHANGE UP (ref 5–17)
ANION GAP SERPL CALC-SCNC: 7 MMOL/L — SIGNIFICANT CHANGE UP (ref 5–17)
BUN SERPL-MCNC: 19 MG/DL — SIGNIFICANT CHANGE UP (ref 7–23)
BUN SERPL-MCNC: 19 MG/DL — SIGNIFICANT CHANGE UP (ref 7–23)
CALCIUM SERPL-MCNC: 8.8 MG/DL — SIGNIFICANT CHANGE UP (ref 8.5–10.1)
CALCIUM SERPL-MCNC: 8.8 MG/DL — SIGNIFICANT CHANGE UP (ref 8.5–10.1)
CHLORIDE SERPL-SCNC: 108 MMOL/L — SIGNIFICANT CHANGE UP (ref 96–108)
CHLORIDE SERPL-SCNC: 108 MMOL/L — SIGNIFICANT CHANGE UP (ref 96–108)
CO2 SERPL-SCNC: 29 MMOL/L — SIGNIFICANT CHANGE UP (ref 22–31)
CO2 SERPL-SCNC: 29 MMOL/L — SIGNIFICANT CHANGE UP (ref 22–31)
CREAT SERPL-MCNC: 1.1 MG/DL — SIGNIFICANT CHANGE UP (ref 0.5–1.3)
CREAT SERPL-MCNC: 1.1 MG/DL — SIGNIFICANT CHANGE UP (ref 0.5–1.3)
EGFR: 60 ML/MIN/1.73M2 — SIGNIFICANT CHANGE UP
EGFR: 60 ML/MIN/1.73M2 — SIGNIFICANT CHANGE UP
GLUCOSE SERPL-MCNC: 121 MG/DL — HIGH (ref 70–99)
GLUCOSE SERPL-MCNC: 121 MG/DL — HIGH (ref 70–99)
HCT VFR BLD CALC: 37.5 % — LOW (ref 39–50)
HCT VFR BLD CALC: 37.5 % — LOW (ref 39–50)
HGB BLD-MCNC: 12.1 G/DL — LOW (ref 13–17)
HGB BLD-MCNC: 12.1 G/DL — LOW (ref 13–17)
MCHC RBC-ENTMCNC: 27.4 PG — SIGNIFICANT CHANGE UP (ref 27–34)
MCHC RBC-ENTMCNC: 27.4 PG — SIGNIFICANT CHANGE UP (ref 27–34)
MCHC RBC-ENTMCNC: 32.3 GM/DL — SIGNIFICANT CHANGE UP (ref 32–36)
MCHC RBC-ENTMCNC: 32.3 GM/DL — SIGNIFICANT CHANGE UP (ref 32–36)
MCV RBC AUTO: 84.8 FL — SIGNIFICANT CHANGE UP (ref 80–100)
MCV RBC AUTO: 84.8 FL — SIGNIFICANT CHANGE UP (ref 80–100)
NRBC # BLD: 0 /100 WBCS — SIGNIFICANT CHANGE UP (ref 0–0)
NRBC # BLD: 0 /100 WBCS — SIGNIFICANT CHANGE UP (ref 0–0)
PLATELET # BLD AUTO: 290 K/UL — SIGNIFICANT CHANGE UP (ref 150–400)
PLATELET # BLD AUTO: 290 K/UL — SIGNIFICANT CHANGE UP (ref 150–400)
POTASSIUM SERPL-MCNC: 3.8 MMOL/L — SIGNIFICANT CHANGE UP (ref 3.5–5.3)
POTASSIUM SERPL-MCNC: 3.8 MMOL/L — SIGNIFICANT CHANGE UP (ref 3.5–5.3)
POTASSIUM SERPL-SCNC: 3.8 MMOL/L — SIGNIFICANT CHANGE UP (ref 3.5–5.3)
POTASSIUM SERPL-SCNC: 3.8 MMOL/L — SIGNIFICANT CHANGE UP (ref 3.5–5.3)
RBC # BLD: 4.42 M/UL — SIGNIFICANT CHANGE UP (ref 4.2–5.8)
RBC # BLD: 4.42 M/UL — SIGNIFICANT CHANGE UP (ref 4.2–5.8)
RBC # FLD: 13.6 % — SIGNIFICANT CHANGE UP (ref 10.3–14.5)
RBC # FLD: 13.6 % — SIGNIFICANT CHANGE UP (ref 10.3–14.5)
SODIUM SERPL-SCNC: 144 MMOL/L — SIGNIFICANT CHANGE UP (ref 135–145)
SODIUM SERPL-SCNC: 144 MMOL/L — SIGNIFICANT CHANGE UP (ref 135–145)
WBC # BLD: 13.24 K/UL — HIGH (ref 3.8–10.5)
WBC # BLD: 13.24 K/UL — HIGH (ref 3.8–10.5)
WBC # FLD AUTO: 13.24 K/UL — HIGH (ref 3.8–10.5)
WBC # FLD AUTO: 13.24 K/UL — HIGH (ref 3.8–10.5)

## 2023-10-27 PROCEDURE — 99233 SBSQ HOSP IP/OBS HIGH 50: CPT

## 2023-10-27 PROCEDURE — 73720 MRI LWR EXTREMITY W/O&W/DYE: CPT | Mod: 26,LT

## 2023-10-27 PROCEDURE — 99232 SBSQ HOSP IP/OBS MODERATE 35: CPT

## 2023-10-27 PROCEDURE — 73723 MRI JOINT LWR EXTR W/O&W/DYE: CPT | Mod: 26,LT

## 2023-10-27 PROCEDURE — 99497 ADVNCD CARE PLAN 30 MIN: CPT

## 2023-10-27 RX ORDER — OLANZAPINE 15 MG/1
5 TABLET, FILM COATED ORAL ONCE
Refills: 0 | Status: COMPLETED | OUTPATIENT
Start: 2023-10-27 | End: 2023-10-27

## 2023-10-27 RX ADMIN — PIPERACILLIN AND TAZOBACTAM 25 GRAM(S): 4; .5 INJECTION, POWDER, LYOPHILIZED, FOR SOLUTION INTRAVENOUS at 05:53

## 2023-10-27 RX ADMIN — PIPERACILLIN AND TAZOBACTAM 25 GRAM(S): 4; .5 INJECTION, POWDER, LYOPHILIZED, FOR SOLUTION INTRAVENOUS at 12:47

## 2023-10-27 RX ADMIN — ENOXAPARIN SODIUM 40 MILLIGRAM(S): 100 INJECTION SUBCUTANEOUS at 05:55

## 2023-10-27 RX ADMIN — TAMSULOSIN HYDROCHLORIDE 0.4 MILLIGRAM(S): 0.4 CAPSULE ORAL at 22:08

## 2023-10-27 RX ADMIN — QUETIAPINE FUMARATE 25 MILLIGRAM(S): 200 TABLET, FILM COATED ORAL at 22:08

## 2023-10-27 RX ADMIN — PIPERACILLIN AND TAZOBACTAM 25 GRAM(S): 4; .5 INJECTION, POWDER, LYOPHILIZED, FOR SOLUTION INTRAVENOUS at 22:07

## 2023-10-27 RX ADMIN — OLANZAPINE 5 MILLIGRAM(S): 15 TABLET, FILM COATED ORAL at 12:57

## 2023-10-27 NOTE — PROGRESS NOTE ADULT - ASSESSMENT
89-year-old male with PMHx of SCC, HTN , spinal stenosis presents to the emergency department with son with report of left lower extremity infection concern for OM now with concern for afib on his EKG.     Abnormal EKG, possible Afib,, cardiac optimization   - EKG: irregular rhythm, possible Afib vs SR with mobitz type I and PACs  - New onset, unknown duration, per son (bedside), they were told about skipped beats, not Afib.  Not on AC or AVN blockers  - No sign of acute ischemia  - [id number] score: 3 (age, hx htn)   - Given advanced age, ? compliance with meds, hx of delirium, on enhanced observation for safety, he is poor candidate for long term AC, risks outweighs benefits    - No sign of volume overload, non orthopneic, no O2 requirement   - BP labile at sytoic 100-160s   - Off home Losartan.  Will not aggressively treat BP at this time    - Pt for MRI to r/o osteo. Possible OR for a debridement afterwards  - Left lower extremity infection, w/u ongoing concerning for OM, podiatry following   - No vascular intervention at this point  - Podia following    - Monitor and replete lytes, keep K>4, Mg>2.  - Will continue to follow.    Tawny Antoine DNP, NP-C, AGACNP-C  Cardiology   Call TEAMS          89-year-old male with PMHx of SCC, HTN , spinal stenosis presents to the emergency department with son with report of left lower extremity infection concern for OM now with concern for afib on his EKG.     Abnormal EKG, possible Afib,, cardiac optimization   - EKG: irregular rhythm, possible Afib vs SR with mobitz type I and PACs  - New onset, unknown duration, per son (bedside), they were told about skipped beats, not Afib.  Not on AC or AVN blockers  - No sign of acute ischemia  - [id number] score: 3 (age, hx htn)   - Given advanced age, ? compliance with meds, hx of delirium, on enhanced observation for safety, he is poor candidate for long term AC, risks outweighs benefits    - No sign of volume overload, non orthopneic, no O2 requirement   - BP labile at systoic 100-160s   - Off home Losartan.  Will not aggressively treat BP at this time    - Pt for MRI to r/o osteo. Possible OR for a debridement afterwards  - Left lower extremity infection, w/u ongoing concerning for OM, podiatry following   - No vascular intervention at this point  - Podia following    - Monitor and replete lytes, keep K>4, Mg>2.  - Will continue to follow.    Tawny Antoine DNP, NP-C, AGACNP-C  Cardiology   Call TEAMS          89-year-old male with PMHx of SCC, HTN , spinal stenosis presents to the emergency department with son with report of left lower extremity infection concern for OM now with concern for afib on his EKG.     Abnormal EKG, possible Afib,, cardiac optimization   - EKG: irregular rhythm, was felt to be possible Afib vs SR with mobitz type I and PACs  -has seemed to be more convincingly sr on more recent review  - New onset, unknown duration, per son (bedside), they were told about skipped beats, not Afib.  Not on AC or AVN blockers  - No sign of acute ischemia  - [id number] score: 3 (age, hx htn)   - Given advanced age, ? compliance with meds, hx of delirium, on enhanced observation for safety, he is poor candidate for long term AC, risks outweighs benefits    - No sign of volume overload, non orthopneic, no O2 requirement   - BP labile at systoic 100-160s   - Off home Losartan.  Will not aggressively treat BP at this time    - Pt for MRI to r/o osteo. Possible OR for a debridement afterwards  - Left lower extremity infection, w/u ongoing concerning for OM, podiatry following   - No vascular intervention at this point  - Podia following    - Monitor and replete lytes, keep K>4, Mg>2.  - Will continue to follow.    Tawny Antoine DNP, NP-C, AGACNP-C  Cardiology   Call TEAMS

## 2023-10-27 NOTE — PROGRESS NOTE ADULT - CONVERSATION DETAILS
Discussed with patient's son Hilario.    I explained to him that likely foot wound will worsen and I don't anticipate healing. Patient's son himself is dealing with cancer and patient with limited at home. It seems based on what Hilario described to me, patient himself would be inclined for less and more conservative intervention. He never really liked taking medications.    Given this I explained that patient qualifies for home hospice (severe dementia). Patient's son is interested in hearing more. Podiatry plans for intervention next week to optimize wound. Depending on what management is needed afterwards will dictate whether we can establish home hospice. Patient's son okay with temporarily rescinding DNR/I for procedure.

## 2023-10-27 NOTE — PROGRESS NOTE ADULT - ASSESSMENT
89-year-old male with PMHx of SCC, who presented with left lower extremity wound. Concern for infected LLE ulcer, and given appearance and depth of wound concern for deeper underlying infection. Xray showed no subcutaneous emphysema or gross osteomyelitis. Patient unable to tolerate MRI. so CT ordered for further evaluation.    WBC increased to 13 today alhtough remains afebrile and initial blood cultures remain no growth. Wound culture growing pseudomonas, sensitivities reviewed. Attempt at MRI was unsuccessful but will attempt CT if still unable to go through this. Underlying osteomyelitis possible due to extent of wound, but patient does not appear to be a good candidate for long term antibiotics as wound will probably not heal on antibiotics alone and also high risk of pulling out PICC. Son is against amputation, suggest further GOC discussion.    #LLE infected ulcer  #Leukocytosis    -continue Zosyn  -if unable to tolerate MRI consider CT left lower leg  -follow cultures to completion  -monitor WBC  -wound care  -discussed with Dr. Good  -I will be covered by Dr. Georges this weekend, 10/28-10/29/23    Su Azar MD  Division of Infectious Diseases   Cell [phone] between 8am and 6pm   After 6pm and weekends please call ID service at [phone].

## 2023-10-27 NOTE — PROGRESS NOTE ADULT - SUBJECTIVE AND OBJECTIVE BOX
Patient is a 89y old  Male who presents with a chief complaint of Left leg wound (27 Oct 2023 11:45)      SUBJECTIVE / OVERNIGHT EVENTS: Patient seen and examined at bedside. No acute events overnight. Discussed with son at bedside yesterday. He would be against aggressive operative management such as amputation. Otherwise would consider more conservative measures. Patient appears more calm and cooperative this morning.    MEDICATIONS  (STANDING):  enoxaparin Injectable 40 milliGRAM(s) SubCutaneous every 24 hours  piperacillin/tazobactam IVPB.- 3.375 Gram(s) IV Intermittent once  piperacillin/tazobactam IVPB.. 3.375 Gram(s) IV Intermittent every 8 hours  QUEtiapine 25 milliGRAM(s) Oral at bedtime  tamsulosin 0.4 milliGRAM(s) Oral at bedtime    MEDICATIONS  (PRN):  acetaminophen     Tablet .. 650 milliGRAM(s) Oral every 6 hours PRN Temp greater or equal to 38C (100.4F), Mild Pain (1 - 3)  OLANZapine Injectable 2.5 milliGRAM(s) IntraMuscular every 6 hours PRN agitation      CAPILLARY BLOOD GLUCOSE        I&O's Summary      PHYSICAL EXAM:  Vital Signs Last 24 Hrs  T(C): 36.5 (27 Oct 2023 11:49), Max: 37.1 (27 Oct 2023 06:31)  T(F): 97.7 (27 Oct 2023 11:49), Max: 98.7 (27 Oct 2023 06:31)  HR: 81 (27 Oct 2023 11:49) (78 - 98)  BP: 132/81 (27 Oct 2023 11:49) (130/80 - 169/114)  BP(mean): --  RR: 18 (27 Oct 2023 11:49) (18 - 19)  SpO2: 93% (27 Oct 2023 11:49) (93% - 98%)    Parameters below as of 27 Oct 2023 11:49  Patient On (Oxygen Delivery Method): room air        GEN: male in NAD, appears comfortable, no diaphoresis  EYES: No scleral injection, EOMI  ENTM: neck supple & symmetric without tracheal deviation, moist membranes, no gross hearing impairment, thyroid gland not enlarged  CV: +S1/S2, no m/r/g, no abdominal bruit, no LE edema  RESP: breathing comfortably, no respiratory accessory muscle use, CTAB, no w/r/r  GI: normoactive BS, soft, NTND, no rebounding/guarding, no palpable masses    LABS:                        12.1   13.24 )-----------( 290      ( 27 Oct 2023 10:50 )             37.5     10-27    144  |  108  |  19  ----------------------------<  121<H>  3.8   |  29  |  1.10    Ca    8.8      27 Oct 2023 10:50            Urinalysis Basic - ( 27 Oct 2023 10:50 )    Color: x / Appearance: x / SG: x / pH: x  Gluc: 121 mg/dL / Ketone: x  / Bili: x / Urobili: x   Blood: x / Protein: x / Nitrite: x   Leuk Esterase: x / RBC: x / WBC x   Sq Epi: x / Non Sq Epi: x / Bacteria: x          RADIOLOGY & ADDITIONAL TESTS:  Results Reviewed:   Imaging Personally Reviewed:  Electrocardiogram Personally Reviewed:    COORDINATION OF CARE:  Care Discussed with Consultants/Other Providers [Y/N]:  Prior or Outpatient Records Reviewed [Y/N]:

## 2023-10-27 NOTE — PROGRESS NOTE ADULT - SUBJECTIVE AND OBJECTIVE BOX
[facility] -- Genny Mora, Segun Kunz Savella, , Alessio Kimball  Office # [phone]    Follow Up:  Cardiac Arrhythmia    Subjective/Observations: Seen and evaluated, tolerating RA.  Confused and disoriented.  Not in any form of distress    REVIEW OF SYSTEMS: All other review of systems is negative unless indicated above  PAST MEDICAL & SURGICAL HISTORY:  No pertinent past medical history    H/O hernia repair    MEDICATIONS  (STANDING):  enoxaparin Injectable 40 milliGRAM(s) SubCutaneous every 24 hours  piperacillin/tazobactam IVPB.- 3.375 Gram(s) IV Intermittent once  piperacillin/tazobactam IVPB.. 3.375 Gram(s) IV Intermittent every 8 hours  QUEtiapine 25 milliGRAM(s) Oral at bedtime  tamsulosin 0.4 milliGRAM(s) Oral at bedtime    MEDICATIONS  (PRN):  acetaminophen     Tablet .. 650 milliGRAM(s) Oral every 6 hours PRN Temp greater or equal to 38C (100.4F), Mild Pain (1 - 3)  OLANZapine Injectable 2.5 milliGRAM(s) IntraMuscular every 6 hours PRN agitation    Allergies    No Known Allergies    Intolerances    Vital Signs Last 24 Hrs  T(C): 37.1 (27 Oct 2023 06:31), Max: 37.1 (27 Oct 2023 06:31)  T(F): 98.7 (27 Oct 2023 06:31), Max: 98.7 (27 Oct 2023 06:31)  HR: 78 (27 Oct 2023 06:31) (70 - 98)  BP: 130/80 (27 Oct 2023 06:31) (106/62 - 169/114)  BP(mean): --  RR: 19 (27 Oct 2023 06:31) (19 - 19)  SpO2: 98% (27 Oct 2023 06:31) (91% - 98%)    Parameters below as of 27 Oct 2023 06:31  Patient On (Oxygen Delivery Method): room air    I&O's Summary      PHYSICAL EXAM:  TELE: Not on tele  Constitutional: NAD, lethargic/confused, well-developed  HEENT: Dry Mucous Membranes, Anicteric  Pulmonary: Non-labored, breath sounds are clear but diminished bilaterally, No wheezing, rales or rhonchi  Cardiovascular: Regular, S1 and S2, No murmurs, rubs, gallops or clicks  Gastrointestinal: Bowel Sounds present, soft, nontender.   Lymph: No peripheral edema. No lymphadenopathy.  Skin: No visible rashes or ulcers.  Psych:  Mood & affect: Unable to assess  LABS: All Labs Reviewed:                        12.1   13.24 )-----------( 290      ( 27 Oct 2023 10:50 )             37.5                         11.6   9.64  )-----------( 294      ( 25 Oct 2023 06:48 )             36.1     25 Oct 2023 06:48    138    |  106    |  16     ----------------------------<  140    3.8     |  25     |  1.00     Ca    8.4        25 Oct 2023 06:48  Mg     1.9       25 Oct 2023 06:48    TPro  5.9    /  Alb  2.5    /  TBili  0.8    /  DBili  x      /  AST  31     /  ALT  23     /  AlkPhos  72     25 Oct 2023 06:48    12 Lead ECG:   Ventricular Rate 93 BPM    Atrial Rate 93 BPM    P-R Interval 184 ms    QRS Duration 112 ms    Q-T Interval 388 ms    QTC Calculation(Bazett) 482 ms    P Axis 65 degrees    R Axis -58 degrees    T Axis 67 degrees    Diagnosis Line Sinus rhythm withpremature supraventricular complexes and with occasional premature ventricular complexes  Left anterior fascicular block  Moderate voltage criteria for LVH, may be normal variant ( R in aVL , [place] product )  Possible Anterior infarct (cited on or before 25-OCT-2023)  Abnormal ECG  Confirmed by doug Harrell ([id number]) on 10/26/2023 3:27:22 PM (10-25-23 @ 14:00)

## 2023-10-27 NOTE — PROGRESS NOTE ADULT - SUBJECTIVE AND OBJECTIVE BOX
[facility]  INFECTIOUS DISEASES - [address], [place] [address]  Tel: [phone]     Fax: [phone]  =======================================================    ALICIA HERNANDEZ [id number]    Follow up: No fevers. Asleep but arousable. Denied any pain.    Allergies:  No Known Allergies      Antibiotics:  acetaminophen     Tablet .. 650 milliGRAM(s) Oral every 6 hours PRN  enoxaparin Injectable 40 milliGRAM(s) SubCutaneous every 24 hours  OLANZapine Injectable 2.5 milliGRAM(s) IntraMuscular every 6 hours PRN  piperacillin/tazobactam IVPB.- 3.375 Gram(s) IV Intermittent once  piperacillin/tazobactam IVPB.. 3.375 Gram(s) IV Intermittent every 8 hours  QUEtiapine 25 milliGRAM(s) Oral at bedtime  tamsulosin 0.4 milliGRAM(s) Oral at bedtime       REVIEW OF SYSTEMS:  Limited 2/2 mental status, as per HPI     Physical Exam:  ICU Vital Signs Last 24 Hrs  T(C): 36.5 (27 Oct 2023 11:49), Max: 37.1 (27 Oct 2023 06:31)  T(F): 97.7 (27 Oct 2023 11:49), Max: 98.7 (27 Oct 2023 06:31)  HR: 81 (27 Oct 2023 11:49) (78 - 98)  BP: 132/81 (27 Oct 2023 11:49) (130/80 - 169/114)  BP(mean): --  ABP: --  ABP(mean): --  RR: 18 (27 Oct 2023 11:49) (18 - 19)  SpO2: 93% (27 Oct 2023 11:49) (93% - 98%)    O2 Parameters below as of 27 Oct 2023 11:49  Patient On (Oxygen Delivery Method): room air      GEN: NAD  HEENT: normocephalic and atraumatic.   NECK: Supple.   LUNGS: Normal respiratory effort  HEART: Regular rate and rhythm   ABDOMEN: Soft, nontender, and nondistended.    EXTREMITIES: No leg edema.  NEUROLOGIC: Answering some simple questions  SKIN: (+) large ulceration on posterior L lower calf with necrotic tissue      Labs:  10-27    144  |  108  |  19  ----------------------------<  121<H>  3.8   |  29  |  1.10    Ca    8.8      27 Oct 2023 10:50                            12.1   13.24 )-----------( 290      ( 27 Oct 2023 10:50 )             37.5       Urinalysis Basic - ( 27 Oct 2023 10:50 )    Color: x / Appearance: x / SG: x / pH: x  Gluc: 121 mg/dL / Ketone: x  / Bili: x / Urobili: x   Blood: x / Protein: x / Nitrite: x   Leuk Esterase: x / RBC: x / WBC x   Sq Epi: x / Non Sq Epi: x / Bacteria: x          RECENT CULTURES:  10-24 @ 07:00 Wound Wound Pseudomonas aeruginosa    Few Pseudomonas aeruginosa        10-23 @ 21:50 .Blood Blood-Peripheral     No growth at 72 Hours              All imaging and data are reviewed.

## 2023-10-27 NOTE — PROGRESS NOTE ADULT - PROBLEM SELECTOR PLAN 1
LLE wound, significant drainage, circumferentially affected the ankle  - ID consult appreciated  - Wound culture with few pseudomonas  - Currently on Zosyn (vancomycin DC)  - elevated inflammatory markers noted  - MR L foot/ankle is pending, if unable to perform will perform CT  - Podiatry consult noted  - Left US: "Elevated velocities in the peroneal artery suggestive of a stenosis. No evidence of occlusion. Diffuse monophasic flow below the knee suggestive of underlying disease."  - Vascular consulted and no further intervention given comorbidities  - After imaging will discuss with podiatry, but likely only amenable to local wound care. Other interventions do not seem feasible and unlikely will offer prolonged benefit  - Given this and risk for future recurrent infections, would qualify for home hospice. Will discuss with son further

## 2023-10-27 NOTE — PROGRESS NOTE ADULT - PROBLEM SELECTOR PLAN 3
- Likely has component of dementia  - Zyprexa 2.5 mg IM q6 hours PRN agitation (QTc okay)  - palliative care also assessed - MOLST form updated - DNR and DNI  - Started Seroquel 25 mg qhs to maintain diurnal cycle  - Avoid benzodiazepines which have anti-cholinergic effects & can make delirium worse

## 2023-10-28 LAB
ANION GAP SERPL CALC-SCNC: 5 MMOL/L — SIGNIFICANT CHANGE UP (ref 5–17)
ANION GAP SERPL CALC-SCNC: 5 MMOL/L — SIGNIFICANT CHANGE UP (ref 5–17)
BUN SERPL-MCNC: 21 MG/DL — SIGNIFICANT CHANGE UP (ref 7–23)
BUN SERPL-MCNC: 21 MG/DL — SIGNIFICANT CHANGE UP (ref 7–23)
CALCIUM SERPL-MCNC: 8.6 MG/DL — SIGNIFICANT CHANGE UP (ref 8.5–10.1)
CALCIUM SERPL-MCNC: 8.6 MG/DL — SIGNIFICANT CHANGE UP (ref 8.5–10.1)
CHLORIDE SERPL-SCNC: 106 MMOL/L — SIGNIFICANT CHANGE UP (ref 96–108)
CHLORIDE SERPL-SCNC: 106 MMOL/L — SIGNIFICANT CHANGE UP (ref 96–108)
CO2 SERPL-SCNC: 31 MMOL/L — SIGNIFICANT CHANGE UP (ref 22–31)
CO2 SERPL-SCNC: 31 MMOL/L — SIGNIFICANT CHANGE UP (ref 22–31)
CREAT SERPL-MCNC: 1.1 MG/DL — SIGNIFICANT CHANGE UP (ref 0.5–1.3)
CREAT SERPL-MCNC: 1.1 MG/DL — SIGNIFICANT CHANGE UP (ref 0.5–1.3)
EGFR: 60 ML/MIN/1.73M2 — SIGNIFICANT CHANGE UP
EGFR: 60 ML/MIN/1.73M2 — SIGNIFICANT CHANGE UP
GLUCOSE SERPL-MCNC: 140 MG/DL — HIGH (ref 70–99)
GLUCOSE SERPL-MCNC: 140 MG/DL — HIGH (ref 70–99)
HCT VFR BLD CALC: 36.1 % — LOW (ref 39–50)
HCT VFR BLD CALC: 36.1 % — LOW (ref 39–50)
HGB BLD-MCNC: 11.8 G/DL — LOW (ref 13–17)
HGB BLD-MCNC: 11.8 G/DL — LOW (ref 13–17)
MCHC RBC-ENTMCNC: 27.9 PG — SIGNIFICANT CHANGE UP (ref 27–34)
MCHC RBC-ENTMCNC: 27.9 PG — SIGNIFICANT CHANGE UP (ref 27–34)
MCHC RBC-ENTMCNC: 32.7 GM/DL — SIGNIFICANT CHANGE UP (ref 32–36)
MCHC RBC-ENTMCNC: 32.7 GM/DL — SIGNIFICANT CHANGE UP (ref 32–36)
MCV RBC AUTO: 85.3 FL — SIGNIFICANT CHANGE UP (ref 80–100)
MCV RBC AUTO: 85.3 FL — SIGNIFICANT CHANGE UP (ref 80–100)
NRBC # BLD: 0 /100 WBCS — SIGNIFICANT CHANGE UP (ref 0–0)
NRBC # BLD: 0 /100 WBCS — SIGNIFICANT CHANGE UP (ref 0–0)
PLATELET # BLD AUTO: 306 K/UL — SIGNIFICANT CHANGE UP (ref 150–400)
PLATELET # BLD AUTO: 306 K/UL — SIGNIFICANT CHANGE UP (ref 150–400)
POTASSIUM SERPL-MCNC: 3.5 MMOL/L — SIGNIFICANT CHANGE UP (ref 3.5–5.3)
POTASSIUM SERPL-MCNC: 3.5 MMOL/L — SIGNIFICANT CHANGE UP (ref 3.5–5.3)
POTASSIUM SERPL-SCNC: 3.5 MMOL/L — SIGNIFICANT CHANGE UP (ref 3.5–5.3)
POTASSIUM SERPL-SCNC: 3.5 MMOL/L — SIGNIFICANT CHANGE UP (ref 3.5–5.3)
RBC # BLD: 4.23 M/UL — SIGNIFICANT CHANGE UP (ref 4.2–5.8)
RBC # BLD: 4.23 M/UL — SIGNIFICANT CHANGE UP (ref 4.2–5.8)
RBC # FLD: 13.7 % — SIGNIFICANT CHANGE UP (ref 10.3–14.5)
RBC # FLD: 13.7 % — SIGNIFICANT CHANGE UP (ref 10.3–14.5)
SODIUM SERPL-SCNC: 142 MMOL/L — SIGNIFICANT CHANGE UP (ref 135–145)
SODIUM SERPL-SCNC: 142 MMOL/L — SIGNIFICANT CHANGE UP (ref 135–145)
WBC # BLD: 12.68 K/UL — HIGH (ref 3.8–10.5)
WBC # BLD: 12.68 K/UL — HIGH (ref 3.8–10.5)
WBC # FLD AUTO: 12.68 K/UL — HIGH (ref 3.8–10.5)
WBC # FLD AUTO: 12.68 K/UL — HIGH (ref 3.8–10.5)

## 2023-10-28 PROCEDURE — 99232 SBSQ HOSP IP/OBS MODERATE 35: CPT

## 2023-10-28 PROCEDURE — 99233 SBSQ HOSP IP/OBS HIGH 50: CPT

## 2023-10-28 RX ADMIN — PIPERACILLIN AND TAZOBACTAM 25 GRAM(S): 4; .5 INJECTION, POWDER, LYOPHILIZED, FOR SOLUTION INTRAVENOUS at 05:18

## 2023-10-28 RX ADMIN — PIPERACILLIN AND TAZOBACTAM 25 GRAM(S): 4; .5 INJECTION, POWDER, LYOPHILIZED, FOR SOLUTION INTRAVENOUS at 14:18

## 2023-10-28 RX ADMIN — OLANZAPINE 2.5 MILLIGRAM(S): 15 TABLET, FILM COATED ORAL at 15:00

## 2023-10-28 RX ADMIN — QUETIAPINE FUMARATE 25 MILLIGRAM(S): 200 TABLET, FILM COATED ORAL at 22:20

## 2023-10-28 RX ADMIN — ENOXAPARIN SODIUM 40 MILLIGRAM(S): 100 INJECTION SUBCUTANEOUS at 05:15

## 2023-10-28 RX ADMIN — OLANZAPINE 2.5 MILLIGRAM(S): 15 TABLET, FILM COATED ORAL at 02:20

## 2023-10-28 RX ADMIN — TAMSULOSIN HYDROCHLORIDE 0.4 MILLIGRAM(S): 0.4 CAPSULE ORAL at 22:19

## 2023-10-28 RX ADMIN — PIPERACILLIN AND TAZOBACTAM 25 GRAM(S): 4; .5 INJECTION, POWDER, LYOPHILIZED, FOR SOLUTION INTRAVENOUS at 22:19

## 2023-10-28 NOTE — PROGRESS NOTE ADULT - SUBJECTIVE AND OBJECTIVE BOX
[facility] -- Morgan Mccarthy,  Joaquina Royal Patel, Savella, Goodger  Office # [phone]    Follow Up:  Cardiac Arrhythmia    Subjective/Observations: Pt seen and examined. NAD, unable to obtain meaningful ROS due to mental status.     REVIEW OF SYSTEMS: All other review of systems is negative unless indicated above  PAST MEDICAL & SURGICAL HISTORY:  No pertinent past medical history      H/O hernia repair        MEDICATIONS  (STANDING):  enoxaparin Injectable 40 milliGRAM(s) SubCutaneous every 24 hours  piperacillin/tazobactam IVPB.- 3.375 Gram(s) IV Intermittent once  piperacillin/tazobactam IVPB.. 3.375 Gram(s) IV Intermittent every 8 hours  QUEtiapine 25 milliGRAM(s) Oral at bedtime  tamsulosin 0.4 milliGRAM(s) Oral at bedtime    MEDICATIONS  (PRN):  acetaminophen     Tablet .. 650 milliGRAM(s) Oral every 6 hours PRN Temp greater or equal to 38C (100.4F), Mild Pain (1 - 3)  OLANZapine Injectable 2.5 milliGRAM(s) IntraMuscular every 6 hours PRN agitation    Allergies    No Known Allergies    Intolerances      Vital Signs Last 24 Hrs  T(C): 36.2 (28 Oct 2023 11:50), Max: 36.7 (28 Oct 2023 04:48)  T(F): 97.1 (28 Oct 2023 11:50), Max: 98.1 (28 Oct 2023 04:48)  HR: 96 (28 Oct 2023 11:50) (83 - 96)  BP: 111/68 (28 Oct 2023 11:50) (111/68 - 157/75)  BP(mean): --  RR: 18 (28 Oct 2023 11:50) (17 - 18)  SpO2: 95% (28 Oct 2023 11:50) (91% - 95%)    Parameters below as of 28 Oct 2023 11:50  Patient On (Oxygen Delivery Method): room air      I&O's Summary    27 Oct 2023 07:01  -  28 Oct 2023 07:00  --------------------------------------------------------  IN: 0 mL / OUT: 400 mL / NET: -400 mL      Tele: off  Physical exam  Constitutional: NAD, confuse  HEENT: Dry Mucous Membranes, Anicteric  Pulmonary: Non-labored, breath sounds are clear but diminished bilaterally, No wheezing, rales or rhonchi  Cardiovascular: Regular, S1 and S2, No murmurs, rubs, gallops or clicks  Gastrointestinal: Bowel Sounds present, soft, nontender.   Lymph: No peripheral edema. No lymphadenopathy.  Skin: No visible rashes or ulcers.  Psych:  Mood & affect: Unable to assess  LABS: All Labs Reviewed:                        11.8   12.68 )-----------( 306      ( 28 Oct 2023 07:50 )             36.1                         12.1   13.24 )-----------( 290      ( 27 Oct 2023 10:50 )             37.5     28 Oct 2023 07:50    142    |  106    |  21     ----------------------------<  140    3.5     |  31     |  1.10   27 Oct 2023 10:50    144    |  108    |  19     ----------------------------<  121    3.8     |  29     |  1.10     Ca    8.6        28 Oct 2023 07:50  Ca    8.8        27 Oct 2023 10:50            12 Lead ECG:   Ventricular Rate 93 BPM    Atrial Rate 93 BPM    P-R Interval 184 ms    QRS Duration 112 ms    Q-T Interval 388 ms    QTC Calculation(Bazett) 482 ms    P Axis 65 degrees    R Axis -58 degrees    T Axis 67 degrees    Diagnosis Line Sinus rhythm withpremature supraventricular complexes and with occasional premature ventricular complexes  Left anterior fascicular block  Moderate voltage criteria for LVH, may be normal variant ( R in aVL , [employer]umberto product )  Possible Anterior infarct (cited on or before 25-OCT-2023)  Abnormal ECG  Confirmed by doug Harrell ([id number]) on 10/26/2023 3:27:22 PM (10-25-23 @ 14:00)

## 2023-10-28 NOTE — PROGRESS NOTE ADULT - PROBLEM SELECTOR PLAN 1
LLE wound, significant drainage, circumferentially affected the ankle  - ID consult appreciated  - Wound culture with few pseudomonas  - Currently on Zosyn (vancomycin DC)  - elevated inflammatory markers noted  - Podiatry consult noted  - Left US: "Elevated velocities in the peroneal artery suggestive of a stenosis. No evidence of occlusion. Diffuse monophasic flow below the knee suggestive of underlying disease."  - Vascular consulted and no further intervention given comorbidities  - MRI left foot/ankle appreciated: possible osteo. Discussed with podiatry and plan for surgical intervention on 10/31. Son agreeable to temporarily rescinding DNR/I  - Given this and risk for future recurrent infections, would qualify for home hospice. Will discuss with son further

## 2023-10-28 NOTE — PROGRESS NOTE ADULT - SUBJECTIVE AND OBJECTIVE BOX
Patient is a 89y old  Male who presents with a chief complaint of Left leg wound (28 Oct 2023 13:09)      SUBJECTIVE / OVERNIGHT EVENTS: Patient seen and examined at bedside. No acute events overnight. Calm. Denies pain.    MEDICATIONS  (STANDING):  enoxaparin Injectable 40 milliGRAM(s) SubCutaneous every 24 hours  piperacillin/tazobactam IVPB.- 3.375 Gram(s) IV Intermittent once  piperacillin/tazobactam IVPB.. 3.375 Gram(s) IV Intermittent every 8 hours  QUEtiapine 25 milliGRAM(s) Oral at bedtime  tamsulosin 0.4 milliGRAM(s) Oral at bedtime    MEDICATIONS  (PRN):  acetaminophen     Tablet .. 650 milliGRAM(s) Oral every 6 hours PRN Temp greater or equal to 38C (100.4F), Mild Pain (1 - 3)  OLANZapine Injectable 2.5 milliGRAM(s) IntraMuscular every 6 hours PRN agitation      CAPILLARY BLOOD GLUCOSE        I&O's Summary    27 Oct 2023 07:01  -  28 Oct 2023 07:00  --------------------------------------------------------  IN: 0 mL / OUT: 400 mL / NET: -400 mL        PHYSICAL EXAM:  Vital Signs Last 24 Hrs  T(C): 36.2 (28 Oct 2023 11:50), Max: 36.7 (28 Oct 2023 04:48)  T(F): 97.1 (28 Oct 2023 11:50), Max: 98.1 (28 Oct 2023 04:48)  HR: 96 (28 Oct 2023 11:50) (83 - 96)  BP: 111/68 (28 Oct 2023 11:50) (111/68 - 157/75)  BP(mean): --  RR: 18 (28 Oct 2023 11:50) (17 - 18)  SpO2: 95% (28 Oct 2023 11:50) (91% - 95%)    Parameters below as of 28 Oct 2023 11:50  Patient On (Oxygen Delivery Method): room air        GEN: male in NAD, appears comfortable, no diaphoresis  EYES: No scleral injection, EOMI  ENTM: neck supple & symmetric without tracheal deviation, moist membranes, no gross hearing impairment, thyroid gland not enlarged  CV: +S1/S2, no m/r/g, no abdominal bruit, no LE edema  RESP: breathing comfortably, no respiratory accessory muscle use, CTAB, no w/r/r  GI: normoactive BS, soft, NTND, no rebounding/guarding, no palpable masses    LABS:                        11.8   12.68 )-----------( 306      ( 28 Oct 2023 07:50 )             36.1     10-28    142  |  106  |  21  ----------------------------<  140<H>  3.5   |  31  |  1.10    Ca    8.6      28 Oct 2023 07:50            Urinalysis Basic - ( 28 Oct 2023 07:50 )    Color: x / Appearance: x / SG: x / pH: x  Gluc: 140 mg/dL / Ketone: x  / Bili: x / Urobili: x   Blood: x / Protein: x / Nitrite: x   Leuk Esterase: x / RBC: x / WBC x   Sq Epi: x / Non Sq Epi: x / Bacteria: x          RADIOLOGY & ADDITIONAL TESTS:  Results Reviewed:   Imaging Personally Reviewed:  Electrocardiogram Personally Reviewed:    COORDINATION OF CARE:  Care Discussed with Consultants/Other Providers [Y/N]:  Prior or Outpatient Records Reviewed [Y/N]:

## 2023-10-28 NOTE — PROGRESS NOTE ADULT - PROBLEM SELECTOR PLAN 7
Detail Level: Detailed Quality 110: Preventive Care And Screening: Influenza Immunization: Influenza Immunization Administered during Influenza season Lovenox 40mg qd

## 2023-10-28 NOTE — PROGRESS NOTE ADULT - SUBJECTIVE AND OBJECTIVE BOX
[facility]   INFECTIOUS DISEASES   [address] [place] [address]  Tel: [phone]     Fax: [phone]  =========================================================  MD Raphael Mena Kaushal, MD Cho, Michelle, MD Sunjit, Jaspal, MD  =========================================================    ALICIA HERNANDEZ [id number]    Follow up: Awake and alert but not oriented, no fever, no complaint.     Allergies:  No Known Allergies    Antibiotics:  acetaminophen     Tablet .. 650 milliGRAM(s) Oral every 6 hours PRN  enoxaparin Injectable 40 milliGRAM(s) SubCutaneous every 24 hours  OLANZapine Injectable 2.5 milliGRAM(s) IntraMuscular every 6 hours PRN  piperacillin/tazobactam IVPB.- 3.375 Gram(s) IV Intermittent once  piperacillin/tazobactam IVPB.. 3.375 Gram(s) IV Intermittent every 8 hours  QUEtiapine 25 milliGRAM(s) Oral at bedtime  tamsulosin 0.4 milliGRAM(s) Oral at bedtime     REVIEW OF SYSTEMS:  CONSTITUTIONAL:  No Fever or chills  HEENT:   No diplopia or blurred vision.  No earache, sore throat or runny nose.  CARDIOVASCULAR:  No chest pain or SOB  RESPIRATORY:  No cough, shortness of breath, PND or orthopnea.  GASTROINTESTINAL:  No nausea, vomiting or diarrhea.  GENITOURINARY:  No dysuria, frequency or urgency. No Blood in urine  MUSCULOSKELETAL:  no joint aches, no muscle pain  SKIN:  No change in skin, hair or nails.  NEUROLOGIC:  No paresthesias or weakness.  PSYCHIATRIC:  No disorder of thought or mood.  ENDOCRINE:  No heat or cold intolerance, polyuria or polydipsia.  HEMATOLOGICAL:  No easy bruising or bleeding.      Physical Exam:  ICU Vital Signs Last 24 Hrs  T(C): 36.2 (28 Oct 2023 11:50), Max: 36.7 (28 Oct 2023 04:48)  T(F): 97.1 (28 Oct 2023 11:50), Max: 98.1 (28 Oct 2023 04:48)  HR: 96 (28 Oct 2023 11:50) (83 - 96)  BP: 111/68 (28 Oct 2023 11:50) (111/68 - 157/75)  RR: 18 (28 Oct 2023 11:50) (17 - 18)  SpO2: 95% (28 Oct 2023 11:50) (91% - 95%)  O2 Parameters below as of 28 Oct 2023 11:50  Patient On (Oxygen Delivery Method): room air  GEN: NAD  HEENT: normocephalic and atraumatic.   NECK: Supple.   LUNGS: Normal respiratory effort  HEART: Regular rate and rhythm   ABDOMEN: Soft, nontender, and nondistended.    EXTREMITIES: No leg edema.  NEUROLOGIC: Answering some simple questions  SKIN: (+) large ulceration on posterior L lower calf with necrotic tissue (now dressed)      Labs:  10-28    142  |  106  |  21  ----------------------------<  140<H>  3.5   |  31  |  1.10    Ca    8.6      28 Oct 2023 07:50                        11.8   12.68 )-----------( 306      ( 28 Oct 2023 07:50 )             36.1     Urinalysis Basic - ( 28 Oct 2023 07:50 )    Color: x / Appearance: x / SG: x / pH: x  Gluc: 140 mg/dL / Ketone: x  / Bili: x / Urobili: x   Blood: x / Protein: x / Nitrite: x   Leuk Esterase: x / RBC: x / WBC x   Sq Epi: x / Non Sq Epi: x / Bacteria: x    RECENT CULTURES:  10-24 @ 07:00 Wound Wound Pseudomonas aeruginosa    Few Pseudomonas aeruginosa    10-23 @ 21:50 .Blood Blood-Peripheral     No growth at 4 days      All imaging and data are reviewed.   < from: MR Foot w/wo IV Cont, Left (10.27.23 @ 15:01) >    IMPRESSION: Left foot: Cutaneous wound along the medial aspect of the   hallux at the level of the IP joint with adjacent cellulitis. Adjacent   signal alteration in the base of the hallux distal phalanx and within the   head of the hallux proximal phalanx, suspicious for associated   osteomyelitis.  Left ankle: Large cutaneous wound along the posterior aspect of the ankle   with an associated defect in the Achilles tendon, as above. No   osteomyelitis.    Assessment and Plan:   89-year-old male with PMHx of SCC, who presented with left lower extremity wound. Concern for infected LLE ulcer, and given appearance and depth of wound concern for deeper underlying infection. Xray showed no subcutaneous emphysema or gross osteomyelitis. Patient unable to tolerate MRI. so CT ordered for further evaluation.    WBC is 12 today, afebrile and nontoxic looking. Blood cultures negative. Would culture growing pseudomonas, sensitivities reviewed. MRI with OM. As per GOC can decide about treatment plan.     #LLE infected ulcer  #Leukocytosis    - Continue Zosyn for now, can switch to cefepime if plan for discharge with IV  - Follow cultures to completion  - Monitor Tmax and WBC   - Wound care    Will follow.     Marques Georges MD  Division of Infectious Diseases   Please call ID service at [phone] with any question.      50 minutes spent on total encounter assessing patient, examination, chart review, counseling and coordinating care by the attending physician/nurse/care manager.

## 2023-10-28 NOTE — PROGRESS NOTE ADULT - ASSESSMENT
89-year-old male with PMHx of SCC, HTN , spinal stenosis presents to the emergency department with son with report of left lower extremity infection concern for OM now with concern for afib on his EKG.     Abnormal EKG, possible Afib,, cardiac optimization   - EKG: irregular rhythm, was felt to be possible Afib vs SR with mobitz type I and PACs  -has seemed to be more convincingly sr on more recent review  - New onset, unknown duration, per son (bedside), they were told about skipped beats, not Afib.  Not on AC or AVN blockers  - No sign of acute ischemia  - [id number] score: 3 (age, hx htn)   - Given advanced age, ? compliance with meds, hx of delirium, on enhanced observation for safety, he is poor candidate for long term AC, risks outweighs benefits    - No sign of volume overload, non orthopneic, no O2 requirement   - Off home Losartan. bp controlled     - Pt for MRI to r/o osteo. Possible OR for a debridement afterwards  - Left lower extremity infection, w/u ongoing concerning for OM, podiatry following   - No vascular intervention at this point  - Podia following    - Monitor and replete lytes, keep K>4, Mg>2.  - Will continue to follow.    Kassidy Antonio NP  Nurse Practitioner- Cardiology   Call TEAMS

## 2023-10-29 LAB
CULTURE RESULTS: SIGNIFICANT CHANGE UP
SPECIMEN SOURCE: SIGNIFICANT CHANGE UP

## 2023-10-29 PROCEDURE — 99232 SBSQ HOSP IP/OBS MODERATE 35: CPT

## 2023-10-29 PROCEDURE — 99233 SBSQ HOSP IP/OBS HIGH 50: CPT

## 2023-10-29 RX ADMIN — QUETIAPINE FUMARATE 25 MILLIGRAM(S): 200 TABLET, FILM COATED ORAL at 22:44

## 2023-10-29 RX ADMIN — PIPERACILLIN AND TAZOBACTAM 25 GRAM(S): 4; .5 INJECTION, POWDER, LYOPHILIZED, FOR SOLUTION INTRAVENOUS at 05:34

## 2023-10-29 RX ADMIN — PIPERACILLIN AND TAZOBACTAM 25 GRAM(S): 4; .5 INJECTION, POWDER, LYOPHILIZED, FOR SOLUTION INTRAVENOUS at 22:43

## 2023-10-29 RX ADMIN — PIPERACILLIN AND TAZOBACTAM 25 GRAM(S): 4; .5 INJECTION, POWDER, LYOPHILIZED, FOR SOLUTION INTRAVENOUS at 14:06

## 2023-10-29 RX ADMIN — ENOXAPARIN SODIUM 40 MILLIGRAM(S): 100 INJECTION SUBCUTANEOUS at 05:35

## 2023-10-29 RX ADMIN — TAMSULOSIN HYDROCHLORIDE 0.4 MILLIGRAM(S): 0.4 CAPSULE ORAL at 22:44

## 2023-10-29 NOTE — PROGRESS NOTE ADULT - SUBJECTIVE AND OBJECTIVE BOX
[facility] -- Genny Mora Pannella, Patel, Savella Goodger, Cohen  Office # [phone]      Follow Up:    Cardiac arrhythmia   Subjective/Observations: Seen bedside, unable to provide any meaningful information   laying flat on room air       REVIEW OF SYSTEMS: All other review of systems is negative unless indicated above    PAST MEDICAL & SURGICAL HISTORY:  No pertinent past medical history      H/O hernia repair          MEDICATIONS  (STANDING):  enoxaparin Injectable 40 milliGRAM(s) SubCutaneous every 24 hours  piperacillin/tazobactam IVPB.- 3.375 Gram(s) IV Intermittent once  piperacillin/tazobactam IVPB.. 3.375 Gram(s) IV Intermittent every 8 hours  QUEtiapine 25 milliGRAM(s) Oral at bedtime  tamsulosin 0.4 milliGRAM(s) Oral at bedtime    MEDICATIONS  (PRN):  acetaminophen     Tablet .. 650 milliGRAM(s) Oral every 6 hours PRN Temp greater or equal to 38C (100.4F), Mild Pain (1 - 3)  OLANZapine Injectable 2.5 milliGRAM(s) IntraMuscular every 6 hours PRN agitation      Allergies    No Known Allergies    Intolerances        Vital Signs Last 24 Hrs  T(C): 36.9 (29 Oct 2023 05:29), Max: 37.6 (28 Oct 2023 20:34)  T(F): 98.5 (29 Oct 2023 05:29), Max: 99.7 (28 Oct 2023 20:34)  HR: 98 (29 Oct 2023 05:29) (73 - 98)  BP: 151/72 (29 Oct 2023 05:29) (111/68 - 151/72)  BP(mean): --  RR: 17 (29 Oct 2023 05:29) (17 - 18)  SpO2: 93% (29 Oct 2023 05:29) (91% - 95%)    Parameters below as of 29 Oct 2023 05:29  Patient On (Oxygen Delivery Method): room air        I&O's Summary    28 Oct 2023 07:01  -  29 Oct 2023 07:00  --------------------------------------------------------  IN: 175 mL / OUT: 650 mL / NET: -475 mL          PHYSICAL EXAM:  TELE: Not on tele   Constitutional: NAD,  well-developed  HEENT: Moist Mucous Membranes, Anicteric  Pulmonary: Non-labored, breath sounds are Diminished   Cardiovascular: Regular, S1 and S2 nl, No murmurs, rubs, gallops or clicks  Gastrointestinal: Bowel Sounds present, soft, nontender.   Lymph: No lymphadenopathy. No peripheral edema.  Skin: No visible rashes or ulcers.    LABS: All Labs Reviewed:                        11.8   12.68 )-----------( 306      ( 28 Oct 2023 07:50 )             36.1                         12.1   13.24 )-----------( 290      ( 27 Oct 2023 10:50 )             37.5     28 Oct 2023 07:50    142    |  106    |  21     ----------------------------<  140    3.5     |  31     |  1.10   27 Oct 2023 10:50    144    |  108    |  19     ----------------------------<  121    3.8     |  29     |  1.10     Ca    8.6        28 Oct 2023 07:50  Ca    8.8        27 Oct 2023 10:50               EC Lead ECG:   Ventricular Rate 93 BPM    Atrial Rate 93 BPM    P-R Interval 184 ms    QRS Duration 112 ms    Q-T Interval 388 ms    QTC Calculation(Bazett) 482 ms    P Axis 65 degrees    R Axis -58 degrees    T Axis 67 degrees    Diagnosis Line Sinus rhythm withpremature supraventricular complexes and with occasional premature ventricular complexes  Left anterior fascicular block  Moderate voltage criteria for LVH, may be normal variant ( R in aVL , [employer] product )  Possible Anterior infarct (cited on or before 25-OCT-2023)  Abnormal ECG  Confirmed by doug Harrell ([id number]) on 10/26/2023 3:27:22 PM (10-25-23 @ 14:00)        Radiology:

## 2023-10-29 NOTE — PROGRESS NOTE ADULT - ASSESSMENT
89-year-old male with PMHx of SCC, HTN , spinal stenosis presents to the emergency department with son with report of left lower extremity infection concern for OM now with concern for afib on his EKG.     Abnormal EKG, possible Afib,, cardiac optimization   - EKG: irregular rhythm, was felt to be possible Afib vs SR with mobitz type I and PACs  -has seemed to be more convincingly sr on more recent review  - No sign of acute ischemia  - Given advanced age, ? compliance with meds, hx of delirium, on enhanced observation for safety, he is poor candidate for long term AC, risks outweighs benefits    - No sign of volume overload, non orthopneic, no O2 requirement laying flat    - Can resume home Losartan     - Vascular consulted and no further intervention given comorbidities  - MRI left foot/ankle appreciated: possible osteo. Discussed with podiatry and plan for surgical intervention on 10/31. Son agreeable to temporarily rescinding DNR/I    - Monitor and replete lytes, keep K>4, Mg>2.  - Will continue to follow. 89-year-old male with PMHx of SCC, HTN , spinal stenosis presents to the emergency department with son with report of left lower extremity infection concern for OM now with concern for afib on his EKG.     Abnormal EKG, possible Afib,, cardiac optimization   - EKG: irregular rhythm, was felt to be possible Afib vs SR with mobitz type I and PACs  - has seemed to be more convincingly sr on more recent review  - No sign of acute ischemia  - Given advanced age, ? compliance with meds, hx of delirium, on enhanced observation for safety, he is poor candidate for long term AC, risks outweighs benefits    - No sign of volume overload, non orthopneic, no O2 requirement laying flat    - Can resume home Losartan     - Vascular consulted and no further intervention given comorbidities  - MRI left foot/ankle appreciated: possible osteo. Discussed with podiatry and plan for surgical intervention on 10/31. Son agreeable to temporarily rescinding DNR/I    - Monitor and replete lytes, keep K>4, Mg>2.  - Will continue to follow.

## 2023-10-29 NOTE — PROGRESS NOTE ADULT - SUBJECTIVE AND OBJECTIVE BOX
[facility]   INFECTIOUS DISEASES   [address] [place] [address]  Tel: [phone]     Fax: [phone]  =========================================================  MD Raphael Mena Kaushal, MD Cho, Michelle, MD Sunjit, Jaspal, MD  =========================================================    ALICIA HERNANDEZ [id number]    Follow up: Awake and alert but not oriented, no fever.    Allergies:  No Known Allergies    MEDICATIONS  (STANDING):  enoxaparin Injectable 40 milliGRAM(s) SubCutaneous every 24 hours  piperacillin/tazobactam IVPB.- 3.375 Gram(s) IV Intermittent once  piperacillin/tazobactam IVPB.. 3.375 Gram(s) IV Intermittent every 8 hours  QUEtiapine 25 milliGRAM(s) Oral at bedtime  tamsulosin 0.4 milliGRAM(s) Oral at bedtime    REVIEW OF SYSTEMS:  CONSTITUTIONAL:  No Fever or chills  HEENT:   No diplopia or blurred vision.  No earache, sore throat or runny nose.  CARDIOVASCULAR:  No chest pain or SOB  RESPIRATORY:  No cough, shortness of breath, PND or orthopnea.  GASTROINTESTINAL:  No nausea, vomiting or diarrhea.  GENITOURINARY:  No dysuria, frequency or urgency. No Blood in urine  MUSCULOSKELETAL:  no joint aches, no muscle pain  SKIN:  No change in skin, hair or nails.  NEUROLOGIC:  No paresthesias or weakness.  PSYCHIATRIC:  No disorder of thought or mood.  ENDOCRINE:  No heat or cold intolerance, polyuria or polydipsia.  HEMATOLOGICAL:  No easy bruising or bleeding.      Physical Exam:  Vital Signs Last 24 Hrs  T(C): 36.3 (29 Oct 2023 11:46), Max: 37.6 (28 Oct 2023 20:34)  T(F): 97.4 (29 Oct 2023 11:46), Max: 99.7 (28 Oct 2023 20:34)  HR: 91 (29 Oct 2023 11:46) (73 - 98)  BP: 154/77 (29 Oct 2023 11:46) (146/71 - 154/77)  BP(mean): --  RR: 18 (29 Oct 2023 11:46) (17 - 18)  SpO2: 98% (29 Oct 2023 11:46) (91% - 98%)  Parameters below as of 29 Oct 2023 11:46  Patient On (Oxygen Delivery Method): room air  GEN: NAD  HEENT: normocephalic and atraumatic.   NECK: Supple.   LUNGS: Normal respiratory effort  HEART: Regular rate and rhythm   ABDOMEN: Soft, nontender, and nondistended.    EXTREMITIES: No leg edema.  NEUROLOGIC: Answering some simple questions  SKIN: (+) large ulceration on posterior L lower calf with necrotic tissue (now dressed)      Labs:                        11.8   12.68 )-----------( 306      ( 28 Oct 2023 07:50 )             36.1     10-28    142  |  106  |  21  ----------------------------<  140<H>  3.5   |  31  |  1.10    Ca    8.6      28 Oct 2023 07:50    Culture - Other (collected 10-24-23 @ 07:00)  Source: Wound Wound  Final Report (10-26-23 @ 17:14):    Few Pseudomonas aeruginosa  Organism: Pseudomonas aeruginosa (10-26-23 @ 17:14)  Organism: Pseudomonas aeruginosa (10-26-23 @ 17:14)    Sensitivities:      Method Type: KIM      -  Amikacin: S <=16      -  Aztreonam: S <=4      -  Cefepime: S <=2      -  Ceftazidime: S 4      -  Ciprofloxacin: S <=0.25      -  Gentamicin: S 4      -  Imipenem: S 2      -  Levofloxacin: S <=0.5      -  Meropenem: S <=1      -  Piperacillin/Tazobactam: S <=8      -  Tobramycin: S <=2    Culture - Blood (collected 10-23-23 @ 21:50)  Source: .Blood Blood-Peripheral  Final Report (10-29-23 @ 01:01):    No growth at 5 days    Culture - Blood (collected 10-23-23 @ 21:50)  Source: .Blood Blood-Peripheral  Final Report (10-29-23 @ 01:01):    No growth at 5 days      WBC Count: 12.68 K/uL (10-28-23 @ 07:50)  WBC Count: 13.24 K/uL (10-27-23 @ 10:50)  WBC Count: 9.64 K/uL (10-25-23 @ 06:48)    Creatinine: 1.10 mg/dL (10-28-23 @ 07:50)  Creatinine: 1.10 mg/dL (10-27-23 @ 10:50)  Creatinine: 1.00 mg/dL (10-25-23 @ 06:48)    C-Reactive Protein, Serum: 41 mg/L (10-23-23 @ 21:50)    Sedimentation Rate, Erythrocyte: 23 mm/hr (10-23-23 @ 21:50)    Procalcitonin, Serum: 0.05 ng/mL (10-25-23 @ 06:48)     All imaging and data are reviewed.   < from: MR Foot w/wo IV Cont, Left (10.27.23 @ 15:01) >    IMPRESSION: Left foot: Cutaneous wound along the medial aspect of the   hallux at the level of the IP joint with adjacent cellulitis. Adjacent   signal alteration in the base of the hallux distal phalanx and within the   head of the hallux proximal phalanx, suspicious for associated   osteomyelitis.  Left ankle: Large cutaneous wound along the posterior aspect of the ankle   with an associated defect in the Achilles tendon, as above. No   osteomyelitis.    Assessment and Plan:   89-year-old male with PMHx of SCC, who presented with left lower extremity wound. Concern for infected LLE ulcer, and given appearance and depth of wound concern for deeper underlying infection. Xray showed no subcutaneous emphysema or gross osteomyelitis. Patient unable to tolerate MRI. so CT ordered for further evaluation.    Last WBC is 12, afebrile and nontoxic looking. Blood cultures negative. Would culture growing pseudomonas, sensitivities reviewed. MRI with OM. As per GOC can decide about treatment plan.     #LLE infected ulcer  #Leukocytosis    - Continue Zosyn   - Follow cultures to completion  - Monitor Tmax and WBC   - Wound care    Dr. Azar will assume care tomorrow.     Marques Georges MD  Division of Infectious Diseases   Please call ID service at [phone] with any question.      50 minutes spent on total encounter assessing patient, examination, chart review, counseling and coordinating care by the attending physician/nurse/care manager.

## 2023-10-29 NOTE — PROGRESS NOTE ADULT - PROBLEM SELECTOR PLAN 1
LLE wound, significant drainage, circumferentially affected the ankle  - ID consult appreciated  - Wound culture with few pseudomonas which is pansensitive  - Currently on Zosyn (vancomycin DC)  - elevated inflammatory markers noted  - Podiatry consult noted  - Left US: "Elevated velocities in the peroneal artery suggestive of a stenosis. No evidence of occlusion. Diffuse monophasic flow below the knee suggestive of underlying disease."  - Vascular consulted and no further intervention given comorbidities  - MRI left foot/ankle appreciated: possible osteo. Discussed with podiatry and plan for surgical intervention on 10/31. Son agreeable to temporarily rescinding DNR/I  - Given this and risk for future recurrent infections, would qualify for home hospice. Will discuss with son further

## 2023-10-29 NOTE — PHYSICAL THERAPY INITIAL EVALUATION ADULT - REHAB POTENTIAL, PT EVAL
Quality 130: Documentation Of Current Medications In The Medical Record: Current Medications Documented Quality 110: Preventive Care And Screening: Influenza Immunization: Influenza Immunization Administered during Influenza season Quality 111:Pneumonia Vaccination Status For Older Adults: Patient received any pneumococcal conjugate or polysaccharide vaccine on or after their 60th birthday and before the end of the measurement period Detail Level: Detailed good, to achieve stated therapy goals

## 2023-10-29 NOTE — PHYSICAL THERAPY INITIAL EVALUATION ADULT - PERTINENT HX OF CURRENT PROBLEM, REHAB EVAL
89-year-old male with no significant past medical history presents to the emergency department with son with report of left lower extremity infection.  Patient states that he has had a wound in his left lower leg over the summer, some states that he took him to [facility] emergency department on 8/28/2023, was given a prescription for doxycycline but never took it, patient then returned again to the emergency department 10/4/2023 at which point given the prescription for clindamycin in which she only took 3-4 doses, son states that the last few days has been increased drainage from the wound reported to scented to the emergency department today.

## 2023-10-29 NOTE — PROGRESS NOTE ADULT - SUBJECTIVE AND OBJECTIVE BOX
Patient is a 89y old  Male who presents with a chief complaint of Left leg wound (29 Oct 2023 07:30)      SUBJECTIVE / OVERNIGHT EVENTS: Patient seen and examined at bedside. No acute events overnight. Feels well, received Zyprexa 2.5 x2 yesterday. No pain in his left foot    MEDICATIONS  (STANDING):  enoxaparin Injectable 40 milliGRAM(s) SubCutaneous every 24 hours  piperacillin/tazobactam IVPB.- 3.375 Gram(s) IV Intermittent once  piperacillin/tazobactam IVPB.. 3.375 Gram(s) IV Intermittent every 8 hours  QUEtiapine 25 milliGRAM(s) Oral at bedtime  tamsulosin 0.4 milliGRAM(s) Oral at bedtime    MEDICATIONS  (PRN):  acetaminophen     Tablet .. 650 milliGRAM(s) Oral every 6 hours PRN Temp greater or equal to 38C (100.4F), Mild Pain (1 - 3)  OLANZapine Injectable 2.5 milliGRAM(s) IntraMuscular every 6 hours PRN agitation      CAPILLARY BLOOD GLUCOSE        I&O's Summary    28 Oct 2023 07:01  -  29 Oct 2023 07:00  --------------------------------------------------------  IN: 175 mL / OUT: 650 mL / NET: -475 mL        PHYSICAL EXAM:  Vital Signs Last 24 Hrs  T(C): 36.9 (29 Oct 2023 05:29), Max: 37.6 (28 Oct 2023 20:34)  T(F): 98.5 (29 Oct 2023 05:29), Max: 99.7 (28 Oct 2023 20:34)  HR: 98 (29 Oct 2023 05:29) (73 - 98)  BP: 151/72 (29 Oct 2023 05:29) (111/68 - 151/72)  BP(mean): --  RR: 17 (29 Oct 2023 05:29) (17 - 18)  SpO2: 93% (29 Oct 2023 05:29) (91% - 95%)    Parameters below as of 29 Oct 2023 05:29  Patient On (Oxygen Delivery Method): room air        GEN: male in NAD, appears comfortable, no diaphoresis  EYES: No scleral injection, EOMI  ENTM: neck supple & symmetric without tracheal deviation, moist membranes, no gross hearing impairment, thyroid gland not enlarged  CV: +S1/S2, no m/r/g, no abdominal bruit, no LE edema  RESP: breathing comfortably, no respiratory accessory muscle use, CTAB, no w/r/r  GI: normoactive BS, soft, NTND, no rebounding/guarding, no palpable masses    LABS:                        11.8   12.68 )-----------( 306      ( 28 Oct 2023 07:50 )             36.1     10-28    142  |  106  |  21  ----------------------------<  140<H>  3.5   |  31  |  1.10    Ca    8.6      28 Oct 2023 07:50            Urinalysis Basic - ( 28 Oct 2023 07:50 )    Color: x / Appearance: x / SG: x / pH: x  Gluc: 140 mg/dL / Ketone: x  / Bili: x / Urobili: x   Blood: x / Protein: x / Nitrite: x   Leuk Esterase: x / RBC: x / WBC x   Sq Epi: x / Non Sq Epi: x / Bacteria: x          RADIOLOGY & ADDITIONAL TESTS:  Results Reviewed:   Imaging Personally Reviewed:  Electrocardiogram Personally Reviewed:    COORDINATION OF CARE:  Care Discussed with Consultants/Other Providers [Y/N]:  Prior or Outpatient Records Reviewed [Y/N]:

## 2023-10-30 ENCOUNTER — TRANSCRIPTION ENCOUNTER (OUTPATIENT)
Age: 88
End: 2023-10-30

## 2023-10-30 PROCEDURE — 99232 SBSQ HOSP IP/OBS MODERATE 35: CPT

## 2023-10-30 PROCEDURE — 99233 SBSQ HOSP IP/OBS HIGH 50: CPT

## 2023-10-30 RX ADMIN — Medication 650 MILLIGRAM(S): at 09:50

## 2023-10-30 RX ADMIN — QUETIAPINE FUMARATE 25 MILLIGRAM(S): 200 TABLET, FILM COATED ORAL at 21:20

## 2023-10-30 RX ADMIN — Medication 650 MILLIGRAM(S): at 08:50

## 2023-10-30 RX ADMIN — TAMSULOSIN HYDROCHLORIDE 0.4 MILLIGRAM(S): 0.4 CAPSULE ORAL at 21:19

## 2023-10-30 RX ADMIN — ENOXAPARIN SODIUM 40 MILLIGRAM(S): 100 INJECTION SUBCUTANEOUS at 05:27

## 2023-10-30 RX ADMIN — PIPERACILLIN AND TAZOBACTAM 25 GRAM(S): 4; .5 INJECTION, POWDER, LYOPHILIZED, FOR SOLUTION INTRAVENOUS at 05:27

## 2023-10-30 RX ADMIN — PIPERACILLIN AND TAZOBACTAM 25 GRAM(S): 4; .5 INJECTION, POWDER, LYOPHILIZED, FOR SOLUTION INTRAVENOUS at 14:10

## 2023-10-30 RX ADMIN — PIPERACILLIN AND TAZOBACTAM 25 GRAM(S): 4; .5 INJECTION, POWDER, LYOPHILIZED, FOR SOLUTION INTRAVENOUS at 21:19

## 2023-10-30 NOTE — PROGRESS NOTE ADULT - ASSESSMENT
89-year-old male with PMHx of SCC, HTN , spinal stenosis presents to the emergency department with son with report of left lower extremity infection concern for OM now with concern for afib on his EKG.     Abnormal EKG, possible Afib,, cardiac optimization   - EKG: irregular rhythm, was felt to be possible Afib vs SR with mobitz type I and PACs  - has seemed to be more convincingly sr on more recent review  - No sign of acute ischemia  - Given advanced age, ? compliance with meds, hx of delirium, on enhanced observation for safety, he is poor candidate for long term AC, risks outweighs benefits    - No sign of volume overload, non orthopneic  - No O2 requirement laying flat  - Can resume home Losartan     - Vascular consulted and no further intervention given comorbidities  - MRI left foot/ankle appreciated: possible osteo. Discussed with podiatry and plan for surgical intervention on 10/31. Son agreeable to temporarily rescinding DNR/DNI     - His advanced age do put the patient at a higher surgical risk, though this is non-modifiable at current time, however he has no active ischemia, decompensated heart failure, unstable arrythmia, or severe stenotic valvular disease,  Patient is optimized from cardiovascular standpoint to proceed with planned procedure with routine hemodynamic monitoring.     - Monitor and replete Lytes. Keep K > 4 and Mg > 2  - Will continue to follow.    [facility]  Nurse Practitioner - Cardiology   call TEAMS              - Monitor and replete lytes, keep K>4, Mg>2.  - Will continue to follow.

## 2023-10-30 NOTE — PROGRESS NOTE ADULT - SUBJECTIVE AND OBJECTIVE BOX
[facility] -- Genny Mora Pannella, Patel, Savella, Goodger, Cohen  Office # [phone]    Follow Up:  Cardiac Arrythmia     Subjective/Observations: Patient seen and examined, asleep easily awaken, on enhanced supervision for safety, alert, forgetful, unable to provide meaningful information at this time, NAD, Tolerating room air. IVF infusing. No events overnight     REVIEW OF SYSTEMS: All other review of systems is negative unless indicated above  PAST MEDICAL & SURGICAL HISTORY:  No pertinent past medical history      H/O hernia repair        MEDICATIONS  (STANDING):  piperacillin/tazobactam IVPB.- 3.375 Gram(s) IV Intermittent once  piperacillin/tazobactam IVPB.. 3.375 Gram(s) IV Intermittent every 8 hours  QUEtiapine 25 milliGRAM(s) Oral at bedtime  tamsulosin 0.4 milliGRAM(s) Oral at bedtime    MEDICATIONS  (PRN):  acetaminophen     Tablet .. 650 milliGRAM(s) Oral every 6 hours PRN Temp greater or equal to 38C (100.4F), Mild Pain (1 - 3)  OLANZapine Injectable 2.5 milliGRAM(s) IntraMuscular every 6 hours PRN agitation    Allergies    No Known Allergies    Intolerances      Vital Signs Last 24 Hrs  T(C): 36.4 (30 Oct 2023 05:12), Max: 36.8 (29 Oct 2023 20:04)  T(F): 97.6 (30 Oct 2023 05:12), Max: 98.3 (29 Oct 2023 20:04)  HR: 98 (30 Oct 2023 05:12) (90 - 98)  BP: 158/78 (30 Oct 2023 05:12) (154/77 - 169/72)  BP(mean): --  RR: 20 (30 Oct 2023 05:12) (18 - 20)  SpO2: 94% (30 Oct 2023 05:12) (94% - 98%)    Parameters below as of 30 Oct 2023 05:12  Patient On (Oxygen Delivery Method): room air      I&O's Summary    29 Oct 2023 07:01  -  30 Oct 2023 07:00  --------------------------------------------------------  IN: 240 mL / OUT: 0 mL / NET: 240 mL          TELE: Not on telemetry   PHYSICAL EXAM:  Constitutional: NAD, awake and alert  HEENT: Moist Mucous Membranes, Anicteric  Pulmonary: Non-labored, breath sounds are clear bilaterally, No wheezing, rales or rhonchi  Cardiovascular: IRRR, S1 and S2, No murmurs, rubs, gallops or clicks  Gastrointestinal: Bowel Sounds present, soft, nontender.   Lymph: No peripheral edema. No lymphadenopathy.  Skin: left lower ext wound with dressing CDI   Psych:  Mood & affect appropriate  LABS: All Labs Reviewed:                        11.8   12.68 )-----------( 306      ( 28 Oct 2023 07:50 )             36.1                         12.1   13.24 )-----------( 290      ( 27 Oct 2023 10:50 )             37.5     28 Oct 2023 07:50    142    |  106    |  21     ----------------------------<  140    3.5     |  31     |  1.10   27 Oct 2023 10:50    144    |  108    |  19     ----------------------------<  121    3.8     |  29     |  1.10     Ca    8.6        28 Oct 2023 07:50  Ca    8.8        27 Oct 2023 10:50            12 Lead ECG:   Ventricular Rate 93 BPM    Atrial Rate 93 BPM    P-R Interval 184 ms    QRS Duration 112 ms    Q-T Interval 388 ms    QTC Calculation(Bazett) 482 ms    P Axis 65 degrees    R Axis -58 degrees    T Axis 67 degrees    Diagnosis Line Sinus rhythm withpremature supraventricular complexes and with occasional premature ventricular complexes  Left anterior fascicular block  Moderate voltage criteria for LVH, may be normal variant ( R in aVL , Preston product )  Possible Anterior infarct (cited on or before 25-OCT-2023)  Abnormal ECG  Confirmed by doug Harrell ([id number]) on 10/26/2023 3:27:22 PM (10-25-23 @ 14:00)

## 2023-10-30 NOTE — PROGRESS NOTE ADULT - ASSESSMENT
89-year-old male with PMHx of SCC, who presented with left lower extremity wound. Concern for infected LLE ulcer. Wound culture grew pseudomonas. MRI showed no evidence of osteomyelitis underneath posterior ankle wound, but noted to have concern for osteomyelitis of left hallux.    Plan for debridement today per Podiatry. Concern that PICC and long term IV antibiotics might pose more risks than benefits for patient. Discussed this in detail with son, however he is undecided yet if he wants PICC or not. He says he will make decision after debridement.    #LLE infected ulcer  #L hallux osteomyelitis  #Leukocytosis    -continue Zosyn  -follow cultures to completion  -monitor WBC  -wound care  -discussed with son at bedside   -discussed with Dr. Florentino Azar MD  Division of Infectious Diseases   Cell [phone] between 8am and 6pm   After 6pm and weekends please call ID service at [phone].         55 minutes spent on total encounter assessing patient, examination, chart review, counseling and coordinating care by the attending physician/nurse/care manager.  89-year-old male with PMHx of SCC, who presented with left lower extremity wound. Concern for infected LLE ulcer. Wound culture grew pseudomonas. MRI showed no evidence of osteomyelitis underneath posterior ankle wound, but noted to have concern for osteomyelitis of left hallux.    Plan for debridement tomorrow per Podiatry. Concern that PICC and long term IV antibiotics might pose more risks than benefits for patient. Discussed this in detail with son, however he is undecided yet if he wants PICC or not. He says he will make decision after debridement.    #LLE infected ulcer  #L hallux osteomyelitis  #Leukocytosis    -continue Zosyn  -follow cultures to completion  -monitor WBC  -wound care  -discussed with son at bedside   -discussed with Dr. Florentino Azar MD  Division of Infectious Diseases   Cell [phone] between 8am and 6pm   After 6pm and weekends please call ID service at [phone].         55 minutes spent on total encounter assessing patient, examination, chart review, counseling and coordinating care by the attending physician/nurse/care manager.

## 2023-10-30 NOTE — PROGRESS NOTE ADULT - PROBLEM SELECTOR PROBLEM 6
[FreeTextEntry1] : DELANO CARNEY  is a 57 year old F  who presents with hx of uti, HTN. \par \par #UTI\par POCT-urine dip: nl\par ordered UA and urine culture for urinary urgency and frequency. \par to f/u the result.\par \par #lower back and rt leg pain most c/w sciatica.  No red flags. \par Rt leg straight raise test+\par Pt tried physical therapy, chiropractics, taking tylenol and mobic 15mg po qd.\par [facility]  for the further evaluation.  \par Meanwhile continue moist heat, NSAIDs, topical NSAID. \par \par #skin \par Left lateral breast and left upper leg area - small furuncle like lesions.\par Central posterior neck soft bump: r/o lipoma?\par Referred to Derm  for skin lesions.\par Ordered US to evaluate for posterior neck lipoma. \par \par #HTN\par /85. no CP, SOB, leg swellings\par ECG today: sinus bradycardia, no LVH. \par Reviewed with Dr. Gross.\par No need anti HTN med at this time, but instructed pt to document home BP and bring BP monitor to the clinic next time. \par continue low fat low salt diet and increase exercise.\par \par Lab drawn  as below. \par f/u 3 months,  or sooner if needed.\par \par \par \par \par \par \par \par \par  Lumbar spinal stenosis

## 2023-10-30 NOTE — PROGRESS NOTE ADULT - PROBLEM SELECTOR PLAN 1
LLE wound, significant drainage, circumferentially affected the ankle  - ID consult appreciated  - Wound culture with few pseudomonas which is pansensitive  - Currently on Zosyn (vancomycin DC)  - elevated inflammatory markers noted  - Podiatry consult noted  - Left US: "Elevated velocities in the peroneal artery suggestive of a stenosis. No evidence of occlusion. Diffuse monophasic flow below the knee suggestive of underlying disease."  - Vascular consulted and no further intervention given comorbidities  - MRI left foot/ankle appreciated: possible osteo. Discussed with podiatry and plan for surgical intervention on 10/31. Son agreeable to temporarily rescinding DNR/I  - Given this and risk for future recurrent infections, would qualify for home hospice. Will discuss with son further  - PT saw & rec ANGIE

## 2023-10-30 NOTE — PROGRESS NOTE ADULT - SUBJECTIVE AND OBJECTIVE BOX
[facility]  INFECTIOUS DISEASES - [address], [place] [address]  Tel: [phone]     Fax: [phone]  =======================================================    ALICIA HERNANDEZ [id number]    Follow up: No fevers. Patient denies any pain, SOB, nausea or diarrhea.    Allergies:  No Known Allergies      Antibiotics:  acetaminophen     Tablet .. 650 milliGRAM(s) Oral every 6 hours PRN  OLANZapine Injectable 2.5 milliGRAM(s) IntraMuscular every 6 hours PRN  piperacillin/tazobactam IVPB.- 3.375 Gram(s) IV Intermittent once  piperacillin/tazobactam IVPB.. 3.375 Gram(s) IV Intermittent every 8 hours  QUEtiapine 25 milliGRAM(s) Oral at bedtime  tamsulosin 0.4 milliGRAM(s) Oral at bedtime       REVIEW OF SYSTEMS:  limited 2/2 mental status, as per HPI     Physical Exam:  ICU Vital Signs Last 24 Hrs  T(C): 36.4 (30 Oct 2023 05:12), Max: 36.8 (29 Oct 2023 20:04)  T(F): 97.6 (30 Oct 2023 05:12), Max: 98.3 (29 Oct 2023 20:04)  HR: 98 (30 Oct 2023 05:12) (90 - 98)  BP: 158/78 (30 Oct 2023 05:12) (158/78 - 169/72)  BP(mean): --  ABP: --  ABP(mean): --  RR: 20 (30 Oct 2023 05:12) (18 - 20)  SpO2: 94% (30 Oct 2023 05:12) (94% - 94%)    O2 Parameters below as of 30 Oct 2023 05:12  Patient On (Oxygen Delivery Method): room air           GEN: NAD  HEENT: normocephalic and atraumatic.   NECK: Supple.   LUNGS: Normal respiratory effort  HEART: Regular rate and rhythm   ABDOMEN: Soft, nontender, and nondistended.    EXTREMITIES: No leg edema.  NEUROLOGIC: Answering some simple questions  SKIN: (+) large ulceration on posterior L lower calf with necrotic tissue    Labs:                  RECENT CULTURES:  10-24 @ 07:00 Wound Wound Pseudomonas aeruginosa    Few Pseudomonas aeruginosa        10-23 @ 21:50 .Blood Blood-Peripheral     No growth at 5 days              All imaging and data are reviewed.     < from: MR Foot w/wo IV Cont, Left (10.27.23 @ 15:01) >  FINDINGS: Left foot: There is a cutaneous wound along medial aspect of   the hallux at the level of the IP joint. There is some adjacent skin   thickening subcutaneous fat infiltration, consistent with cellulitis.   There is high T2 signal in the base of the distal phalanx and medial   aspect of the first proximal phalanx, suspicious for early associated   osteomyelitis. There are no joint effusions or advanced arthritic   changes. There is diffuse fatty atrophy and high T2 signal of   musculature, consistent with denervation.    Left ankle: There is a large cutaneous wound along the posterior aspect   of the ankle with an associated defect in the Achilles tendon spanning 7   cm cranial to caudal. There is adjacent cellulitic change. There are no   areas of marrow signal alteration that raise suspicion for osteomyelitis.   There are no joint effusions or advanced arthritic changes. There is   diffuse fatty atrophy and high T2 signal of the foot musculature,   consistent with denervation.        IMPRESSION: Left foot: Cutaneous wound along the medial aspect of the   hallux at the level of the IP joint with adjacent cellulitis. Adjacent   signal alteration in the base of the hallux distal phalanx and within the   head of the hallux proximal phalanx, suspicious for associated   osteomyelitis.    Left ankle: Large cutaneous wound along the posterior aspect of the ankle   with an associated defect in the Achilles tendon, as above. No   osteomyelitis.      < end of copied text >

## 2023-10-30 NOTE — PROGRESS NOTE ADULT - SUBJECTIVE AND OBJECTIVE BOX
Patient is a 89y old  Male who presents with a chief complaint of Left leg wound (29 Oct 2023 12:30)      SUBJECTIVE / OVERNIGHT EVENTS: Patient seen and examined at bedside. No acute events overnight. Feels well without pain. Much more calm. Seems to understand plan is for podiatric intervention tomorrow.    MEDICATIONS  (STANDING):  piperacillin/tazobactam IVPB.- 3.375 Gram(s) IV Intermittent once  piperacillin/tazobactam IVPB.. 3.375 Gram(s) IV Intermittent every 8 hours  QUEtiapine 25 milliGRAM(s) Oral at bedtime  tamsulosin 0.4 milliGRAM(s) Oral at bedtime    MEDICATIONS  (PRN):  acetaminophen     Tablet .. 650 milliGRAM(s) Oral every 6 hours PRN Temp greater or equal to 38C (100.4F), Mild Pain (1 - 3)  OLANZapine Injectable 2.5 milliGRAM(s) IntraMuscular every 6 hours PRN agitation      CAPILLARY BLOOD GLUCOSE        I&O's Summary    29 Oct 2023 07:01  -  30 Oct 2023 07:00  --------------------------------------------------------  IN: 240 mL / OUT: 0 mL / NET: 240 mL        PHYSICAL EXAM:  Vital Signs Last 24 Hrs  T(C): 36.4 (30 Oct 2023 05:12), Max: 36.8 (29 Oct 2023 20:04)  T(F): 97.6 (30 Oct 2023 05:12), Max: 98.3 (29 Oct 2023 20:04)  HR: 98 (30 Oct 2023 05:12) (90 - 98)  BP: 158/78 (30 Oct 2023 05:12) (154/77 - 169/72)  BP(mean): --  RR: 20 (30 Oct 2023 05:12) (18 - 20)  SpO2: 94% (30 Oct 2023 05:12) (94% - 98%)    Parameters below as of 30 Oct 2023 05:12  Patient On (Oxygen Delivery Method): room air        GEN: male in NAD, appears comfortable, no diaphoresis  EYES: No scleral injection, EOMI  ENTM: neck supple & symmetric without tracheal deviation, moist membranes, no gross hearing impairment, thyroid gland not enlarged  CV: +S1/S2, no m/r/g, no abdominal bruit, no LE edema  RESP: breathing comfortably, no respiratory accessory muscle use, CTAB, no w/r/r  GI: normoactive BS, soft, NTND, no rebounding/guarding, no palpable masses    LABS:                      RADIOLOGY & ADDITIONAL TESTS:  Results Reviewed:   Imaging Personally Reviewed:  Electrocardiogram Personally Reviewed:    COORDINATION OF CARE:  Care Discussed with Consultants/Other Providers [Y/N]:  Prior or Outpatient Records Reviewed [Y/N]:

## 2023-10-30 NOTE — PROGRESS NOTE ADULT - NS ATTEST RISK PROBLEM GEN_ALL_CORE FT
IV Antibiotics and need to monitor for toxicity.
IV Antibiotics and monitoring for nephrotoxicity
IV antibiotics and monitoring for toxicity

## 2023-10-31 LAB
ALBUMIN SERPL ELPH-MCNC: 2.6 G/DL — LOW (ref 3.3–5)
ALBUMIN SERPL ELPH-MCNC: 2.6 G/DL — LOW (ref 3.3–5)
ALP SERPL-CCNC: 81 U/L — SIGNIFICANT CHANGE UP (ref 40–120)
ALP SERPL-CCNC: 81 U/L — SIGNIFICANT CHANGE UP (ref 40–120)
ALT FLD-CCNC: 27 U/L — SIGNIFICANT CHANGE UP (ref 12–78)
ALT FLD-CCNC: 27 U/L — SIGNIFICANT CHANGE UP (ref 12–78)
ANION GAP SERPL CALC-SCNC: 7 MMOL/L — SIGNIFICANT CHANGE UP (ref 5–17)
ANION GAP SERPL CALC-SCNC: 7 MMOL/L — SIGNIFICANT CHANGE UP (ref 5–17)
APTT BLD: 32.4 SEC — SIGNIFICANT CHANGE UP (ref 24.5–35.6)
APTT BLD: 32.4 SEC — SIGNIFICANT CHANGE UP (ref 24.5–35.6)
AST SERPL-CCNC: 22 U/L — SIGNIFICANT CHANGE UP (ref 15–37)
AST SERPL-CCNC: 22 U/L — SIGNIFICANT CHANGE UP (ref 15–37)
BILIRUB SERPL-MCNC: 0.6 MG/DL — SIGNIFICANT CHANGE UP (ref 0.2–1.2)
BILIRUB SERPL-MCNC: 0.6 MG/DL — SIGNIFICANT CHANGE UP (ref 0.2–1.2)
BUN SERPL-MCNC: 16 MG/DL — SIGNIFICANT CHANGE UP (ref 7–23)
BUN SERPL-MCNC: 16 MG/DL — SIGNIFICANT CHANGE UP (ref 7–23)
CALCIUM SERPL-MCNC: 8.7 MG/DL — SIGNIFICANT CHANGE UP (ref 8.5–10.1)
CALCIUM SERPL-MCNC: 8.7 MG/DL — SIGNIFICANT CHANGE UP (ref 8.5–10.1)
CHLORIDE SERPL-SCNC: 107 MMOL/L — SIGNIFICANT CHANGE UP (ref 96–108)
CHLORIDE SERPL-SCNC: 107 MMOL/L — SIGNIFICANT CHANGE UP (ref 96–108)
CO2 SERPL-SCNC: 28 MMOL/L — SIGNIFICANT CHANGE UP (ref 22–31)
CO2 SERPL-SCNC: 28 MMOL/L — SIGNIFICANT CHANGE UP (ref 22–31)
CREAT SERPL-MCNC: 0.96 MG/DL — SIGNIFICANT CHANGE UP (ref 0.5–1.3)
CREAT SERPL-MCNC: 0.96 MG/DL — SIGNIFICANT CHANGE UP (ref 0.5–1.3)
EGFR: 71 ML/MIN/1.73M2 — SIGNIFICANT CHANGE UP
EGFR: 71 ML/MIN/1.73M2 — SIGNIFICANT CHANGE UP
GLUCOSE SERPL-MCNC: 112 MG/DL — HIGH (ref 70–99)
GLUCOSE SERPL-MCNC: 112 MG/DL — HIGH (ref 70–99)
GRAM STN FLD: SIGNIFICANT CHANGE UP
GRAM STN FLD: SIGNIFICANT CHANGE UP
HCT VFR BLD CALC: 39.7 % — SIGNIFICANT CHANGE UP (ref 39–50)
HCT VFR BLD CALC: 39.7 % — SIGNIFICANT CHANGE UP (ref 39–50)
HGB BLD-MCNC: 12.6 G/DL — LOW (ref 13–17)
HGB BLD-MCNC: 12.6 G/DL — LOW (ref 13–17)
INR BLD: 1.19 RATIO — HIGH (ref 0.85–1.18)
INR BLD: 1.19 RATIO — HIGH (ref 0.85–1.18)
MAGNESIUM SERPL-MCNC: 2.2 MG/DL — SIGNIFICANT CHANGE UP (ref 1.6–2.6)
MAGNESIUM SERPL-MCNC: 2.2 MG/DL — SIGNIFICANT CHANGE UP (ref 1.6–2.6)
MCHC RBC-ENTMCNC: 27.2 PG — SIGNIFICANT CHANGE UP (ref 27–34)
MCHC RBC-ENTMCNC: 27.2 PG — SIGNIFICANT CHANGE UP (ref 27–34)
MCHC RBC-ENTMCNC: 31.7 GM/DL — LOW (ref 32–36)
MCHC RBC-ENTMCNC: 31.7 GM/DL — LOW (ref 32–36)
MCV RBC AUTO: 85.7 FL — SIGNIFICANT CHANGE UP (ref 80–100)
MCV RBC AUTO: 85.7 FL — SIGNIFICANT CHANGE UP (ref 80–100)
NRBC # BLD: 0 /100 WBCS — SIGNIFICANT CHANGE UP (ref 0–0)
NRBC # BLD: 0 /100 WBCS — SIGNIFICANT CHANGE UP (ref 0–0)
PHOSPHATE SERPL-MCNC: 2.4 MG/DL — LOW (ref 2.5–4.5)
PHOSPHATE SERPL-MCNC: 2.4 MG/DL — LOW (ref 2.5–4.5)
PLATELET # BLD AUTO: 329 K/UL — SIGNIFICANT CHANGE UP (ref 150–400)
PLATELET # BLD AUTO: 329 K/UL — SIGNIFICANT CHANGE UP (ref 150–400)
POTASSIUM SERPL-MCNC: 3.9 MMOL/L — SIGNIFICANT CHANGE UP (ref 3.5–5.3)
POTASSIUM SERPL-MCNC: 3.9 MMOL/L — SIGNIFICANT CHANGE UP (ref 3.5–5.3)
POTASSIUM SERPL-SCNC: 3.9 MMOL/L — SIGNIFICANT CHANGE UP (ref 3.5–5.3)
POTASSIUM SERPL-SCNC: 3.9 MMOL/L — SIGNIFICANT CHANGE UP (ref 3.5–5.3)
PROT SERPL-MCNC: 6.4 G/DL — SIGNIFICANT CHANGE UP (ref 6–8.3)
PROT SERPL-MCNC: 6.4 G/DL — SIGNIFICANT CHANGE UP (ref 6–8.3)
PROTHROM AB SERPL-ACNC: 13.9 SEC — HIGH (ref 9.5–13)
PROTHROM AB SERPL-ACNC: 13.9 SEC — HIGH (ref 9.5–13)
RBC # BLD: 4.63 M/UL — SIGNIFICANT CHANGE UP (ref 4.2–5.8)
RBC # BLD: 4.63 M/UL — SIGNIFICANT CHANGE UP (ref 4.2–5.8)
RBC # FLD: 13.7 % — SIGNIFICANT CHANGE UP (ref 10.3–14.5)
RBC # FLD: 13.7 % — SIGNIFICANT CHANGE UP (ref 10.3–14.5)
SODIUM SERPL-SCNC: 142 MMOL/L — SIGNIFICANT CHANGE UP (ref 135–145)
SODIUM SERPL-SCNC: 142 MMOL/L — SIGNIFICANT CHANGE UP (ref 135–145)
SPECIMEN SOURCE: SIGNIFICANT CHANGE UP
SPECIMEN SOURCE: SIGNIFICANT CHANGE UP
WBC # BLD: 10.03 K/UL — SIGNIFICANT CHANGE UP (ref 3.8–10.5)
WBC # BLD: 10.03 K/UL — SIGNIFICANT CHANGE UP (ref 3.8–10.5)
WBC # FLD AUTO: 10.03 K/UL — SIGNIFICANT CHANGE UP (ref 3.8–10.5)
WBC # FLD AUTO: 10.03 K/UL — SIGNIFICANT CHANGE UP (ref 3.8–10.5)

## 2023-10-31 PROCEDURE — 99231 SBSQ HOSP IP/OBS SF/LOW 25: CPT

## 2023-10-31 PROCEDURE — 20245 BONE BIOPSY OPEN DEEP: CPT | Mod: TA

## 2023-10-31 PROCEDURE — 11043 DBRDMT MUSC&/FSCA 1ST 20/<: CPT | Mod: LT

## 2023-10-31 PROCEDURE — 73630 X-RAY EXAM OF FOOT: CPT | Mod: 26,LT

## 2023-10-31 PROCEDURE — 99233 SBSQ HOSP IP/OBS HIGH 50: CPT

## 2023-10-31 PROCEDURE — 11046 DBRDMT MUSC&/FSCA EA ADDL: CPT | Mod: LT

## 2023-10-31 PROCEDURE — 20240 BONE BIOPSY OPEN SUPERFICIAL: CPT

## 2023-10-31 PROCEDURE — 99232 SBSQ HOSP IP/OBS MODERATE 35: CPT

## 2023-10-31 PROCEDURE — 88304 TISSUE EXAM BY PATHOLOGIST: CPT | Mod: 26

## 2023-10-31 PROCEDURE — 88311 DECALCIFY TISSUE: CPT | Mod: 26

## 2023-10-31 RX ORDER — PIPERACILLIN AND TAZOBACTAM 4; .5 G/20ML; G/20ML
3.38 INJECTION, POWDER, LYOPHILIZED, FOR SOLUTION INTRAVENOUS EVERY 8 HOURS
Refills: 0 | Status: DISCONTINUED | OUTPATIENT
Start: 2023-10-31 | End: 2023-11-03

## 2023-10-31 RX ORDER — OLANZAPINE 15 MG/1
2.5 TABLET, FILM COATED ORAL EVERY 6 HOURS
Refills: 0 | Status: DISCONTINUED | OUTPATIENT
Start: 2023-10-31 | End: 2023-11-03

## 2023-10-31 RX ORDER — SODIUM CHLORIDE 9 MG/ML
1000 INJECTION, SOLUTION INTRAVENOUS
Refills: 0 | Status: DISCONTINUED | OUTPATIENT
Start: 2023-10-31 | End: 2023-10-31

## 2023-10-31 RX ORDER — HYDROMORPHONE HYDROCHLORIDE 2 MG/ML
0.5 INJECTION INTRAMUSCULAR; INTRAVENOUS; SUBCUTANEOUS
Refills: 0 | Status: DISCONTINUED | OUTPATIENT
Start: 2023-10-31 | End: 2023-10-31

## 2023-10-31 RX ORDER — ONDANSETRON 8 MG/1
4 TABLET, FILM COATED ORAL ONCE
Refills: 0 | Status: DISCONTINUED | OUTPATIENT
Start: 2023-10-31 | End: 2023-10-31

## 2023-10-31 RX ORDER — TAMSULOSIN HYDROCHLORIDE 0.4 MG/1
0.4 CAPSULE ORAL AT BEDTIME
Refills: 0 | Status: DISCONTINUED | OUTPATIENT
Start: 2023-10-31 | End: 2023-11-03

## 2023-10-31 RX ORDER — PIPERACILLIN AND TAZOBACTAM 4; .5 G/20ML; G/20ML
3.38 INJECTION, POWDER, LYOPHILIZED, FOR SOLUTION INTRAVENOUS ONCE
Refills: 0 | Status: DISCONTINUED | OUTPATIENT
Start: 2023-10-31 | End: 2023-10-31

## 2023-10-31 RX ORDER — QUETIAPINE FUMARATE 200 MG/1
25 TABLET, FILM COATED ORAL AT BEDTIME
Refills: 0 | Status: DISCONTINUED | OUTPATIENT
Start: 2023-10-31 | End: 2023-11-03

## 2023-10-31 RX ORDER — AMLODIPINE BESYLATE 2.5 MG/1
10 TABLET ORAL ONCE
Refills: 0 | Status: COMPLETED | OUTPATIENT
Start: 2023-10-31 | End: 2023-10-31

## 2023-10-31 RX ORDER — ENOXAPARIN SODIUM 100 MG/ML
40 INJECTION SUBCUTANEOUS EVERY 24 HOURS
Refills: 0 | Status: DISCONTINUED | OUTPATIENT
Start: 2023-11-01 | End: 2023-11-03

## 2023-10-31 RX ORDER — ACETAMINOPHEN 500 MG
650 TABLET ORAL EVERY 6 HOURS
Refills: 0 | Status: DISCONTINUED | OUTPATIENT
Start: 2023-10-31 | End: 2023-11-03

## 2023-10-31 RX ADMIN — OLANZAPINE 2.5 MILLIGRAM(S): 15 TABLET, FILM COATED ORAL at 02:26

## 2023-10-31 RX ADMIN — AMLODIPINE BESYLATE 10 MILLIGRAM(S): 2.5 TABLET ORAL at 20:53

## 2023-10-31 RX ADMIN — PIPERACILLIN AND TAZOBACTAM 25 GRAM(S): 4; .5 INJECTION, POWDER, LYOPHILIZED, FOR SOLUTION INTRAVENOUS at 23:57

## 2023-10-31 RX ADMIN — SODIUM CHLORIDE 60 MILLILITER(S): 9 INJECTION, SOLUTION INTRAVENOUS at 16:18

## 2023-10-31 RX ADMIN — ENOXAPARIN SODIUM 40 MILLIGRAM(S): 100 INJECTION SUBCUTANEOUS at 23:57

## 2023-10-31 RX ADMIN — PIPERACILLIN AND TAZOBACTAM 25 GRAM(S): 4; .5 INJECTION, POWDER, LYOPHILIZED, FOR SOLUTION INTRAVENOUS at 16:13

## 2023-10-31 RX ADMIN — PIPERACILLIN AND TAZOBACTAM 25 GRAM(S): 4; .5 INJECTION, POWDER, LYOPHILIZED, FOR SOLUTION INTRAVENOUS at 07:09

## 2023-10-31 NOTE — BRIEF OPERATIVE NOTE - NSICDXBRIEFPROCEDURE_GEN_ALL_CORE_FT
PROCEDURES:  Selective debridement of wound 31-Oct-2023 15:41:40  Angel Echevarria  Open biopsy, bone, toe 31-Oct-2023 15:41:55  Angel Echevarria

## 2023-10-31 NOTE — PROGRESS NOTE ADULT - SUBJECTIVE AND OBJECTIVE BOX
[facility] -- Genny Mora Pannella, Patel, Savella, Goodger, Cohen  Office # [phone]    Follow Up:    Cardiac arrythmia  Subjective/Observations:   Patient seen and examined. There are no reports of chest pain or shortness of breath.      REVIEW OF SYSTEMS: All other review of systems is negative unless indicated above  PAST MEDICAL & SURGICAL HISTORY:  No pertinent past medical history      H/O hernia repair    MEDICATIONS  (STANDING):  piperacillin/tazobactam IVPB.- 3.375 Gram(s) IV Intermittent once  piperacillin/tazobactam IVPB.. 3.375 Gram(s) IV Intermittent every 8 hours  QUEtiapine 25 milliGRAM(s) Oral at bedtime  tamsulosin 0.4 milliGRAM(s) Oral at bedtime    MEDICATIONS  (PRN):  acetaminophen     Tablet .. 650 milliGRAM(s) Oral every 6 hours PRN Temp greater or equal to 38C (100.4F), Mild Pain (1 - 3)  OLANZapine Injectable 2.5 milliGRAM(s) IntraMuscular every 6 hours PRN agitation    Allergies    No Known Allergies    Intolerances      Vital Signs Last 24 Hrs  T(C): 36.3 (31 Oct 2023 04:27), Max: 36.4 (30 Oct 2023 21:27)  T(F): 97.3 (31 Oct 2023 04:27), Max: 97.5 (30 Oct 2023 21:27)  HR: 101 (31 Oct 2023 04:27) (99 - 101)  BP: 170/77 (31 Oct 2023 04:27) (170/77 - 173/87)  BP(mean): --  RR: 19 (31 Oct 2023 04:27) (19 - 24)  SpO2: 94% (31 Oct 2023 04:27) (94% - 95%)    Parameters below as of 31 Oct 2023 04:27  Patient On (Oxygen Delivery Method): room air      I&O's Summary    30 Oct 2023 07:01  -  31 Oct 2023 07:00  --------------------------------------------------------  IN: 0 mL / OUT: 500 mL / NET: -500 mL        PHYSICAL EXAM:  TELE:   Constitutional: NAD, awake and alert, well-developed  HEENT: Moist Mucous Membranes, Anicteric  Pulmonary: Non-labored, breath sounds are clear bilaterally, No wheezing, rales or rhonchi  Cardiovascular: irregular rate and rhythm, no murmurs, rubs, gallops or clicks  Gastrointestinal: Bowel Sounds present, soft, nontender.   Lymph: No peripheral edema. No lymphadenopathy.  Skin: No visible rashes or ulcers.  Psych:  Mood & affect appropriate  LABS: All Labs Reviewed:                        12.6   10.03 )-----------( 329      ( 31 Oct 2023 06:12 )             39.7     31 Oct 2023 06:12    142    |  107    |  16     ----------------------------<  112    3.9     |  28     |  0.96     Ca    8.7        31 Oct 2023 06:12  Phos  2.4       31 Oct 2023 06:12  Mg     2.2       31 Oct 2023 06:12    TPro  6.4    /  Alb  2.6    /  TBili  0.6    /  DBili  x      /  AST  22     /  ALT  27     /  AlkPhos  81     31 Oct 2023 06:12    PT/INR - ( 31 Oct 2023 06:12 )   PT: 13.9 sec;   INR: 1.19 ratio         PTT - ( 31 Oct 2023 06:12 )  PTT:32.4 sec       12 Lead ECG:   Ventricular Rate 93 BPM    Atrial Rate 93 BPM    P-R Interval 184 ms    QRS Duration 112 ms    Q-T Interval 388 ms    QTC Calculation(Bazett) 482 ms    P Axis 65 degrees    R Axis -58 degrees    T Axis 67 degrees    Diagnosis Line Sinus rhythm withpremature supraventricular complexes and with occasional premature ventricular complexes  Left anterior fascicular block  Moderate voltage criteria for LVH, may be normal variant ( R in aVL , [place] product )  Possible Anterior infarct (cited on or before 25-OCT-2023)  Abnormal ECG  Confirmed by doug Harrell ([id number]) on 10/26/2023 3:27:22 PM (10-25-23 @ 14:00)

## 2023-10-31 NOTE — PROGRESS NOTE ADULT - PROBLEM SELECTOR PLAN 1
LLE wound, significant drainage, circumferentially affected the ankle  - ID consult appreciated  - Wound culture with few pseudomonas which is pansensitive  - Currently on Zosyn (vancomycin DC)  - elevated inflammatory markers noted  - Podiatry consult noted  - Left US: "Elevated velocities in the peroneal artery suggestive of a stenosis. No evidence of occlusion. Diffuse monophasic flow below the knee suggestive of underlying disease."  - Vascular consulted and no further intervention given comorbidities  - MRI left foot/ankle appreciated: possible osteo. Discussed with podiatry and plan for surgical intervention on 10/31. Son agreeable to temporarily rescinding DNR/I  - Given this and risk for future recurrent infections, would qualify for home hospice. Will discuss with son further  - PT saw & rec ANGIE  - Possible PICC and prolonged abx so will follow up with ID  - Patient medically optimized for procedure. No decompensated cardiac/pulmonary issue. No further work up needed

## 2023-10-31 NOTE — PROGRESS NOTE ADULT - ASSESSMENT
89-year-old male with PMHx of SCC, HTN , spinal stenosis presents to the emergency department with son with report of left lower extremity infection concern for OM now with concern for afib on his EKG.     Abnormal EKG, possible Afib,, cardiac optimization   - EKG: irregular rhythm, was felt to be possible Afib vs SR with mobitz type I and PACs  -not a candidate for AC, (as per discussion with family and his advanced age)  -does not wish to take AV norbert blockers due to fatigue?  - No sign of volume overload, non orthopneic  - No O2 requirement laying flat  - Can resume home Losartan     - Vascular consulted and no further intervention given comorbidities  - MRI left foot/ankle appreciated: possible osteo. Discussed with podiatry and plan for surgical intervention on 10/31. Son agreeable to temporarily rescinding DNR/DNI     - His advanced age do put the patient at a higher surgical risk, though this is non-modifiable at current time, however he has no active ischemia, decompensated heart failure, unstable arrythmia, or severe stenotic valvular disease,  Patient is optimized from cardiovascular standpoint to proceed with planned procedure with routine hemodynamic monitoring.     - Monitor and replete Lytes. Keep K > 4 and Mg > 2  - Will continue to follow.

## 2023-10-31 NOTE — CHART NOTE - NSCHARTNOTEFT_GEN_A_CORE
- RN notified provider that pts BP was elevated to 184/90 on manual auscultation  - Pt is sleeping comfortabely in bed, other VSS  - Amlodipine 10mg x 1 ordered-- f/u BP in 1 hr  - BPs have been elevating steadily throughout patients stay  - Recommend day team to consider daily oral agents for medical optimization of BP  - RN to notify provider with changes - RN notified provider that pts BP was elevated to 184/90 on manual auscultation  - Pt is sleeping comfortabely in bed, other VSS  - Amlodipine 10mg x 1 ordered-- f/u /84  - BPs have been elevating steadily throughout patients stay  - Recommend day team to consider daily oral agents for medical optimization of BP  - RN to notify provider with changes

## 2023-10-31 NOTE — PROGRESS NOTE ADULT - SUBJECTIVE AND OBJECTIVE BOX
[facility]  INFECTIOUS DISEASES - Raphael Georges, [address], [place] [address]  Tel: [phone]     Fax: [phone]  =======================================================    ALICIA HERNANDEZ [id number]    Follow up: No fevers. Denies any pain or SOB.    Allergies:  No Known Allergies      Antibiotics:  acetaminophen     Tablet .. 650 milliGRAM(s) Oral every 6 hours PRN  OLANZapine Injectable 2.5 milliGRAM(s) IntraMuscular every 6 hours PRN  piperacillin/tazobactam IVPB.- 3.375 Gram(s) IV Intermittent once  piperacillin/tazobactam IVPB.. 3.375 Gram(s) IV Intermittent every 8 hours  QUEtiapine 25 milliGRAM(s) Oral at bedtime  tamsulosin 0.4 milliGRAM(s) Oral at bedtime       REVIEW OF SYSTEMS:  limited 2/2 mental status, as per HPI     Physical Exam:  ICU Vital Signs Last 24 Hrs  T(C): 36.9 (31 Oct 2023 14:10), Max: 36.9 (31 Oct 2023 12:24)  T(F): 98.4 (31 Oct 2023 14:10), Max: 98.4 (31 Oct 2023 12:24)  HR: 83 (31 Oct 2023 14:10) (83 - 101)  BP: 156/84 (31 Oct 2023 14:10) (156/84 - 173/87)  BP(mean): --  ABP: --  ABP(mean): --  RR: 18 (31 Oct 2023 14:10) (18 - 24)  SpO2: 94% (31 Oct 2023 14:10) (94% - 95%)    O2 Parameters below as of 31 Oct 2023 14:10  Patient On (Oxygen Delivery Method): room air      GEN: NAD  HEENT: normocephalic and atraumatic.   NECK: Supple.   LUNGS: Normal respiratory effort  HEART: Regular rate and rhythm   ABDOMEN: Soft, nontender, and nondistended.    EXTREMITIES: No leg edema.  NEUROLOGIC: Answering some simple questions  SKIN: (+) large ulceration on posterior L lower calf with necrotic tissue  (+) L hallux ulcer    Labs:  10-31    142  |  107  |  16  ----------------------------<  112<H>  3.9   |  28  |  0.96    Ca    8.7      31 Oct 2023 06:12  Phos  2.4     10-31  Mg     2.2     10-31    TPro  6.4  /  Alb  2.6<L>  /  TBili  0.6  /  DBili  x   /  AST  22  /  ALT  27  /  AlkPhos  81  10-31                          12.6   10.03 )-----------( 329      ( 31 Oct 2023 06:12 )             39.7     PT/INR - ( 31 Oct 2023 06:12 )   PT: 13.9 sec;   INR: 1.19 ratio         PTT - ( 31 Oct 2023 06:12 )  PTT:32.4 sec  Urinalysis Basic - ( 31 Oct 2023 06:12 )    Color: x / Appearance: x / SG: x / pH: x  Gluc: 112 mg/dL / Ketone: x  / Bili: x / Urobili: x   Blood: x / Protein: x / Nitrite: x   Leuk Esterase: x / RBC: x / WBC x   Sq Epi: x / Non Sq Epi: x / Bacteria: x      LIVER FUNCTIONS - ( 31 Oct 2023 06:12 )  Alb: 2.6 g/dL / Pro: 6.4 g/dL / ALK PHOS: 81 U/L / ALT: 27 U/L / AST: 22 U/L / GGT: x             RECENT CULTURES:  10-24 @ 07:00 Wound Wound Pseudomonas aeruginosa    Few Pseudomonas aeruginosa        10-23 @ 21:50 .Blood Blood-Peripheral     No growth at 5 days              All imaging and data are reviewed.

## 2023-10-31 NOTE — BRIEF OPERATIVE NOTE - NSICDXBRIEFPREOP_GEN_ALL_CORE_FT
PRE-OP DIAGNOSIS:  Ankle wound, right, subsequent encounter 31-Oct-2023 15:42:11  Angel Echevarria  Open toe wound 31-Oct-2023 15:42:24  Angel Echevarria

## 2023-10-31 NOTE — CHART NOTE - NSCHARTNOTEFT_GEN_A_CORE
Assessment: patient seen in bed appears confused  and NPO for debridement today   89y old  Male who presents with a chief complaint of Left leg wound   patient is DNR/DNI   unable to complete nutrition focused physical exam this AM  per CNA patient with good PO yesterday  10.30 fecal incontinence       Factors impacting intake: [x ] none [ ] nausea  [ ] vomiting [ ] diarrhea [ ] constipation  [ ]chewing problems [ ] swallowing issues  [ ] other:     Diet Prescription: Diet, NPO after Midnight:      NPO Start Date: 30-Oct-2023,   NPO Start Time: 23:59  Except Medications  With Ice Chips/Sips of Water (10-30-23 @ 07:09)  Diet, Regular:   Supplement Feeding Modality:  Oral  Glucerna Shake Cans or Servings Per Day:  1       Frequency:  Daily  Ensure Max Cans or Servings Per Day:  1       Frequency:  Daily (10-25-23 @ 10:08)    Intake: up to 100% per flow sheet     Current Weight: 10/30 wt 172.6# no edema      Pertinent Medications: MEDICATIONS  (STANDING):  piperacillin/tazobactam IVPB.- 3.375 Gram(s) IV Intermittent once  piperacillin/tazobactam IVPB.. 3.375 Gram(s) IV Intermittent every 8 hours  QUEtiapine 25 milliGRAM(s) Oral at bedtime  tamsulosin 0.4 milliGRAM(s) Oral at bedtime    MEDICATIONS  (PRN):  acetaminophen     Tablet .. 650 milliGRAM(s) Oral every 6 hours PRN Temp greater or equal to 38C (100.4F), Mild Pain (1 - 3)  OLANZapine Injectable 2.5 milliGRAM(s) IntraMuscular every 6 hours PRN agitation    Pertinent Labs: A1c 6.3% Glucose 112, 140   Skin: multiple un-stageable , stage 1 and stage 2 pressure injuries    Estimated Needs:   [x ] no change since previous assessment based on # 72.5kg 25-30kcals/kg 1812-2175kcals and 1.2-1.4gms protein /kg .5 gms protein  [ ] recalculated:     Previous Nutrition Diagnosis:   [ ] Inadequate Energy Intake [ ]Inadequate Oral Intake [ ] Excessive Energy Intake   [ ] Underweight [x ] Increased Nutrient Needs [ ] Overweight/Obesity   [ ] Altered GI Function [ ] Unintended Weight Loss [ ] Food & Nutrition Related Knowledge Deficit [ ] Malnutrition     Nutrition Diagnosis is [x ] ongoing  [ ] resolved [ ] not applicable     New Nutrition Diagnosis: [x ] not applicable       Interventions:   Recommend  [ ] Change Diet To:  [ ] Nutrition Supplement  [ ] Nutrition Support  [x ] Other: advance to regular diet with glucerna and ensure max protein daily after procedure, recommend MVI and Vit C 500mg BID    Monitoring and Evaluation:   [x ] PO intake [ x ] Tolerance to diet prescription [ x ] weights [ x ] labs[ x ] follow up per protocol  [ ] other:

## 2023-10-31 NOTE — PROGRESS NOTE ADULT - SUBJECTIVE AND OBJECTIVE BOX
Patient is a 89y old  Male who presents with a chief complaint of Left leg wound (31 Oct 2023 04:26)      SUBJECTIVE / OVERNIGHT EVENTS: Patient seen and examined at bedside. No acute events overnight. Received Zyprexa x1 last night at 2 AM. No complaints this morning.    MEDICATIONS  (STANDING):  piperacillin/tazobactam IVPB.- 3.375 Gram(s) IV Intermittent once  piperacillin/tazobactam IVPB.. 3.375 Gram(s) IV Intermittent every 8 hours  QUEtiapine 25 milliGRAM(s) Oral at bedtime  tamsulosin 0.4 milliGRAM(s) Oral at bedtime    MEDICATIONS  (PRN):  acetaminophen     Tablet .. 650 milliGRAM(s) Oral every 6 hours PRN Temp greater or equal to 38C (100.4F), Mild Pain (1 - 3)  OLANZapine Injectable 2.5 milliGRAM(s) IntraMuscular every 6 hours PRN agitation      CAPILLARY BLOOD GLUCOSE        I&O's Summary    30 Oct 2023 07:01  -  31 Oct 2023 07:00  --------------------------------------------------------  IN: 0 mL / OUT: 500 mL / NET: -500 mL        PHYSICAL EXAM:  Vital Signs Last 24 Hrs  T(C): 36.3 (31 Oct 2023 04:27), Max: 36.4 (30 Oct 2023 21:27)  T(F): 97.3 (31 Oct 2023 04:27), Max: 97.5 (30 Oct 2023 21:27)  HR: 101 (31 Oct 2023 04:27) (99 - 101)  BP: 170/77 (31 Oct 2023 04:27) (170/77 - 173/87)  BP(mean): --  RR: 19 (31 Oct 2023 04:27) (19 - 24)  SpO2: 94% (31 Oct 2023 04:27) (94% - 95%)    Parameters below as of 31 Oct 2023 04:27  Patient On (Oxygen Delivery Method): room air        GEN: male in NAD, appears comfortable, no diaphoresis  EYES: No scleral injection, EOMI  ENTM: neck supple & symmetric without tracheal deviation, moist membranes, no gross hearing impairment, thyroid gland not enlarged  CV: +S1/S2, no m/r/g, no abdominal bruit, no LE edema  RESP: breathing comfortably, no respiratory accessory muscle use, CTAB, no w/r/r  GI: normoactive BS, soft, NTND, no rebounding/guarding, no palpable masses    LABS:                        12.6   10.03 )-----------( 329      ( 31 Oct 2023 06:12 )             39.7     10-31    142  |  107  |  16  ----------------------------<  112<H>  3.9   |  28  |  0.96    Ca    8.7      31 Oct 2023 06:12  Phos  2.4     10-31  Mg     2.2     10-31    TPro  6.4  /  Alb  2.6<L>  /  TBili  0.6  /  DBili  x   /  AST  22  /  ALT  27  /  AlkPhos  81  10-31    PT/INR - ( 31 Oct 2023 06:12 )   PT: 13.9 sec;   INR: 1.19 ratio         PTT - ( 31 Oct 2023 06:12 )  PTT:32.4 sec      Urinalysis Basic - ( 31 Oct 2023 06:12 )    Color: x / Appearance: x / SG: x / pH: x  Gluc: 112 mg/dL / Ketone: x  / Bili: x / Urobili: x   Blood: x / Protein: x / Nitrite: x   Leuk Esterase: x / RBC: x / WBC x   Sq Epi: x / Non Sq Epi: x / Bacteria: x          RADIOLOGY & ADDITIONAL TESTS:  Results Reviewed:   Imaging Personally Reviewed:  Electrocardiogram Personally Reviewed:    COORDINATION OF CARE:  Care Discussed with Consultants/Other Providers [Y/N]:  Prior or Outpatient Records Reviewed [Y/N]:

## 2023-10-31 NOTE — PROGRESS NOTE ADULT - ASSESSMENT
89-year-old male with PMHx of SCC, who presented with left lower extremity wound. Concern for infected LLE ulcer. Wound culture grew pseudomonas. MRI showed no evidence of osteomyelitis underneath posterior ankle wound, but noted to have concern for osteomyelitis of left hallux.    Plan for debridement today per Podiatry. Concern that PICC and long term IV antibiotics might pose more risks than benefits for patient. Discussed this in detail with son, however he is undecided yet if he wants PICC or not. He says he will make decision after debridement.    #LLE infected ulcer  #L hallux osteomyelitis  #Leukocytosis    -continue Zosyn--duration to be determined pending GOC  -send OR tissue cultures  -wound care  -discussed with Dr. Florentino Azar MD  Division of Infectious Diseases   Cell [phone] between 8am and 6pm   After 6pm and weekends please call ID service at [phone].

## 2023-11-01 LAB
ANION GAP SERPL CALC-SCNC: 10 MMOL/L — SIGNIFICANT CHANGE UP (ref 5–17)
ANION GAP SERPL CALC-SCNC: 10 MMOL/L — SIGNIFICANT CHANGE UP (ref 5–17)
BUN SERPL-MCNC: 18 MG/DL — SIGNIFICANT CHANGE UP (ref 7–23)
BUN SERPL-MCNC: 18 MG/DL — SIGNIFICANT CHANGE UP (ref 7–23)
CALCIUM SERPL-MCNC: 9.1 MG/DL — SIGNIFICANT CHANGE UP (ref 8.5–10.1)
CALCIUM SERPL-MCNC: 9.1 MG/DL — SIGNIFICANT CHANGE UP (ref 8.5–10.1)
CHLORIDE SERPL-SCNC: 105 MMOL/L — SIGNIFICANT CHANGE UP (ref 96–108)
CHLORIDE SERPL-SCNC: 105 MMOL/L — SIGNIFICANT CHANGE UP (ref 96–108)
CO2 SERPL-SCNC: 27 MMOL/L — SIGNIFICANT CHANGE UP (ref 22–31)
CO2 SERPL-SCNC: 27 MMOL/L — SIGNIFICANT CHANGE UP (ref 22–31)
CREAT SERPL-MCNC: 1 MG/DL — SIGNIFICANT CHANGE UP (ref 0.5–1.3)
CREAT SERPL-MCNC: 1 MG/DL — SIGNIFICANT CHANGE UP (ref 0.5–1.3)
EGFR: 68 ML/MIN/1.73M2 — SIGNIFICANT CHANGE UP
EGFR: 68 ML/MIN/1.73M2 — SIGNIFICANT CHANGE UP
GLUCOSE SERPL-MCNC: 114 MG/DL — HIGH (ref 70–99)
GLUCOSE SERPL-MCNC: 114 MG/DL — HIGH (ref 70–99)
GRAM STN FLD: SIGNIFICANT CHANGE UP
HCT VFR BLD CALC: 41.7 % — SIGNIFICANT CHANGE UP (ref 39–50)
HCT VFR BLD CALC: 41.7 % — SIGNIFICANT CHANGE UP (ref 39–50)
HGB BLD-MCNC: 13.5 G/DL — SIGNIFICANT CHANGE UP (ref 13–17)
HGB BLD-MCNC: 13.5 G/DL — SIGNIFICANT CHANGE UP (ref 13–17)
MCHC RBC-ENTMCNC: 27.8 PG — SIGNIFICANT CHANGE UP (ref 27–34)
MCHC RBC-ENTMCNC: 27.8 PG — SIGNIFICANT CHANGE UP (ref 27–34)
MCHC RBC-ENTMCNC: 32.4 GM/DL — SIGNIFICANT CHANGE UP (ref 32–36)
MCHC RBC-ENTMCNC: 32.4 GM/DL — SIGNIFICANT CHANGE UP (ref 32–36)
MCV RBC AUTO: 86 FL — SIGNIFICANT CHANGE UP (ref 80–100)
MCV RBC AUTO: 86 FL — SIGNIFICANT CHANGE UP (ref 80–100)
NRBC # BLD: 0 /100 WBCS — SIGNIFICANT CHANGE UP (ref 0–0)
NRBC # BLD: 0 /100 WBCS — SIGNIFICANT CHANGE UP (ref 0–0)
PLATELET # BLD AUTO: 352 K/UL — SIGNIFICANT CHANGE UP (ref 150–400)
PLATELET # BLD AUTO: 352 K/UL — SIGNIFICANT CHANGE UP (ref 150–400)
POTASSIUM SERPL-MCNC: 4.7 MMOL/L — SIGNIFICANT CHANGE UP (ref 3.5–5.3)
POTASSIUM SERPL-MCNC: 4.7 MMOL/L — SIGNIFICANT CHANGE UP (ref 3.5–5.3)
POTASSIUM SERPL-SCNC: 4.7 MMOL/L — SIGNIFICANT CHANGE UP (ref 3.5–5.3)
POTASSIUM SERPL-SCNC: 4.7 MMOL/L — SIGNIFICANT CHANGE UP (ref 3.5–5.3)
RBC # BLD: 4.85 M/UL — SIGNIFICANT CHANGE UP (ref 4.2–5.8)
RBC # BLD: 4.85 M/UL — SIGNIFICANT CHANGE UP (ref 4.2–5.8)
RBC # FLD: 13.7 % — SIGNIFICANT CHANGE UP (ref 10.3–14.5)
RBC # FLD: 13.7 % — SIGNIFICANT CHANGE UP (ref 10.3–14.5)
SODIUM SERPL-SCNC: 142 MMOL/L — SIGNIFICANT CHANGE UP (ref 135–145)
SODIUM SERPL-SCNC: 142 MMOL/L — SIGNIFICANT CHANGE UP (ref 135–145)
SPECIMEN SOURCE: SIGNIFICANT CHANGE UP
WBC # BLD: 17.72 K/UL — HIGH (ref 3.8–10.5)
WBC # BLD: 17.72 K/UL — HIGH (ref 3.8–10.5)
WBC # FLD AUTO: 17.72 K/UL — HIGH (ref 3.8–10.5)
WBC # FLD AUTO: 17.72 K/UL — HIGH (ref 3.8–10.5)

## 2023-11-01 PROCEDURE — 99232 SBSQ HOSP IP/OBS MODERATE 35: CPT

## 2023-11-01 RX ORDER — NYSTATIN 500MM UNIT
500000 POWDER (EA) MISCELLANEOUS DAILY
Refills: 0 | Status: DISCONTINUED | OUTPATIENT
Start: 2023-11-01 | End: 2023-11-03

## 2023-11-01 RX ORDER — LOSARTAN POTASSIUM 100 MG/1
25 TABLET, FILM COATED ORAL DAILY
Refills: 0 | Status: DISCONTINUED | OUTPATIENT
Start: 2023-11-01 | End: 2023-11-03

## 2023-11-01 RX ADMIN — PIPERACILLIN AND TAZOBACTAM 25 GRAM(S): 4; .5 INJECTION, POWDER, LYOPHILIZED, FOR SOLUTION INTRAVENOUS at 08:26

## 2023-11-01 RX ADMIN — TAMSULOSIN HYDROCHLORIDE 0.4 MILLIGRAM(S): 0.4 CAPSULE ORAL at 21:53

## 2023-11-01 RX ADMIN — OLANZAPINE 2.5 MILLIGRAM(S): 15 TABLET, FILM COATED ORAL at 14:06

## 2023-11-01 RX ADMIN — Medication 500000 UNIT(S): at 12:17

## 2023-11-01 RX ADMIN — QUETIAPINE FUMARATE 25 MILLIGRAM(S): 200 TABLET, FILM COATED ORAL at 21:53

## 2023-11-01 RX ADMIN — PIPERACILLIN AND TAZOBACTAM 25 GRAM(S): 4; .5 INJECTION, POWDER, LYOPHILIZED, FOR SOLUTION INTRAVENOUS at 16:40

## 2023-11-01 NOTE — PROGRESS NOTE ADULT - PROBLEM SELECTOR PLAN 3
Likely has component of dementia  - palliative care also assessed - MOLST form updated - DNR and DNI  - Continue Seroquel 25 mg qhs to maintain diurnal cycle  - Avoid benzodiazepines which have anti-cholinergic effects & can make delirium worse

## 2023-11-01 NOTE — PROGRESS NOTE ADULT - PROBLEM SELECTOR PLAN 2
elevated chadsvasc score  - the pt is poor candidate for long term AC - spoke w/ pt's son (HCP) and he agrees that therapeutic AC risk>benefit. will plan for pharmacologic VTE ppx while admitted then will not continue AC at time of dc  - pt does not wish to take any meds - he never took his BP meds historically due to "fatigue" and he will unlikely take any norbert blockers if recommended - family agrees  - rates controlled at present  - spoke w/ cardio about above - conservative tx plan cardio following recommendations appreciated

## 2023-11-01 NOTE — PROGRESS NOTE ADULT - SUBJECTIVE AND OBJECTIVE BOX
NOTE IN PROGRESS     SUBJECTIVE:  89-year-old male seen status post left ankle wound debridement and left hallux bone biopsy (DOS: 10/31/2023).  Patient relates that he is doing well at this time and denies any other complaints.  Denies any fever, chills, nausea, vomiting, chest pain, shortness of breath, or calf pain at this time.    Allergies    No Known Allergies    Intolerances        MEDICATIONS  (STANDING):  enoxaparin Injectable 40 milliGRAM(s) SubCutaneous every 24 hours  losartan 25 milliGRAM(s) Oral daily  nystatin    Suspension [id number] Unit(s) Oral daily  piperacillin/tazobactam IVPB.. 3.375 Gram(s) IV Intermittent every 8 hours  QUEtiapine 25 milliGRAM(s) Oral at bedtime  tamsulosin 0.4 milliGRAM(s) Oral at bedtime    MEDICATIONS  (PRN):  acetaminophen     Tablet .. 650 milliGRAM(s) Oral every 6 hours PRN Temp greater or equal to 38C (100.4F), Mild Pain (1 - 3)  OLANZapine Injectable 2.5 milliGRAM(s) IntraMuscular every 6 hours PRN agitation      Vital Signs Last 24 Hrs  T(C): 36.7 (02 Nov 2023 03:52), Max: 36.9 (01 Nov 2023 14:52)  T(F): 98.1 (02 Nov 2023 03:52), Max: 98.4 (01 Nov 2023 14:52)  HR: 65 (02 Nov 2023 03:52) (60 - 91)  BP: 133/73 (02 Nov 2023 03:52) (133/73 - 173/65)  BP(mean): --  RR: 20 (02 Nov 2023 03:52) (20 - 23)  SpO2: 91% (02 Nov 2023 03:52) (90% - 98%)    Parameters below as of 02 Nov 2023 03:52  Patient On (Oxygen Delivery Method): room air        PHYSICAL EXAM:  Vascular: DP & PT faintly palpable bilaterally, Capillary refill 5 seconds  Neurological: Light touch sensation diminished bilaterally  Musculoskeletal: 4/5 strength in all quadrants bilaterally, AJ & STJ ROM intact  Dermatological:  Left posterior ankle wound down to skin, subcutaneous tissue, fat, tendon, improved erythema noted, no purulence, no proximal streaking, no fluctuance.  Left foot submet head 1 and left plantar hallux wound down to skin, subcutaneous tissue, fat, no purulence, no probe to bone, no periwound erythema, no proximal streaking, no fluctuance.  Left hallux incision with intact sutures, no dehiscence, no proximal streaking, no fluctuance, no purulence                          12.6   8.02  )-----------( 330      ( 02 Nov 2023 08:20 )             39.5       11-02    140  |  108  |  21  ----------------------------<  118<H>  3.7   |  26  |  0.95    Ca    8.8      02 Nov 2023 08:20              Culture - Tissue with Gram Stain (collected 31 Oct 2023 15:19)  Source: .Tissue Other, LEFT GREAT TOE DISTAL P[HALANX  Gram Stain (01 Nov 2023 00:33):    No polymorphonuclear cells seen per low power field    No organisms seen per oil power field  Preliminary Report (01 Nov 2023 16:29):    No growth to date.    Culture - Tissue with Gram Stain (collected 31 Oct 2023 15:19)  Source: .Tissue Other, LEFT GREATTOE PROXIMAL PHALANX  Gram Stain (01 Nov 2023 00:35):    No polymorphonuclear cells seen per low power field    No organisms seen per oil power field  Preliminary Report (01 Nov 2023 16:27):    No growth to date.    Culture - Tissue with Gram Stain (collected 31 Oct 2023 15:19)  Source: .Tissue Other, LEFT ANKLE TISSUE C&amp;S  Gram Stain (31 Oct 2023 23:24):    No polymorphonuclear leukocytes seen per low power field    No organisms seen per oil power field  Preliminary Report (01 Nov 2023 16:31):    No growth to date.

## 2023-11-01 NOTE — PROGRESS NOTE ADULT - TIME BILLING
Reviewing chart notes and data, face to face time counseling the patient, communicating with Dr. Azar ID - awaiting pathology to guide abx management poor candidate for PICC, coordinating care with SW/CM at [facility].

## 2023-11-01 NOTE — PROGRESS NOTE ADULT - PROBLEM SELECTOR PLAN 1
LLE wound, significant drainage, circumferentially affected the ankle  - ID consult appreciated  - Wound culture with few pseudomonas which is pansensitive  - Currently on Zosyn  - elevated inflammatory markers noted  - Podiatry consult noted  - Left US: "Elevated velocities in the peroneal artery suggestive of a stenosis. No evidence of occlusion. Diffuse monophasic flow below the knee suggestive of underlying disease."  - Vascular consulted and no further intervention given comorbidities  - MRI left foot/ankle appreciated: possible osteo.   s/p surgical debridement 10/31  - PT rec ANGIE  - Possible PICC and prolonged abx so will follow up with ID

## 2023-11-01 NOTE — SWALLOW BEDSIDE ASSESSMENT ADULT - ADDITIONAL RECOMMENDATIONS
2. This service to follow-up as schedule permits for diet advancement. 3. Medical team further advised to reconsult this department with any change in medical status and/or observed change in tolerance of recommended PO diet.

## 2023-11-01 NOTE — PROGRESS NOTE ADULT - PROBLEM SELECTOR PLAN 1
Patient examined and evaluated.  Surgical site dressed with adaptic, aquacel, and dry, sterile dressing.  Patient is to be full weight bearing bilaterally as tolerated.  Surgical pathology and cultures pending at this time.  Antibiotics as per ID recommendations.

## 2023-11-01 NOTE — PROGRESS NOTE ADULT - PROBLEM SELECTOR PLAN 7
DVT ppx: Lovenox 40mg qd DVT ppx: Lovenox 40mg qd      discussed with son Hilario aware and in agreement with above. Awaiting pathology from OR to guide abx management / duration.

## 2023-11-01 NOTE — SOCIAL WORK PROGRESS NOTE - NSSWPROGRESSNOTE_GEN_ALL_CORE
Called son and spoke to MD. No pic line at this time/IV anti. Hiring help. Told him he needs 24 hour. Will refer to Hospice when MD indicates.

## 2023-11-01 NOTE — SWALLOW BEDSIDE ASSESSMENT ADULT - COMMENTS
As per Hospitalist note dated 11/1/23 "89-year-old male with PMHx of SCC presents to the emergency department with son with report of left lower extremity wound with exposed bone/tendon."    CXR 10/23/23 "IMPRESSION: Small bilateral lower lobe infiltrates/atelectasis."    Patient visited at bedside for clinical swallow evaluation. Patient presents as awake and alert, pleasantly confused, however able to follow 1-step directives and make basic wants/needs known.

## 2023-11-01 NOTE — PROGRESS NOTE ADULT - SUBJECTIVE AND OBJECTIVE BOX
[facility] -- Genny Mora Pannella, Patel, Savella, Goodger, Cohen  Office # [phone]    Follow Up:  Cardiac Arrythmia     Subjective/Observations:  seen and examined, awake, confused in bed, on enhanced supervision, unable to provide meaningful information at this time, NAD.  Tolerating room air.    REVIEW OF SYSTEMS: All other review of systems is negative unless indicated above  PAST MEDICAL & SURGICAL HISTORY:  No pertinent past medical history      H/O hernia repair        MEDICATIONS  (STANDING):  enoxaparin Injectable 40 milliGRAM(s) SubCutaneous every 24 hours  piperacillin/tazobactam IVPB.. 3.375 Gram(s) IV Intermittent every 8 hours  QUEtiapine 25 milliGRAM(s) Oral at bedtime  tamsulosin 0.4 milliGRAM(s) Oral at bedtime    MEDICATIONS  (PRN):  acetaminophen     Tablet .. 650 milliGRAM(s) Oral every 6 hours PRN Temp greater or equal to 38C (100.4F), Mild Pain (1 - 3)  OLANZapine Injectable 2.5 milliGRAM(s) IntraMuscular every 6 hours PRN agitation    Allergies    No Known Allergies    Intolerances      Vital Signs Last 24 Hrs  T(C): 36.9 (01 Nov 2023 06:25), Max: 36.9 (31 Oct 2023 12:24)  T(F): 98.5 (01 Nov 2023 06:25), Max: 98.5 (01 Nov 2023 06:25)  HR: 105 (01 Nov 2023 06:25) (77 - 105)  BP: 133/75 (01 Nov 2023 06:25) (129/72 - 184/90)  BP(mean): --  RR: 24 (01 Nov 2023 06:25) (16 - 24)  SpO2: 91% (01 Nov 2023 06:25) (91% - 99%)    Parameters below as of 01 Nov 2023 06:25  Patient On (Oxygen Delivery Method): room air      I&O's Summary    31 Oct 2023 07:01  -  01 Nov 2023 07:00  --------------------------------------------------------  IN: 100 mL / OUT: 800 mL / NET: -700 mL      Weight (kg): 72.6 (10-31 @ 14:00)    TELE: Not on telemetry   PHYSICAL EXAM:  Constitutional: NAD, awake and alert  HEENT: Moist Mucous Membranes, Anicteric  Pulmonary: Non-labored, breath sounds are clear bilaterally, No wheezing, rales or rhonchi  Cardiovascular: IRRR, S1 and S2, No murmurs, rubs, gallops or clicks  Gastrointestinal: Bowel Sounds present, soft, nontender.   Lymph: No peripheral edema. No lymphadenopathy.  Skin: LLE dressing CDI   Psych:  Mood & affect appropriate  LABS: All Labs Reviewed:                        13.5   17.72 )-----------( 352      ( 01 Nov 2023 07:55 )             41.7                         12.6   10.03 )-----------( 329      ( 31 Oct 2023 06:12 )             39.7     31 Oct 2023 06:12    142    |  107    |  16     ----------------------------<  112    3.9     |  28     |  0.96     Ca    8.7        31 Oct 2023 06:12  Phos  2.4       31 Oct 2023 06:12  Mg     2.2       31 Oct 2023 06:12    TPro  6.4    /  Alb  2.6    /  TBili  0.6    /  DBili  x      /  AST  22     /  ALT  27     /  AlkPhos  81     31 Oct 2023 06:12    PT/INR - ( 31 Oct 2023 06:12 )   PT: 13.9 sec;   INR: 1.19 ratio         PTT - ( 31 Oct 2023 06:12 )  PTT:32.4 sec      12 Lead ECG:   Ventricular Rate 93 BPM    Atrial Rate 93 BPM    P-R Interval 184 ms    QRS Duration 112 ms    Q-T Interval 388 ms    QTC Calculation(Bazett) 482 ms    P Axis 65 degrees    R Axis -58 degrees    T Axis 67 degrees    Diagnosis Line Sinus rhythm withpremature supraventricular complexes and with occasional premature ventricular complexes  Left anterior fascicular block  Moderate voltage criteria for LVH, may be normal variant ( R in aVL , Pindall product )  Possible Anterior infarct (cited on or before 25-OCT-2023)  Abnormal ECG  Confirmed by doug Harrell ([id number]) on 10/26/2023 3:27:22 PM (10-25-23 @ 14:00)

## 2023-11-01 NOTE — CASE MANAGEMENT PROGRESS NOTE - NSCMPROGRESSNOTE_GEN_ALL_CORE
medical team, SW and CM met to speak regarding the DC plan. The patient cultures are pending and Infectious Disease wants to wait for the cultures. Hospice vs. [facility] vs. Home care.

## 2023-11-01 NOTE — PROGRESS NOTE ADULT - ASSESSMENT
Left posterior ankle wound down to skin, subcutaneous tissue, fat, tendon, bone  Left foot submet head 1 and left plantar hallux wound down to skin, subcutaneous tissue, fat  Status post left ankle wound debridement and left hallux bone biopsy (DOS: 10/31/2023)

## 2023-11-01 NOTE — PROGRESS NOTE ADULT - ASSESSMENT
89-year-old male with PMHx of SCC, HTN , spinal stenosis presents to the emergency department with son with report of left lower extremity infection concern for OM now with concern for afib on his EKG.     Abnormal EKG, possible Afib, post optimization   - s/p selective debridement of wound (10/31) tolerated well from CV POV, podiatry following     - EKG: irregular rhythm, was felt to be possible Afib vs SR with mobitz type I and PACs  - not a candidate for AC, (as per discussion with family and his advanced age)  - does not wish to take AV norbert blockers due to fatigue?  - No sign of volume overload, non orthopneic  - No O2 requirement laying flat    - BP improved, now mostly controlled 120'- 150's, with two readings in 180's likely 2/2 to pain  - Can resume home Losartan   - Monitor and replete Lytes. Keep K > 4 and Mg > 2    - Vascular consulted and no further intervention given comorbidities  - MRI left foot/ankle: possible osteo.   - on abx per ID     - Will continue to follow.    Keri [facility]  Nurse Practitioner - Cardiology   call TEAMS

## 2023-11-01 NOTE — SWALLOW BEDSIDE ASSESSMENT ADULT - ASR SWALLOW RECOMMEND DIAG
Objective testing is NOT indicated given functional swallow for Easy to Chew Solids and Mildly-Thick Liquids and presence of overt s/s of penetration/aspiration with thin liquids

## 2023-11-01 NOTE — PROGRESS NOTE ADULT - SUBJECTIVE AND OBJECTIVE BOX
[facility]  INFECTIOUS DISEASES - [address], [place] [address]  Tel: [phone]     Fax: [phone]  =======================================================    ALICIA HERNANDEZ [id number]    Follow up: No fevers. Denies any pain.    Allergies:  No Known Allergies      Antibiotics:  acetaminophen     Tablet .. 650 milliGRAM(s) Oral every 6 hours PRN  enoxaparin Injectable 40 milliGRAM(s) SubCutaneous every 24 hours  losartan 25 milliGRAM(s) Oral daily  nystatin    Suspension [id number] Unit(s) Oral daily  OLANZapine Injectable 2.5 milliGRAM(s) IntraMuscular every 6 hours PRN  piperacillin/tazobactam IVPB.. 3.375 Gram(s) IV Intermittent every 8 hours  QUEtiapine 25 milliGRAM(s) Oral at bedtime  tamsulosin 0.4 milliGRAM(s) Oral at bedtime       REVIEW OF SYSTEMS:  Limited 2/2 mental status, as per HPI     Physical Exam:  ICU Vital Signs Last 24 Hrs  T(C): 36.9 (01 Nov 2023 14:52), Max: 36.9 (01 Nov 2023 06:25)  T(F): 98.4 (01 Nov 2023 14:52), Max: 98.5 (01 Nov 2023 06:25)  HR: 60 (01 Nov 2023 14:52) (60 - 105)  BP: 173/65 (01 Nov 2023 14:52) (129/72 - 184/90)  BP(mean): --  ABP: --  ABP(mean): --  RR: 20 (01 Nov 2023 14:52) (16 - 24)  SpO2: 97% (01 Nov 2023 14:52) (90% - 99%)    O2 Parameters below as of 01 Nov 2023 14:52  Patient On (Oxygen Delivery Method): room air      GEN: NAD  HEENT: normocephalic and atraumatic.   NECK: Supple.   LUNGS: Normal respiratory effort  HEART: Regular rate and rhythm   ABDOMEN: Soft, nontender, and nondistended.    EXTREMITIES: No leg edema.  NEUROLOGIC: Answering some simple questions  SKIN: (+) large ulceration on posterior L lower calf  (+) L hallux ulcer      Labs:  11-01    142  |  105  |  18  ----------------------------<  114<H>  4.7   |  27  |  1.00    Ca    9.1      01 Nov 2023 07:55  Phos  2.4     10-31  Mg     2.2     10-31    TPro  6.4  /  Alb  2.6<L>  /  TBili  0.6  /  DBili  x   /  AST  22  /  ALT  27  /  AlkPhos  81  10-31                          13.5   17.72 )-----------( 352      ( 01 Nov 2023 07:55 )             41.7     PT/INR - ( 31 Oct 2023 06:12 )   PT: 13.9 sec;   INR: 1.19 ratio         PTT - ( 31 Oct 2023 06:12 )  PTT:32.4 sec  Urinalysis Basic - ( 01 Nov 2023 07:55 )    Color: x / Appearance: x / SG: x / pH: x  Gluc: 114 mg/dL / Ketone: x  / Bili: x / Urobili: x   Blood: x / Protein: x / Nitrite: x   Leuk Esterase: x / RBC: x / WBC x   Sq Epi: x / Non Sq Epi: x / Bacteria: x      LIVER FUNCTIONS - ( 31 Oct 2023 06:12 )  Alb: 2.6 g/dL / Pro: 6.4 g/dL / ALK PHOS: 81 U/L / ALT: 27 U/L / AST: 22 U/L / GGT: x             RECENT CULTURES:  10-31 @ 15:19 .Tissue Other, LEFT ANKLE TISSUE C&S       No polymorphonuclear leukocytes seen per low power field  No organisms seen per oil power field      10-24 @ 07:00 Wound Wound Pseudomonas aeruginosa    Few Pseudomonas aeruginosa        10-23 @ 21:50 .Blood Blood-Peripheral     No growth at 5 days              All imaging and data are reviewed.

## 2023-11-01 NOTE — PROGRESS NOTE ADULT - SUBJECTIVE AND OBJECTIVE BOX
Patient is a 89y old  Male who presents with a chief complaint of Left leg wound (01 Nov 2023 09:49)      INTERVAL HPI/OVERNIGHT EVENTS: Patient seen and examined at bedside. No overnight events.  s/p OR debridement 10/31 tolerated procedure well    MEDICATIONS  (STANDING):  enoxaparin Injectable 40 milliGRAM(s) SubCutaneous every 24 hours  losartan 25 milliGRAM(s) Oral daily  nystatin    Suspension [id number] Unit(s) Oral daily  piperacillin/tazobactam IVPB.. 3.375 Gram(s) IV Intermittent every 8 hours  QUEtiapine 25 milliGRAM(s) Oral at bedtime  tamsulosin 0.4 milliGRAM(s) Oral at bedtime    MEDICATIONS  (PRN):  acetaminophen     Tablet .. 650 milliGRAM(s) Oral every 6 hours PRN Temp greater or equal to 38C (100.4F), Mild Pain (1 - 3)  OLANZapine Injectable 2.5 milliGRAM(s) IntraMuscular every 6 hours PRN agitation      Allergies    No Known Allergies    Intolerances        REVIEW OF SYSTEMS:  CONSTITUTIONAL: No fever or chills  CARDIOVASCULAR: No chest pain, palpitations  GASTROINTESTINAL: No abd pain, nausea, vomiting, or diarrhea      Vital Signs Last 24 Hrs  T(C): 36.9 (01 Nov 2023 06:25), Max: 36.9 (31 Oct 2023 14:10)  T(F): 98.5 (01 Nov 2023 06:25), Max: 98.5 (01 Nov 2023 06:25)  HR: 105 (01 Nov 2023 06:25) (77 - 105)  BP: 133/75 (01 Nov 2023 06:25) (129/72 - 184/90)  BP(mean): --  RR: 24 (01 Nov 2023 06:25) (16 - 24)  SpO2: 91% (01 Nov 2023 06:25) (91% - 99%)    Parameters below as of 01 Nov 2023 06:25  Patient On (Oxygen Delivery Method): room air      I&O's Summary    31 Oct 2023 07:01  -  01 Nov 2023 07:00  --------------------------------------------------------  IN: 100 mL / OUT: 800 mL / NET: -700 mL      BMI (kg/m2): 23 (10-31-23 @ 14:00)    PHYSICAL EXAM:  GENERAL: NAD  HEENT:  AT/NC, anicteric, moist mucous membranes, EOMI, PERRL, no lid-lag, conjunctiva and sclera clear  CHEST/LUNG:  CTA b/l, no rales, wheezes, or rhonchi,  normal respiratory effort, no intercostal retractions  HEART:  RRR, S1, S2, no murmurs; no pitting edema  ABDOMEN:  BS+, soft, nontender, nondistended  MSK/EXTREMITIES: faint/palpable peripheral pulses, no clubbing or cyanosis; dressing c/d/i  NERVOUS SYSTEM: answers questions and follows commands appropriately, A&Ox3 grossly moves all extremities   PSYCH: Appropriate affect, Alert & Awake; fair judgement      LABS: Personally reviewed  CBC                        13.5   17.72 )-----------( 352      ( 01 Nov 2023 07:55 )             41.7     CMP  11-01    142  |  105  |  18  ----------------------------<  114  4.7   |  27  |  1.00    Ca    9.1      01 Nov 2023 07:55  Phos  2.4     10-31  Mg     2.2     10-31    TPro  6.4  /  Alb  2.6  /  TBili  0.6  /  DBili  x   /  AST  22  /  ALT  27  /  AlkPhos  81  10-31          PT/INR - ( 31 Oct 2023 06:12 )   PT: 13.9 sec;   INR: 1.19 ratio         PTT - ( 31 Oct 2023 06:12 )  PTT:32.4 sec                            Urinalysis Basic - ( 01 Nov 2023 07:55 )    Color: x / Appearance: x / SG: x / pH: x  Gluc: 114 mg/dL / Ketone: x  / Bili: x / Urobili: x   Blood: x / Protein: x / Nitrite: x   Leuk Esterase: x / RBC: x / WBC x   Sq Epi: x / Non Sq Epi: x / Bacteria: x        Culture - Tissue with Gram Stain (collected 31 Oct 2023 15:19)  Source: .Tissue Other, LEFT GREAT TOE DISTAL P[HALANX  Gram Stain (01 Nov 2023 00:33):    No polymorphonuclear cells seen per low power field    No organisms seen per oil power field    Culture - Tissue with Gram Stain (collected 31 Oct 2023 15:19)  Source: .Tissue Other, LEFT GREATTOE PROXIMAL PHALANX  Gram Stain (01 Nov 2023 00:35):    No polymorphonuclear cells seen per low power field    No organisms seen per oil power field    Culture - Tissue with Gram Stain (collected 31 Oct 2023 15:19)  Source: .Tissue Other, LEFT ANKLE TISSUE C&amp;S  Gram Stain (31 Oct 2023 23:24):    No polymorphonuclear leukocytes seen per low power field    No organisms seen per oil power field            Culture - Tissue with Gram Stain (collected 10-31-23 @ 15:19)  Source: .Tissue Other, LEFT GREAT TOE DISTAL P[HALANX  Gram Stain (11-01-23 @ 00:33):    No polymorphonuclear cells seen per low power field    No organisms seen per oil power field    Culture - Tissue with Gram Stain (collected 10-31-23 @ 15:19)  Source: .Tissue Other, LEFT GREATTOE PROXIMAL PHALANX  Gram Stain (11-01-23 @ 00:35):    No polymorphonuclear cells seen per low power field    No organisms seen per oil power field    Culture - Tissue with Gram Stain (collected 10-31-23 @ 15:19)  Source: .Tissue Other, LEFT ANKLE TISSUE C&amp;S  Gram Stain (10-31-23 @ 23:24):    No polymorphonuclear leukocytes seen per low power field    No organisms seen per oil power field        RADIOLOGY & ADDITIONAL TESTS: Personally reviewed.     Consultant(s) Notes Reviewed:  [x] YES  [ ] NO   Discussed with CALVIN/ERA, RN

## 2023-11-01 NOTE — PROGRESS NOTE ADULT - PROBLEM SELECTOR PROBLEM 2
New onset atrial fibrillation

## 2023-11-01 NOTE — PROGRESS NOTE ADULT - ASSESSMENT
89-year-old male with PMHx of SCC, who presented with left lower extremity wound. Concern for infected LLE ulcer. Wound culture grew pseudomonas. MRI showed no evidence of osteomyelitis underneath posterior ankle wound, but noted to have concern for osteomyelitis of left hallux.    S/p LLE wound debridement and L hallux bone biopsy yesterday 10/31. Path and OR cultures pending. No fevers but noted to have increase in WBC today. Leukocytosis probably reactive post op but will monitor.    Concern that PICC and long term IV antibiotics might pose more risks than benefits for patient. Discussed this in detail with son, however he is undecided yet if he wants PICC or not. He said he will make decision after debridement whether to go for PICC or just do oral antibiotics,    #LLE infected ulcer  #L hallux osteomyelitis  #Leukocytosis    -continue Zosyn--duration to be determined pending GOC  -follow up OR tissue cultures, path  -monitor WBC  -wound care  -discussed with Dr. Haja Azar MD  Division of Infectious Diseases   Cell [phone] between 8am and 6pm   After 6pm and weekends please call ID service at [phone].

## 2023-11-01 NOTE — PROGRESS NOTE ADULT - ASSESSMENT
Patient:   ANIA BRADLEY            MRN: [id number]            FIN: [id number]              Age:   72 years     Sex:  FEMALE     :  47   Associated Diagnoses:   None   Author:   ERIN, JAMEL       Indication DVT GI bleeding pulmonary embolism    IVC angiogram and IVC filter placement    Patient was brought to the cardiac catheterization laboratory she was prepped and draped I access the right femoral vein with a 6 Iranian long sheath that came with the IVC filter and I did angiography that that shows a good landmark with the renal veins arising from the inferior vena cava at the level of first lumbar vertebra the size of the IVC seems to be reasonable to implant the filter that was implanted under fluoroscopy and was deployed  at the level of the second and third lumbar vertebra with no complication status was obtained with local hand pressure this was removable IVC filter please see a separate report for the details and more the number   89-year-old male with PMHx of SCC presents to the emergency department with son with report of left lower extremity wound with exposed bone/tendon.

## 2023-11-02 LAB
ANION GAP SERPL CALC-SCNC: 6 MMOL/L — SIGNIFICANT CHANGE UP (ref 5–17)
ANION GAP SERPL CALC-SCNC: 6 MMOL/L — SIGNIFICANT CHANGE UP (ref 5–17)
BUN SERPL-MCNC: 21 MG/DL — SIGNIFICANT CHANGE UP (ref 7–23)
BUN SERPL-MCNC: 21 MG/DL — SIGNIFICANT CHANGE UP (ref 7–23)
CALCIUM SERPL-MCNC: 8.8 MG/DL — SIGNIFICANT CHANGE UP (ref 8.5–10.1)
CALCIUM SERPL-MCNC: 8.8 MG/DL — SIGNIFICANT CHANGE UP (ref 8.5–10.1)
CHLORIDE SERPL-SCNC: 108 MMOL/L — SIGNIFICANT CHANGE UP (ref 96–108)
CHLORIDE SERPL-SCNC: 108 MMOL/L — SIGNIFICANT CHANGE UP (ref 96–108)
CO2 SERPL-SCNC: 26 MMOL/L — SIGNIFICANT CHANGE UP (ref 22–31)
CO2 SERPL-SCNC: 26 MMOL/L — SIGNIFICANT CHANGE UP (ref 22–31)
CREAT SERPL-MCNC: 0.95 MG/DL — SIGNIFICANT CHANGE UP (ref 0.5–1.3)
CREAT SERPL-MCNC: 0.95 MG/DL — SIGNIFICANT CHANGE UP (ref 0.5–1.3)
EGFR: 72 ML/MIN/1.73M2 — SIGNIFICANT CHANGE UP
EGFR: 72 ML/MIN/1.73M2 — SIGNIFICANT CHANGE UP
GLUCOSE SERPL-MCNC: 118 MG/DL — HIGH (ref 70–99)
GLUCOSE SERPL-MCNC: 118 MG/DL — HIGH (ref 70–99)
HCT VFR BLD CALC: 39.5 % — SIGNIFICANT CHANGE UP (ref 39–50)
HCT VFR BLD CALC: 39.5 % — SIGNIFICANT CHANGE UP (ref 39–50)
HGB BLD-MCNC: 12.6 G/DL — LOW (ref 13–17)
HGB BLD-MCNC: 12.6 G/DL — LOW (ref 13–17)
MCHC RBC-ENTMCNC: 27.5 PG — SIGNIFICANT CHANGE UP (ref 27–34)
MCHC RBC-ENTMCNC: 27.5 PG — SIGNIFICANT CHANGE UP (ref 27–34)
MCHC RBC-ENTMCNC: 31.9 GM/DL — LOW (ref 32–36)
MCHC RBC-ENTMCNC: 31.9 GM/DL — LOW (ref 32–36)
MCV RBC AUTO: 86.2 FL — SIGNIFICANT CHANGE UP (ref 80–100)
MCV RBC AUTO: 86.2 FL — SIGNIFICANT CHANGE UP (ref 80–100)
NRBC # BLD: 0 /100 WBCS — SIGNIFICANT CHANGE UP (ref 0–0)
NRBC # BLD: 0 /100 WBCS — SIGNIFICANT CHANGE UP (ref 0–0)
PLATELET # BLD AUTO: 330 K/UL — SIGNIFICANT CHANGE UP (ref 150–400)
PLATELET # BLD AUTO: 330 K/UL — SIGNIFICANT CHANGE UP (ref 150–400)
POTASSIUM SERPL-MCNC: 3.7 MMOL/L — SIGNIFICANT CHANGE UP (ref 3.5–5.3)
POTASSIUM SERPL-MCNC: 3.7 MMOL/L — SIGNIFICANT CHANGE UP (ref 3.5–5.3)
POTASSIUM SERPL-SCNC: 3.7 MMOL/L — SIGNIFICANT CHANGE UP (ref 3.5–5.3)
POTASSIUM SERPL-SCNC: 3.7 MMOL/L — SIGNIFICANT CHANGE UP (ref 3.5–5.3)
RBC # BLD: 4.58 M/UL — SIGNIFICANT CHANGE UP (ref 4.2–5.8)
RBC # BLD: 4.58 M/UL — SIGNIFICANT CHANGE UP (ref 4.2–5.8)
RBC # FLD: 14 % — SIGNIFICANT CHANGE UP (ref 10.3–14.5)
RBC # FLD: 14 % — SIGNIFICANT CHANGE UP (ref 10.3–14.5)
SODIUM SERPL-SCNC: 140 MMOL/L — SIGNIFICANT CHANGE UP (ref 135–145)
SODIUM SERPL-SCNC: 140 MMOL/L — SIGNIFICANT CHANGE UP (ref 135–145)
WBC # BLD: 8.02 K/UL — SIGNIFICANT CHANGE UP (ref 3.8–10.5)
WBC # BLD: 8.02 K/UL — SIGNIFICANT CHANGE UP (ref 3.8–10.5)
WBC # FLD AUTO: 8.02 K/UL — SIGNIFICANT CHANGE UP (ref 3.8–10.5)
WBC # FLD AUTO: 8.02 K/UL — SIGNIFICANT CHANGE UP (ref 3.8–10.5)

## 2023-11-02 PROCEDURE — 99232 SBSQ HOSP IP/OBS MODERATE 35: CPT

## 2023-11-02 RX ADMIN — Medication 500000 UNIT(S): at 12:01

## 2023-11-02 RX ADMIN — PIPERACILLIN AND TAZOBACTAM 25 GRAM(S): 4; .5 INJECTION, POWDER, LYOPHILIZED, FOR SOLUTION INTRAVENOUS at 16:08

## 2023-11-02 RX ADMIN — PIPERACILLIN AND TAZOBACTAM 25 GRAM(S): 4; .5 INJECTION, POWDER, LYOPHILIZED, FOR SOLUTION INTRAVENOUS at 08:24

## 2023-11-02 RX ADMIN — TAMSULOSIN HYDROCHLORIDE 0.4 MILLIGRAM(S): 0.4 CAPSULE ORAL at 22:02

## 2023-11-02 RX ADMIN — QUETIAPINE FUMARATE 25 MILLIGRAM(S): 200 TABLET, FILM COATED ORAL at 22:03

## 2023-11-02 RX ADMIN — ENOXAPARIN SODIUM 40 MILLIGRAM(S): 100 INJECTION SUBCUTANEOUS at 00:21

## 2023-11-02 RX ADMIN — PIPERACILLIN AND TAZOBACTAM 25 GRAM(S): 4; .5 INJECTION, POWDER, LYOPHILIZED, FOR SOLUTION INTRAVENOUS at 00:21

## 2023-11-02 RX ADMIN — LOSARTAN POTASSIUM 25 MILLIGRAM(S): 100 TABLET, FILM COATED ORAL at 05:50

## 2023-11-02 NOTE — PROGRESS NOTE ADULT - SUBJECTIVE AND OBJECTIVE BOX
[facility] -- Morgan Mccarthy,  Genny, Segun Kunz Savella, Goodger  Office # [phone]    Follow Up:  Cardiac Arrythmia     Subjective/Observations: No events overnight resting comfortably in bed. Pt is confuse/ on 1:1. Unable to obtain meaningful ROS. appears NAD.       REVIEW OF SYSTEMS: All other review of systems is negative unless indicated above  PAST MEDICAL & SURGICAL HISTORY:  No pertinent past medical history      H/O hernia repair        MEDICATIONS  (STANDING):  enoxaparin Injectable 40 milliGRAM(s) SubCutaneous every 24 hours  losartan 25 milliGRAM(s) Oral daily  nystatin    Suspension 304952 Unit(s) Oral daily  piperacillin/tazobactam IVPB.. 3.375 Gram(s) IV Intermittent every 8 hours  QUEtiapine 25 milliGRAM(s) Oral at bedtime  tamsulosin 0.4 milliGRAM(s) Oral at bedtime    MEDICATIONS  (PRN):  acetaminophen     Tablet .. 650 milliGRAM(s) Oral every 6 hours PRN Temp greater or equal to 38C (100.4F), Mild Pain (1 - 3)  OLANZapine Injectable 2.5 milliGRAM(s) IntraMuscular every 6 hours PRN agitation    Allergies    No Known Allergies    Intolerances      Vital Signs Last 24 Hrs  T(C): 36.7 (02 Nov 2023 03:52), Max: 36.9 (01 Nov 2023 14:52)  T(F): 98.1 (02 Nov 2023 03:52), Max: 98.4 (01 Nov 2023 14:52)  HR: 65 (02 Nov 2023 03:52) (60 - 91)  BP: 133/73 (02 Nov 2023 03:52) (133/73 - 173/65)  BP(mean): --  RR: 20 (02 Nov 2023 03:52) (20 - 23)  SpO2: 91% (02 Nov 2023 03:52) (90% - 98%)    Parameters below as of 02 Nov 2023 03:52  Patient On (Oxygen Delivery Method): room air      I&O's Summary    01 Nov 2023 07:01  -  02 Nov 2023 07:00  --------------------------------------------------------  IN: 300 mL / OUT: 0 mL / NET: 300 mL      Weight (kg): 77.5 (11-01 @ 20:23)      TELE: Not on telemetry   PHYSICAL EXAM:  Constitutional: NAD, awake and alert  HEENT: Moist Mucous Membranes, Anicteric  Pulmonary: Non-labored, breath sounds are clear bilaterally, No wheezing, rales or rhonchi  Cardiovascular: IRRR, S1 and S2, No murmurs, rubs, gallops or clicks  Gastrointestinal: Bowel Sounds present, soft, nontender.   Lymph: No peripheral edema. No lymphadenopathy.  Skin: LLE dressing CDI   Psych:  Mood & affect appropriate    LABS: All Labs Reviewed:                        12.6   8.02  )-----------( 330      ( 02 Nov 2023 08:20 )             39.5                         13.5   17.72 )-----------( 352      ( 01 Nov 2023 07:55 )             41.7                         12.6   10.03 )-----------( 329      ( 31 Oct 2023 06:12 )             39.7     02 Nov 2023 08:20    140    |  108    |  21     ----------------------------<  118    3.7     |  26     |  0.95   01 Nov 2023 07:55    142    |  105    |  18     ----------------------------<  114    4.7     |  27     |  1.00   31 Oct 2023 06:12    142    |  107    |  16     ----------------------------<  112    3.9     |  28     |  0.96     Ca    8.8        02 Nov 2023 08:20  Ca    9.1        01 Nov 2023 07:55  Ca    8.7        31 Oct 2023 06:12  Phos  2.4       31 Oct 2023 06:12  Mg     2.2       31 Oct 2023 06:12    TPro  6.4    /  Alb  2.6    /  TBili  0.6    /  DBili  x      /  AST  22     /  ALT  27     /  AlkPhos  81     31 Oct 2023 06:12          12 Lead ECG:   Ventricular Rate 93 BPM    Atrial Rate 93 BPM    P-R Interval 184 ms    QRS Duration 112 ms    Q-T Interval 388 ms    QTC Calculation(Bazett) 482 ms    P Axis 65 degrees    R Axis -58 degrees    T Axis 67 degrees    Diagnosis Line Sinus rhythm withpremature supraventricular complexes and with occasional premature ventricular complexes  Left anterior fascicular block  Moderate voltage criteria for LVH, may be normal variant ( R in aVL , [place] product )  Possible Anterior infarct (cited on or before 25-OCT-2023)  Abnormal ECG  Confirmed by doug Harrell ([id number]) on 10/26/2023 3:27:22 PM (10-25-23 @ 14:00)        [facility] -- Morgan Mccarthy,  Genny, Segun Kunz Savella, Goodger  Office # [phone]    Follow Up:  Cardiac Arrythmia     Subjective/Observations: No events overnight resting comfortably in bed. Pt is confuse/ on 1:1. Unable to obtain meaningful ROS. appears NAD.       REVIEW OF SYSTEMS: All other review of systems is negative unless indicated above  PAST MEDICAL & SURGICAL HISTORY:  No pertinent past medical history      H/O hernia repair        MEDICATIONS  (STANDING):  enoxaparin Injectable 40 milliGRAM(s) SubCutaneous every 24 hours  losartan 25 milliGRAM(s) Oral daily  nystatin    Suspension 120001 Unit(s) Oral daily  piperacillin/tazobactam IVPB.. 3.375 Gram(s) IV Intermittent every 8 hours  QUEtiapine 25 milliGRAM(s) Oral at bedtime  tamsulosin 0.4 milliGRAM(s) Oral at bedtime    MEDICATIONS  (PRN):  acetaminophen     Tablet .. 650 milliGRAM(s) Oral every 6 hours PRN Temp greater or equal to 38C (100.4F), Mild Pain (1 - 3)  OLANZapine Injectable 2.5 milliGRAM(s) IntraMuscular every 6 hours PRN agitation    Allergies    No Known Allergies    Intolerances      Vital Signs Last 24 Hrs  T(C): 36.7 (02 Nov 2023 03:52), Max: 36.9 (01 Nov 2023 14:52)  T(F): 98.1 (02 Nov 2023 03:52), Max: 98.4 (01 Nov 2023 14:52)  HR: 65 (02 Nov 2023 03:52) (60 - 91)  BP: 133/73 (02 Nov 2023 03:52) (133/73 - 173/65)  BP(mean): --  RR: 20 (02 Nov 2023 03:52) (20 - 23)  SpO2: 91% (02 Nov 2023 03:52) (90% - 98%)    Parameters below as of 02 Nov 2023 03:52  Patient On (Oxygen Delivery Method): room air      I&O's Summary    01 Nov 2023 07:01  -  02 Nov 2023 07:00  --------------------------------------------------------  IN: 300 mL / OUT: 0 mL / NET: 300 mL      Weight (kg): 77.5 (11-01 @ 20:23)      TELE: Not on telemetry   PHYSICAL EXAM:  Constitutional: NAD, awake and alert  HEENT: Moist Mucous Membranes, Anicteric  Pulmonary: Non-labored, breath sounds are clear bilaterally, No wheezing, rales or rhonchi  Cardiovascular: RRR, S1 and S2, No murmurs, rubs, gallops or clicks  Gastrointestinal: Bowel Sounds present, soft, nontender.   Lymph: No peripheral edema. No lymphadenopathy.  Skin: LLE dressing CDI   Psych:  Mood & affect appropriate    LABS: All Labs Reviewed:                        12.6   8.02  )-----------( 330      ( 02 Nov 2023 08:20 )             39.5                         13.5   17.72 )-----------( 352      ( 01 Nov 2023 07:55 )             41.7                         12.6   10.03 )-----------( 329      ( 31 Oct 2023 06:12 )             39.7     02 Nov 2023 08:20    140    |  108    |  21     ----------------------------<  118    3.7     |  26     |  0.95   01 Nov 2023 07:55    142    |  105    |  18     ----------------------------<  114    4.7     |  27     |  1.00   31 Oct 2023 06:12    142    |  107    |  16     ----------------------------<  112    3.9     |  28     |  0.96     Ca    8.8        02 Nov 2023 08:20  Ca    9.1        01 Nov 2023 07:55  Ca    8.7        31 Oct 2023 06:12  Phos  2.4       31 Oct 2023 06:12  Mg     2.2       31 Oct 2023 06:12    TPro  6.4    /  Alb  2.6    /  TBili  0.6    /  DBili  x      /  AST  22     /  ALT  27     /  AlkPhos  81     31 Oct 2023 06:12          12 Lead ECG:   Ventricular Rate 93 BPM    Atrial Rate 93 BPM    P-R Interval 184 ms    QRS Duration 112 ms    Q-T Interval 388 ms    QTC Calculation(Bazett) 482 ms    P Axis 65 degrees    R Axis -58 degrees    T Axis 67 degrees    Diagnosis Line Sinus rhythm withpremature supraventricular complexes and with occasional premature ventricular complexes  Left anterior fascicular block  Moderate voltage criteria for LVH, may be normal variant ( R in aVL , [place] product )  Possible Anterior infarct (cited on or before 25-OCT-2023)  Abnormal ECG  Confirmed by doug Harrell ([id number]) on 10/26/2023 3:27:22 PM (10-25-23 @ 14:00)

## 2023-11-02 NOTE — PROGRESS NOTE ADULT - PROBLEM SELECTOR PLAN 7
DVT ppx: Lovenox 40mg qd      discussed with son Hilario aware and in agreement with above. Awaiting pathology from OR to guide abx management / duration. Considering hospice.

## 2023-11-02 NOTE — SOCIAL WORK PROGRESS NOTE - NSSWPROGRESSNOTE_GEN_ALL_CORE
Son is requesting rehab. Provided list and educated on discharge process and policy Social Work to remain available

## 2023-11-02 NOTE — PROGRESS NOTE ADULT - ASSESSMENT
89-year-old male with PMHx of SCC, HTN , spinal stenosis presents to the emergency department with son with report of left lower extremity infection concern for OM now with concern for afib on his EKG.     Abnormal EKG, possible Afib, post optimization   - s/p selective debridement of wound (10/31) tolerated well from CV POV, podiatry following     - EKG: iSR with PACs  - no need for AC    - No sign of volume overload, non orthopneic  - No O2 requirement laying flat    - BP controlled   - Continue Losartan     - Vascular consulted and no further intervention given comorbidities  - MRI left foot/ankle: possible osteo.   - on abx per ID     - Monitor and replete lytes, keep K>4, Mg>2.  - Will continue to follow.    Kassidy Antonio NP  Nurse Practitioner- Cardiology   Call TEAMS 89-year-old male with PMHx of SCC, HTN , spinal stenosis presents to the emergency department with son with report of left lower extremity infection concern for OM now with concern for afib on his EKG.     Abnormal EKG, possible Afib, post optimization   - s/p selective debridement of wound (10/31) tolerated well from CV POV, podiatry following     - EKG: SR with PACs  - no need for AC    - No sign of volume overload, non orthopneic  - No O2 requirement laying flat    - BP controlled   - Continue Losartan     - Vascular consulted and no further intervention given comorbidities  - MRI left foot/ankle: possible osteo.   - on abx per ID     - Monitor and replete lytes, keep K>4, Mg>2.  - Will continue to follow.    Kassidy Antonio NP  Nurse Practitioner- Cardiology   Call TEAMS

## 2023-11-02 NOTE — PROGRESS NOTE ADULT - SUBJECTIVE AND OBJECTIVE BOX
Patient is a 89y old  Male who presents with a chief complaint of Left leg wound (02 Nov 2023 09:44)      INTERVAL HPI/OVERNIGHT EVENTS: Patient seen and examined at bedside. No overnight events.    MEDICATIONS  (STANDING):  enoxaparin Injectable 40 milliGRAM(s) SubCutaneous every 24 hours  losartan 25 milliGRAM(s) Oral daily  nystatin    Suspension 233844 Unit(s) Oral daily  piperacillin/tazobactam IVPB.. 3.375 Gram(s) IV Intermittent every 8 hours  QUEtiapine 25 milliGRAM(s) Oral at bedtime  tamsulosin 0.4 milliGRAM(s) Oral at bedtime    MEDICATIONS  (PRN):  acetaminophen     Tablet .. 650 milliGRAM(s) Oral every 6 hours PRN Temp greater or equal to 38C (100.4F), Mild Pain (1 - 3)  OLANZapine Injectable 2.5 milliGRAM(s) IntraMuscular every 6 hours PRN agitation      Allergies    No Known Allergies    Intolerances        REVIEW OF SYSTEMS:  CONSTITUTIONAL: No fever or chills  CARDIOVASCULAR: No chest pain, palpitations    Vital Signs Last 24 Hrs  T(C): 36.5 (02 Nov 2023 12:55), Max: 36.9 (01 Nov 2023 14:52)  T(F): 97.7 (02 Nov 2023 12:55), Max: 98.4 (01 Nov 2023 14:52)  HR: 80 (02 Nov 2023 12:55) (60 - 91)  BP: 131/70 (02 Nov 2023 12:55) (131/70 - 173/65)  BP(mean): --  RR: 17 (02 Nov 2023 12:55) (17 - 23)  SpO2: 95% (02 Nov 2023 12:55) (90% - 98%)    Parameters below as of 02 Nov 2023 12:55  Patient On (Oxygen Delivery Method): room air      I&O's Summary    01 Nov 2023 07:01  -  02 Nov 2023 07:00  --------------------------------------------------------  IN: 300 mL / OUT: 0 mL / NET: 300 mL      BMI (kg/m2): 24.5 (11-01-23 @ 20:23)    PHYSICAL EXAM:  GENERAL: NAD  HEENT:  AT/NC, anicteric, moist mucous membranes, EOMI, PERRL, no lid-lag, conjunctiva and sclera clear  CHEST/LUNG:  CTA b/l, no rales, wheezes, or rhonchi,  normal respiratory effort, no intercostal retractions  HEART:  RRR, S1, S2, no murmurs; no pitting edema  ABDOMEN:  BS+, soft, nontender, nondistended  MSK/EXTREMITIES: faint/palpable peripheral pulses, no clubbing or cyanosis; dressing c/d/i  NERVOUS SYSTEM: answers questions and follows commands appropriately, A&Ox2-3, forgetful, grossly moves all extremities   PSYCH: Appropriate affect, Alert & Awake; fair judgement        LABS: Personally reviewed  CBC                        12.6   8.02  )-----------( 330      ( 02 Nov 2023 08:20 )             39.5     CMP  11-02    140  |  108  |  21  ----------------------------<  118  3.7   |  26  |  0.95    Ca    8.8      02 Nov 2023 08:20  Phos  2.4     10-31  Mg     2.2     10-31    TPro  6.4  /  Alb  2.6  /  TBili  0.6  /  DBili  x   /  AST  22  /  ALT  27  /  AlkPhos  81  10-31          PT/INR - ( 31 Oct 2023 06:12 )   PT: 13.9 sec;   INR: 1.19 ratio         PTT - ( 31 Oct 2023 06:12 )  PTT:32.4 sec                            Urinalysis Basic - ( 02 Nov 2023 08:20 )    Color: x / Appearance: x / SG: x / pH: x  Gluc: 118 mg/dL / Ketone: x  / Bili: x / Urobili: x   Blood: x / Protein: x / Nitrite: x   Leuk Esterase: x / RBC: x / WBC x   Sq Epi: x / Non Sq Epi: x / Bacteria: x        Culture - Tissue with Gram Stain (collected 31 Oct 2023 15:19)  Source: .Tissue Other, LEFT GREAT TOE DISTAL P[HALANX  Gram Stain (01 Nov 2023 00:33):    No polymorphonuclear cells seen per low power field    No organisms seen per oil power field  Preliminary Report (01 Nov 2023 16:29):    No growth to date.    Culture - Tissue with Gram Stain (collected 31 Oct 2023 15:19)  Source: .Tissue Other, LEFT GREATTOE PROXIMAL PHALANX  Gram Stain (01 Nov 2023 00:35):    No polymorphonuclear cells seen per low power field    No organisms seen per oil power field  Preliminary Report (01 Nov 2023 16:27):    No growth to date.    Culture - Tissue with Gram Stain (collected 31 Oct 2023 15:19)  Source: .Tissue Other, LEFT ANKLE TISSUE C&amp;S  Gram Stain (31 Oct 2023 23:24):    No polymorphonuclear leukocytes seen per low power field    No organisms seen per oil power field  Preliminary Report (01 Nov 2023 16:31):    No growth to date.            Culture - Tissue with Gram Stain (collected 10-31-23 @ 15:19)  Source: .Tissue Other, LEFT GREAT TOE DISTAL P[HALANX  Gram Stain (11-01-23 @ 00:33):    No polymorphonuclear cells seen per low power field    No organisms seen per oil power field  Preliminary Report (11-01-23 @ 16:29):    No growth to date.    Culture - Tissue with Gram Stain (collected 10-31-23 @ 15:19)  Source: .Tissue Other, LEFT GREATTOE PROXIMAL PHALANX  Gram Stain (11-01-23 @ 00:35):    No polymorphonuclear cells seen per low power field    No organisms seen per oil power field  Preliminary Report (11-01-23 @ 16:27):    No growth to date.    Culture - Tissue with Gram Stain (collected 10-31-23 @ 15:19)  Source: .Tissue Other, LEFT ANKLE TISSUE C&amp;S  Gram Stain (10-31-23 @ 23:24):    No polymorphonuclear leukocytes seen per low power field    No organisms seen per oil power field  Preliminary Report (11-01-23 @ 16:31):    No growth to date.        RADIOLOGY & ADDITIONAL TESTS: Personally reviewed.     Consultant(s) Notes Reviewed:  [x] YES  [ ] NO   Discussed with CALVIN/ERA, RN

## 2023-11-02 NOTE — PROGRESS NOTE ADULT - PROBLEM SELECTOR PLAN 1
LLE wound, significant drainage, circumferentially affected the ankle  - ID consult appreciated  - Wound culture with few pseudomonas which is pansensitive  - Currently on Zosyn  - elevated inflammatory markers noted  - Podiatry consult noted  - Left US: "Elevated velocities in the peroneal artery suggestive of a stenosis. No evidence of occlusion. Diffuse monophasic flow below the knee suggestive of underlying disease."  - Vascular consulted and no further intervention given comorbidities  - MRI left foot/ankle appreciated: possible osteo.   s/p surgical debridement 10/31 awaiting cultures to guide abx management   - PT rec ANGIE  -Son considering hospice

## 2023-11-03 ENCOUNTER — TRANSCRIPTION ENCOUNTER (OUTPATIENT)
Age: 88
End: 2023-11-03

## 2023-11-03 VITALS
TEMPERATURE: 98 F | DIASTOLIC BLOOD PRESSURE: 60 MMHG | RESPIRATION RATE: 21 BRPM | HEART RATE: 79 BPM | SYSTOLIC BLOOD PRESSURE: 133 MMHG | OXYGEN SATURATION: 94 %

## 2023-11-03 PROCEDURE — 88304 TISSUE EXAM BY PATHOLOGIST: CPT

## 2023-11-03 PROCEDURE — 83036 HEMOGLOBIN GLYCOSYLATED A1C: CPT

## 2023-11-03 PROCEDURE — 80048 BASIC METABOLIC PNL TOTAL CA: CPT

## 2023-11-03 PROCEDURE — 84100 ASSAY OF PHOSPHORUS: CPT

## 2023-11-03 PROCEDURE — A9579: CPT

## 2023-11-03 PROCEDURE — 73630 X-RAY EXAM OF FOOT: CPT

## 2023-11-03 PROCEDURE — 86140 C-REACTIVE PROTEIN: CPT

## 2023-11-03 PROCEDURE — 97530 THERAPEUTIC ACTIVITIES: CPT

## 2023-11-03 PROCEDURE — 85610 PROTHROMBIN TIME: CPT

## 2023-11-03 PROCEDURE — 85730 THROMBOPLASTIN TIME PARTIAL: CPT

## 2023-11-03 PROCEDURE — 93005 ELECTROCARDIOGRAM TRACING: CPT

## 2023-11-03 PROCEDURE — 87070 CULTURE OTHR SPECIMN AEROBIC: CPT

## 2023-11-03 PROCEDURE — 92526 ORAL FUNCTION THERAPY: CPT

## 2023-11-03 PROCEDURE — 84145 PROCALCITONIN (PCT): CPT

## 2023-11-03 PROCEDURE — 80202 ASSAY OF VANCOMYCIN: CPT

## 2023-11-03 PROCEDURE — 87075 CULTR BACTERIA EXCEPT BLOOD: CPT

## 2023-11-03 PROCEDURE — 85025 COMPLETE CBC W/AUTO DIFF WBC: CPT

## 2023-11-03 PROCEDURE — 96374 THER/PROPH/DIAG INJ IV PUSH: CPT

## 2023-11-03 PROCEDURE — 86900 BLOOD TYPING SEROLOGIC ABO: CPT

## 2023-11-03 PROCEDURE — 97116 GAIT TRAINING THERAPY: CPT

## 2023-11-03 PROCEDURE — 73590 X-RAY EXAM OF LOWER LEG: CPT

## 2023-11-03 PROCEDURE — 99285 EMERGENCY DEPT VISIT HI MDM: CPT | Mod: 25

## 2023-11-03 PROCEDURE — 85652 RBC SED RATE AUTOMATED: CPT

## 2023-11-03 PROCEDURE — 93010 ELECTROCARDIOGRAM REPORT: CPT

## 2023-11-03 PROCEDURE — 80053 COMPREHEN METABOLIC PANEL: CPT

## 2023-11-03 PROCEDURE — 97162 PT EVAL MOD COMPLEX 30 MIN: CPT

## 2023-11-03 PROCEDURE — 99232 SBSQ HOSP IP/OBS MODERATE 35: CPT

## 2023-11-03 PROCEDURE — 83605 ASSAY OF LACTIC ACID: CPT

## 2023-11-03 PROCEDURE — 36415 COLL VENOUS BLD VENIPUNCTURE: CPT

## 2023-11-03 PROCEDURE — 93926 LOWER EXTREMITY STUDY: CPT

## 2023-11-03 PROCEDURE — 71045 X-RAY EXAM CHEST 1 VIEW: CPT

## 2023-11-03 PROCEDURE — 88311 DECALCIFY TISSUE: CPT

## 2023-11-03 PROCEDURE — 83735 ASSAY OF MAGNESIUM: CPT

## 2023-11-03 PROCEDURE — 92610 EVALUATE SWALLOWING FUNCTION: CPT

## 2023-11-03 PROCEDURE — 85027 COMPLETE CBC AUTOMATED: CPT

## 2023-11-03 PROCEDURE — 73723 MRI JOINT LWR EXTR W/O&W/DYE: CPT

## 2023-11-03 PROCEDURE — 87040 BLOOD CULTURE FOR BACTERIA: CPT

## 2023-11-03 PROCEDURE — 86901 BLOOD TYPING SEROLOGIC RH(D): CPT

## 2023-11-03 PROCEDURE — 86850 RBC ANTIBODY SCREEN: CPT

## 2023-11-03 PROCEDURE — 87186 SC STD MICRODIL/AGAR DIL: CPT

## 2023-11-03 PROCEDURE — 73720 MRI LWR EXTREMITY W/O&W/DYE: CPT

## 2023-11-03 RX ORDER — QUETIAPINE FUMARATE 200 MG/1
1 TABLET, FILM COATED ORAL
Qty: 0 | Refills: 0 | DISCHARGE
Start: 2023-11-03

## 2023-11-03 RX ORDER — LOSARTAN POTASSIUM 100 MG/1
1 TABLET, FILM COATED ORAL
Qty: 0 | Refills: 0 | DISCHARGE
Start: 2023-11-03

## 2023-11-03 RX ORDER — TAMSULOSIN HYDROCHLORIDE 0.4 MG/1
1 CAPSULE ORAL
Qty: 0 | Refills: 0 | DISCHARGE
Start: 2023-11-03

## 2023-11-03 RX ORDER — ACETAMINOPHEN 500 MG
2 TABLET ORAL
Qty: 0 | Refills: 0 | DISCHARGE
Start: 2023-11-03

## 2023-11-03 RX ORDER — LEVOFLOXACIN 5 MG/ML
1 INJECTION, SOLUTION INTRAVENOUS
Qty: 0 | Refills: 0 | DISCHARGE

## 2023-11-03 RX ADMIN — OLANZAPINE 2.5 MILLIGRAM(S): 15 TABLET, FILM COATED ORAL at 13:24

## 2023-11-03 RX ADMIN — PIPERACILLIN AND TAZOBACTAM 25 GRAM(S): 4; .5 INJECTION, POWDER, LYOPHILIZED, FOR SOLUTION INTRAVENOUS at 09:27

## 2023-11-03 RX ADMIN — Medication 500000 UNIT(S): at 12:07

## 2023-11-03 RX ADMIN — LOSARTAN POTASSIUM 25 MILLIGRAM(S): 100 TABLET, FILM COATED ORAL at 06:19

## 2023-11-03 RX ADMIN — ENOXAPARIN SODIUM 40 MILLIGRAM(S): 100 INJECTION SUBCUTANEOUS at 00:35

## 2023-11-03 RX ADMIN — PIPERACILLIN AND TAZOBACTAM 25 GRAM(S): 4; .5 INJECTION, POWDER, LYOPHILIZED, FOR SOLUTION INTRAVENOUS at 00:35

## 2023-11-03 NOTE — DISCHARGE NOTE NURSING/CASE MANAGEMENT/SOCIAL WORK - NSPROMEDSBROUGHTTOHOSP_GEN_A_NUR
[facility]  Office: [address], DO, Maximo Jose Angel, DO, Berto Cj, DO, Jordin Mao, DO, Helen Solano MD, Ricki Kikrland MD, Quan Reilly MD, Michelet Shelton MD, Shelia Morales MD, Haseeb Segura MD, Jen Juarez MD, Lizabeth Dill, DO, Kaushik Anthony, DO, Lizy Guerra MD,  Haider Valentin DO, Todd You MD, Adelita Ron MD, Tyree Hall MD, Ariana Troncoso, DO, Cesar Dhillon MD, Landy Bee MD, Sai Mclain CNP, [facility], CNP, Emily Levy, CNP, Manisha Posada, CNP, Darwin Brooks, CNP, Latasha Hameed, CNP, STEVO WinstonC, Virgie Garnett, DNP, Richard El, CNP, Melissa Cosme, CNP, Giancarlo Stack, CNP, Mathieu Correa, CNS, Shay Garcia, DNP, Jennifer Serra, CNP, Bambi Sams, CNP, Kelley Lynne, CNP         [facility]    HISTORY AND PHYSICAL EXAMINATION            Date:   4/25/2022  Patient name:  Javier Gaitan  Date of admission:  4/25/2022  1:46 PM  MRN:   [id number]  Account:  [de-identified]  YOB: 1949  PCP:    Isamar Peacock MD  Room:   06/06  Code Status:    Prior    Chief Complaint:     Chief Complaint   Patient presents with    Shortness of Breath       History Obtained From:     patient, electronic medical record    History of Present Illness:     Javier Gaitan is a 67 y.o. Non- / non  female who presents with Shortness of Breath   and is admitted to the hospital for the management of COPD exacerbation ([facility] Ms. Michaela Martinez is a 67year old female who presented for evaluation of increased SOB over the last several days. She states she does have home oxygen for prn usage that she has been using continuously. She states she has has associated cough and chest pain that is worse when laying down. She denies any fever or chills. She does have a medical history that includes COPD, DM, HTN and CAD.  She also complains of GERD and states she has been out of her home Prilosec. She states she has had a falling out with her PCP and is scheduled to see a new provider in early May. She is compliant with home medications. She is a former smoker (quit 2000), denies use of alcohol or recreational drugs. Past Medical History:     Past Medical History:   Diagnosis Date    Abdominal bloating 1/26/2021    Adenomatous polyp of ascending colon 1/26/2021    Allergic rhinitis     Anxiety 7/17/2013    Arthritis     Asthma     Atrial fibrillation ([facility]     Back pain     NERVE/DR. GRACIA    Benign hypertension with CKD (chronic kidney disease) stage III ([facility])     CAD (coronary artery disease) 3/21/2013    Caffeine use     2 coffee/day    CHF (congestive heart failure) ([facility])     Chronic kidney disease     CKD (chronic kidney disease) stage 3, GFR 30-59 ml/min ([facility])     COPD (chronic obstructive pulmonary disease) ([facility])     emphysema    Degeneration of lumbar or lumbosacral intervertebral disc     Depression     DM (diabetes mellitus) ([facility])     Emphysema of lung ([facility])     Gastritis     GERD (gastroesophageal reflux disease)     Hearing loss     Hematuria     Hiatal hernia     HTN (hypertension), benign 6/28/2014    Hypertriglyceridemia     Incontinence of urine 9/25/2013    Irritable bowel syndrome with both constipation and diarrhea 1/26/2021    Knee arthropathy     Lumbosacral spondylosis without myelopathy 9/29/2014    Migraine headache     DR. GRACIA    Mumps     Neuropathy     Obesity     On home oxygen therapy     2 L PER NC  HS AND PRN    HIGINIO on CPAP     Otitis media 1-26-15    Secondary diabetes mellitus with stage 3 chronic kidney disease (GFR 30-59) ([facility])     Stroke ([facility]      mini stroke.     Tinnitus     Type 2 diabetes mellitus without complication ([facility])     Type II or unspecified type diabetes mellitus without mention of complication, not stated as uncontrolled     UTI (lower urinary tract infection)     Varicose vein Past Surgical History:     Past Surgical History:   Procedure Laterality Date    ABLATION OF DYSRHYTHMIC FOCUS  2000    CARPAL TUNNEL RELEASE Right     CATARACT REMOVAL WITH IMPLANT Bilateral     CHOLECYSTECTOMY  2007    COLONOSCOPY      COLONOSCOPY  04/10/2017    tubular adenomo polyp , mod. sigmoid diverticulosis, min. int. hemorrhoids    COLONOSCOPY N/A 3/2/2021    COLONOSCOPY WITH BIOPSY performed by [facility]heng MD at [facility] 8  9/25/2013    DILATION AND CURETTAGE  1979    EYE SURGERY      laser    INCONTINENCE SURGERY      Percutaneous nerve stimulation Dr Rojas Gtz Nov 2013     INSERTABLE CARDIAC MONITOR  12/04/2015    [employer] REVEAL LINQ MODEL #[id number] MRI CONDTIONAL OK 3T. IMMEDIATELY POST IMPLANT    OTHER SURGICAL HISTORY  09/10/15    removal of interstim    DE COLSC FLX W/REMOVAL LESION BY HOT BX FORCEPS N/A 4/10/2017    COLONOSCOPY POLYPECTOMY HOT BIOPSY performed by Herlinda Kee MD at [address] TYMPANOPLASTY      TYMPANOPLASTY      TYMPANOSTOMY TUBE PLACEMENT  08/21/2017    UPPER GASTROINTESTINAL ENDOSCOPY  03/2016    Dr Carmen Lagunas N/A 3/2/2021    EGD BIOPSY performed by Amanda Zheng MD at [address] [place] [address]        Medications Prior to Admission:     Prior to Admission medications    Medication Sig Start Date End Date Taking? Authorizing Provider   FEROSUL 325 (65 Fe) MG tablet TAKE 1 TAB BY MOUTH IN THE MORNING 4/20/22   Jamari Garay MD   ONETOUCH ULTRA strip TEST TWO (2) TO THREE (3) TIMES A DAY & AS NEEDED FOR SYMPTOMS OF IRREGULAR BLOOD GLUCOSE 4/11/22   Jamari Garay MD   oxyCODONE-acetaminophen (PERCOCET) 5-325 MG per tablet Take 1 tablet by mouth See Admin Instructions for 30 days. Intended supply: 30 days.  1 tab 3-4 times a day prn 4/9/22 5/9/22  Toshia Kaba, APRN - CNP   pantoprazole (PROTONIX) 40 MG tablet TAKE 1 TABLET BY MOUTH TWICE A DAY 3/31/22   Jamari Garay MD   oxybutynin (DITROPAN-XL) 5 MG extended release tablet TAKE 1 TABLET BY MOUTH DAILY 3/30/22   Reatha Course, MD   fenofibrate (TRIGLIDE) 160 MG tablet TAKE 1 TABLET BY MOUTH DAILY 3/30/22   Reatha Course, MD   insulin aspart (NOVOLOG FLEXPEN) 100 UNIT/ML injection pen IF<139 NO INSULIN; 140-199-2 UN;200-249-4 UN;250-299-6 UN;300-349-8 UN;350-400=10 UN;ABOVE 400-12 UNIT(S) 3/30/22   Reatha Course, MD   magnesium oxide (MAG-OX) 400 MG tablet  2/21/22   Historical Provider, MD   zoster recombinant adjuvanted vaccine (SHINGRIX) 50 MCG/0.5ML SUSR injection 50 MCG IM then repeat 2-6 months. 3/1/22 3/1/23  Reatha Course, MD   insulin glargine (BASAGLAR KWIKPEN) 100 UNIT/ML injection pen Inject 55 Units into the skin 2 times daily 3/1/22   Reatha Course, MD   Dulaglutide (TRULICITY) 2.40 RP/7.5QI SOPN Inject 0.75 mg into the skin every 7 days If pen needle is needed, please request 3/1/22   Reatha MD Alexia   Continuous Blood Gluc Sensor (DEXCOM G6 SENSOR) MISC Use daily for checking blood sugars 3/1/22   Reatha Course, MD   losartan (COZAAR) 25 MG tablet TAKE 1 TABLET BY MOUTH ONCE DAILY 2/21/22 Reatha Course, MD   ibuprofen (ADVIL;MOTRIN) 600 MG tablet Take 1 tablet by mouth 3 times daily as needed for Pain 2/1/22   Lupe Charltono, MD   carvedilol (COREG) 3.125 MG tablet TAKE 1 TAB BY MOUTH TWICE A DAY ( IN THE MORNING AND BEDTIME ) 1/21/22 Reatha Course, MD   ketoconazole (NIZORAL) 2 % cream Apply topically  2 times a day.  12/29/21 Reatha Course, MD   citalopram (CELEXA) 20 MG tablet TAKE 1 TABLET BY MOUTH DAILY NEEDS TO DECREASE THE CELEXA TO 20 MG DUE TO SIDE EFFECTS ON THE HEART 12/8/21 Reatha Course, MD   aspirin 81 MG EC tablet TAKE 1 TABLET BY MOUTH ONCE DAILY 12/8/21 Reatha MD Alexia   docusate sodium (COLACE) 100 MG capsule TAKE 1 CAPSULE BY MOUTH TWICE A DAY 12/8/21   Reatha MD Alexia   Multiple Vitamins-Minerals (CERTAVITE/ANTIOXIDANTS) TABS TAKE 1 TABLET BY MOUTH ONCE DAILY 12/8/21 Reatha MD Alexia isosorbide dinitrate (ISORDIL) 30 MG tablet TAKE 1 TABLET BY MOUTH ONCE DAILY 12/8/21   Ad Esteban MD   vitamin B-12 (CYANOCOBALAMIN) 100 MCG tablet take half a tablet BY MOUTH ONCE DAILY 11/24/21   Rmao Clemons MD   furosemide (LASIX) 20 MG tablet TAKE 1 TABLET BY MOUTH ONCE DAILY AS NEEDED 11/24/21   Ramo Clemons MD   Icosapent Ethyl (VASCEPA) 1 g CAPS capsule TAKE 1 CAPSULE BY MOUTH TWICE A DAY 11/24/21   Anaya Hill MD   dicyclomine (BENTYL) 10 MG capsule TAKE 1 CAPSULE BY MOUTH TWICE A DAY AS NEEDED FOR ABDOMINAL SPASMS 11/24/21   Ramo Clemons MD   topiramate (TOPAMAX) 100 MG tablet TAKE 1 TABLET BY MOUTH NIGHTLY  10/25/21   Nolan Carvalho MD   clopidogrel (PLAVIX) 75 MG tablet TAKE 1 TAB BY MOUTH ONCE A DAY ( IN THE MORNING ) -THIS MEDICINE MAY BE TAKENWITH OR WITHOUT FOOD  10/25/21   Ad Esteban MD   metFORMIN (GLUCOPHAGE) 1000 MG tablet TAKE 1 TAB BY MOUTH TWICE A DAY ( IN THE MORNING AND BEDTIME )  10/1/21   Ad Esteban MD   blood glucose test strips (ONETOUCH ULTRA) strip TEST TWO (2) TO THREE (3) TIMES A DAY & AS NEEDED FOR SYMPTOMS OF IRREGULAR BLOOD GLUCOSE 8/23/21   Ad Esteban MD   Blood Glucose Monitoring Suppl (ONE TOUCH ULTRA 2) w/Device KIT  2/10/21   Historical Provider, MD   ONETOUCH ULTRA strip TEST TWO (2) TO THREE (3) TIMES A DAY& AS NEEDED  3/5/21   Ad Esteban MD   atorvastatin (LIPITOR) 40 MG tablet Take 1 tablet by mouth daily 2/28/21   Ad Esteban MD   BiPAP Machine MISC repair/replace Bipap maintain 18/9CMH2O, Cflex=3,mask,tubing,filters,headgear,heated humidifier,all supplies PRN 1/28/21   Historical Provider, MD   Psyllium (METAMUCIL PO) Take by mouth 3 times daily Takes powder    Historical Provider, MD   blood glucose test strips (TRUE METRIX BLOOD GLUCOSE TEST) strip THREE TIMES A DAY 1/21/21   Ad Esteban MD   albuterol (PROVENTIL) (2.5 MG/3ML) 0.083% nebulizer solution Take 3 mLs by nebulization every 6 hours as needed for Wheezing 11/3/20   Mahesh Mayorga MD   ULTICARE MINI PEN NEEDLES 31G X 6 MM MISC USE AS DIRECTED  8/14/20   Ronaldo Ocasio MD   gabapentin (NEURONTIN) 300 MG capsule Take 1 capsule by mouth 3 times daily for 30 days. Patient taking differently: Take 300 mg by mouth 2 times daily. 7/30/20 3/9/22  Adrienne Reef, APRN - CNP   nystatin (MYCOSTATIN) 017758 UNIT/GM powder Apply 3 times daily. 6/25/20   Ronaldo Ocasio MD   lidocaine (LMX) 4 % cream Apply topically every 8 hrs as needed for pain 11/11/19   Ronaldo Ocasio MD   Lift Chair MISC by Does not apply route 9/24/19   MD Mackenzie Sandoval. Devices (ADJUST BATH/SHOWER SEAT/BACK) MISC Use daily for shower 9/24/19   Ronaldo Ocasio MD   Alcohol Swabs (B-D SINGLE USE SWABS REGULAR) PADS THREE TIMES A DAY 12/12/18   Ronaldo Ocasio MD   nitroGLYCERIN (NITROSTAT) 0.4 MG SL tablet 1 under the tongue as needed for angina, may repeat q5mins for up three doses 8/28/18   Historical Provider, MD   Blood Glucose Monitoring Suppl HARMEET Use daily to check BS 3/27/18   Ronaldo Ocasio MD   ipratropium-albuterol (DUONEB) 0.5-2.5 (3) MG/3ML SOLN nebulizer solution Inhale 3 mLs into the lungs every 4 hours 2/6/18   Pamella Hankins MD   Melatonin 10 MG TABS Take 10 mg by mouth nightly 10/19/17   Ronaldo Ocasio MD   Compression Stockings MISC by Does not apply route Pressure between 20 - 25 . 9/11/17   Ronaldo Ocasio MD   SYMBICORT 160-4.5 MCG/ACT AERO   2 puffs As needed for sob 5/28/15   Historical Provider, MD   OXYGEN Inhale 3 L into the lungs     Historical Provider, MD        Allergies:     Robitussin [guaifenesin], Codeine, Compazine [prochlorperazine maleate], Iodides, Morphine, Moxifloxacin, Reglan [metoclopramide], Avelox [moxifloxacin hcl in nacl], Cipro xr, and Sulfa antibiotics    Social History:     Tobacco:    reports that she quit smoking about 22 years ago. Her smoking use included cigarettes. She has a 123.00 pack-year smoking history.  She has never used smokeless tobacco.  Alcohol:      reports no history of alcohol use. Drug Use:  reports no history of drug use. Family History:     Family History   Problem Relation Age of Onset    Diabetes Mother     Heart Disease Mother     Stomach Cancer Father     Diabetes Maternal Grandmother         also aunts and uncles (maternal)    Emphysema Sister        Review of Systems:     Positive and Negative as described in HPI. Review of Systems   Constitutional: Negative for chills, fatigue and fever. HENT: Negative for congestion, sinus pressure and sneezing. Eyes: Negative for photophobia, redness and visual disturbance. Respiratory: Positive for cough, chest tightness, shortness of breath and wheezing. Cardiovascular: Positive for leg swelling. Gastrointestinal: Negative for constipation, diarrhea, nausea and vomiting. Endocrine: Negative for polydipsia, polyphagia and polyuria. Genitourinary: Negative for difficulty urinating, flank pain, frequency and urgency. Musculoskeletal: Positive for myalgias. Negative for back pain. Skin: Negative for pallor, rash and wound. Allergic/Immunologic: Negative for environmental allergies, food allergies and immunocompromised state. Neurological: Negative for seizures, weakness and headaches. Hematological: Negative for adenopathy. Does not bruise/bleed easily. Psychiatric/Behavioral: Negative for confusion, self-injury, sleep disturbance and suicidal ideas. Depression       Physical Exam:   BP (!) 148/72   Pulse 96   Temp 98 °F (36.7 °C) (Oral)   Resp 14   Ht 5' 2\" (1.575 m)   Wt 234 lb (106.1 kg)   LMP  (LMP Unknown)   SpO2 91%   BMI 42.80 kg/m²   Temp (24hrs), Av °F (36.7 °C), Min:98 °F (36.7 °C), Max:98 °F (36.7 °C)    No results for input(s): POCGLU in the last 72 hours. No intake or output data in the 24 hours ending 22 1721    Physical Exam  Vitals reviewed. Constitutional:       Appearance: She is morbidly obese. She is not ill-appearing or toxic-appearing. Interventions: Nasal cannula in place. HENT:      Mouth/Throat:      Mouth: Mucous membranes are moist.      Pharynx: Oropharynx is clear. Eyes:      Pupils: Pupils are equal, round, and reactive to light. Cardiovascular:      Rate and Rhythm: Normal rate and regular rhythm. Pulses: Normal pulses. Pulmonary:      Effort: Pulmonary effort is normal.      Breath sounds: Normal breath sounds. Decreased air movement present. Comments: Diminished throughout. Robert rales, rhonchi or wheezing. Abdominal:      General: Abdomen is protuberant. Bowel sounds are normal. There is no distension. Palpations: Abdomen is soft. Tenderness: There is no abdominal tenderness. Musculoskeletal:      Cervical back: Normal range of motion. Right lower le+ Pitting Edema present. Left lower le+ Pitting Edema present. Skin:     General: Skin is warm and dry. Capillary Refill: Capillary refill takes less than 2 seconds. Neurological:      General: No focal deficit present. Mental Status: She is alert. Psychiatric:         Attention and Perception: Attention normal.         Mood and Affect: Mood normal.         Speech: Speech normal.         Behavior: Behavior normal. Behavior is cooperative.          Cognition and Memory: Cognition normal.         Investigations:      Laboratory Testing:  Recent Results (from the past 24 hour(s))   D-Dimer, Quantitative    Collection Time: 22  1:56 PM   Result Value Ref Range    D-Dimer, Quant 0.64 mg/L FEU   Lipase    Collection Time: 22  1:56 PM   Result Value Ref Range    Lipase 52 13 - 60 U/L   Hepatic Function Panel    Collection Time: 22  1:56 PM   Result Value Ref Range    Albumin 4.1 3.5 - 5.2 g/dL    Alkaline Phosphatase 76 35 - 104 U/L    ALT 52 (H) 5 - 33 U/L    AST 58 (H) <32 U/L    Total Bilirubin 0.32 0.3 - 1.2 mg/dL    Bilirubin, Direct <0.08 <0.31 mg/dL    Bilirubin, Indirect Can not be calculated 0.00 - 1.00 mg/dL    Total Protein 7.6 6.4 - 8.3 g/dL    Albumin/Globulin Ratio 1.2 1.0 - 2.5   CBC    Collection Time: 04/25/22  1:56 PM   Result Value Ref Range    WBC 7.8 3.5 - 11.3 k/uL    RBC 4.77 3.95 - 5.11 m/uL    Hemoglobin 14.8 11.9 - 15.1 g/dL    Hematocrit 44.5 36.3 - 47.1 %    MCV 93.3 82.6 - 102.9 fL    MCH 31.0 25.2 - 33.5 pg    MCHC 33.3 28.4 - 34.8 g/dL    RDW 12.4 11.8 - 14.4 %    Platelets 376 539 - 241 k/uL    MPV 12.3 8.1 - 13.5 fL    NRBC Automated 0.0 0.0 per 100 WBC   Differential    Collection Time: 04/25/22  1:56 PM   Result Value Ref Range    Seg Neutrophils 59 36 - 65 %    Lymphocytes 28 24 - 43 %    Monocytes 9 3 - 12 %    Eosinophils % 2 1 - 4 %    Basophils 1 0 - 2 %    Immature Granulocytes 1 (H) 0 %    Segs Absolute 4.65 1.50 - 8.10 k/uL    Absolute Lymph # 2.20 1.10 - 3.70 k/uL    Absolute Mono # 0.66 0.10 - 1.20 k/uL    Absolute Eos # 0.17 0.00 - 0.44 k/uL    Basophils Absolute 0.08 0.00 - 0.20 k/uL    Absolute Immature Granulocyte 0.04 0.00 - 0.30 k/uL   Basic Metabolic Panel w/ Reflex to MG    Collection Time: 04/25/22  2:15 PM   Result Value Ref Range    Glucose 300 (H) 70 - 99 mg/dL    BUN 13 8 - 23 mg/dL    CREATININE 1.06 (H) 0.50 - 0.90 mg/dL    Calcium 9.2 8.6 - 10.4 mg/dL    Sodium 134 (L) 135 - 144 mmol/L    Potassium 3.8 3.7 - 5.3 mmol/L    Chloride 95 (L) 98 - 107 mmol/L    CO2 25 20 - 31 mmol/L    Anion Gap 14 9 - 17 mmol/L    GFR Non-African American 51 (L) >60 mL/min    GFR African American >60 >60 mL/min    GFR Comment         Troponin    Collection Time: 04/25/22  2:15 PM   Result Value Ref Range    Troponin, High Sensitivity 8 0 - 14 ng/L   Blood Gas, Venous    Collection Time: 04/25/22  2:15 PM   Result Value Ref Range    pH, Onel 7.320 7.320 - 7.420    pCO2, Onel 51.6 39 - 55    pO2, Onel 58.6 (H) 30 - 50    HCO3, Venous 25.8 24 - 30 mmol/L    Negative Base Excess, Onel 0.5 0.0 - 2.0 mmol/L    O2 Sat, Onel 90.3 (H) 60.0 - 85.0 % Carboxyhemoglobin 2.7 0 - 5 %    Pt Temp 37.0     FIO2 UNKNOWN        Imaging/Diagnostics:  XR CHEST PORTABLE    Result Date: 4/25/2022  No acute process. Assessment :      Hospital Problems           Last Modified POA    * (Principal) COPD exacerbation ([facility] 4/25/2022 Yes    Type 2 diabetes mellitus with diabetic polyneuropathy, with long-term current use of insulin (HCC) (Chronic) 4/25/2022 Yes    GERD (gastroesophageal reflux disease) (Chronic) 4/25/2022 Yes    HTN (hypertension), benign (Chronic) 4/25/2022 Yes    Mixed hyperlipidemia (Chronic) 4/25/2022 Yes    Overview Signed 9/7/2015  4:50 AM by [facility]     replace inactive diagnosis         Asthma with COPD ([facility] 4/25/2022 Yes    Obesity, Class III, BMI 40-49.9 (morbid obesity) ([facility] 4/25/2022 Yes    Stage 3 chronic kidney disease ([facility] 4/25/2022 Yes    Benign hypertension with CKD (chronic kidney disease) stage III ([facility] 4/25/2022 Yes          Plan:     Patient status inpatient in the Med/Surge    COPD exacerbation: Supplemental oxygen as needed. Respiratory consult for inhaler protocol. Continue home inhalers as well. Continuous pulse oximetry. Pending respiratory culture. Steroids and antibiotic therapy. Trend WBC. Manage DM: Hod home Metformin. Accu checks AC/hs with SSI and hypoglycemia treatment orders as needed. Carb control diet. Trend kidney function. Avoid nephrotoxic agents. Renal dosing for any medications. I/O. Manage HTN:  Continue home antihypertensives. Add prn for SBP > 160. GERD/GI prophylaxis: Regular diet. Protonix daily. DVT prophylaxis: Continue ASA and Plavix, EPC's while in bed. Encourage/educate on health diet, weight loss and weight management. PT/OT eval and treat. Case management for discharge planning and disposition. Plans to return home.   Full Code        Consultations:   IP CONSULT TO HOSPITALIST     Patient is admitted as inpatient status because of co-morbidities listed above, severity of signs and symptoms as outlined, requirement for current medical therapies and most importantly because of direct risk to patient if care not provided in a hospital setting. Expected length of stay > 48 hours.     VERONIKA Levine - [place]  4/25/2022  5:21 PM    Copy sent to Dr. Melvin Mejia MD no

## 2023-11-03 NOTE — PROGRESS NOTE ADULT - SUBJECTIVE AND OBJECTIVE BOX
[facility]  INFECTIOUS DISEASES - [address], [place] [address]  Tel: [phone]     Fax: [phone]  =======================================================    ALICIA HERNANDEZ [id number]    Follow up: No fevers. Denies any pain. Denies any SOB, nausea or diarrhea.    Allergies:  No Known Allergies      Antibiotics:  acetaminophen     Tablet .. 650 milliGRAM(s) Oral every 6 hours PRN  enoxaparin Injectable 40 milliGRAM(s) SubCutaneous every 24 hours  losartan 25 milliGRAM(s) Oral daily  nystatin    Suspension [id number] Unit(s) Oral daily  OLANZapine Injectable 2.5 milliGRAM(s) IntraMuscular every 6 hours PRN  piperacillin/tazobactam IVPB.. 3.375 Gram(s) IV Intermittent every 8 hours  QUEtiapine 25 milliGRAM(s) Oral at bedtime  tamsulosin 0.4 milliGRAM(s) Oral at bedtime       REVIEW OF SYSTEMS:  Limited 2/2 mental status, as per HPI     Physical Exam:  ICU Vital Signs Last 24 Hrs  T(C): 36.4 (03 Nov 2023 11:46), Max: 36.5 (03 Nov 2023 05:30)  T(F): 97.6 (03 Nov 2023 11:46), Max: 97.7 (03 Nov 2023 05:30)  HR: 79 (03 Nov 2023 11:46) (67 - 80)  BP: 133/60 (03 Nov 2023 11:46) (119/63 - 133/60)  BP(mean): --  ABP: --  ABP(mean): --  RR: 21 (03 Nov 2023 11:46) (18 - 21)  SpO2: 94% (03 Nov 2023 11:46) (93% - 94%)    O2 Parameters below as of 03 Nov 2023 11:46  Patient On (Oxygen Delivery Method): room air      GEN: NAD  HEENT: normocephalic and atraumatic.   NECK: Supple.   LUNGS: Normal respiratory effort  HEART: Regular rate and rhythm   ABDOMEN: Soft, nontender, and nondistended.    EXTREMITIES: No leg edema.  NEUROLOGIC: Answering some simple questions  SKIN: (+) large ulceration on posterior L lower calf  (+) L hallux ulcer      Labs:  11-02    140  |  108  |  21  ----------------------------<  118<H>  3.7   |  26  |  0.95    Ca    8.8      02 Nov 2023 08:20                            12.6   8.02  )-----------( 330      ( 02 Nov 2023 08:20 )             39.5       Urinalysis Basic - ( 02 Nov 2023 08:20 )    Color: x / Appearance: x / SG: x / pH: x  Gluc: 118 mg/dL / Ketone: x  / Bili: x / Urobili: x   Blood: x / Protein: x / Nitrite: x   Leuk Esterase: x / RBC: x / WBC x   Sq Epi: x / Non Sq Epi: x / Bacteria: x          RECENT CULTURES:  10-31 @ 15:19 .Tissue Other, LEFT ANKLE TISSUE C&S     No growth to date.    No polymorphonuclear leukocytes seen per low power field  No organisms seen per oil power field      10-24 @ 07:00 Wound Wound Pseudomonas aeruginosa    Few Pseudomonas aeruginosa        10-23 @ 21:50 .Blood Blood-Peripheral     No growth at 5 days              All imaging and data are reviewed.

## 2023-11-03 NOTE — DISCHARGE NOTE PROVIDER - NSDCMRMEDTOKEN_GEN_ALL_CORE_FT
acetaminophen 325 mg oral tablet: 2 tab(s) orally every 6 hours As needed Temp greater or equal to 38C (100.4F), Mild Pain (1 - 3)  levoFLOXacin 750 mg oral tablet: 1 tab(s) orally every other day 750mg q 48 hours until 11/20  losartan 25 mg oral tablet: 1 tab(s) orally once a day  QUEtiapine 25 mg oral tablet: 1 tab(s) orally once a day (at bedtime)  tamsulosin 0.4 mg oral capsule: 1 cap(s) orally once a day (at bedtime)

## 2023-11-03 NOTE — DISCHARGE NOTE PROVIDER - CARE PROVIDER_API CALL
Brian Bergeron  Family Medicine  [address] [place] [address], [address]  [place] [address]  Phone: [phone]  Fax: [phone]  Follow Up Time:     Su Azar  Internal Medicine  [address] [place] [id number]  Phone: [phone]  Fax: [phone]  Follow Up Time:

## 2023-11-03 NOTE — DISCHARGE NOTE PROVIDER - NSDCCPCAREPLAN_GEN_ALL_CORE_FT
PRINCIPAL DISCHARGE DIAGNOSIS  Diagnosis: Leg wound, left  Assessment and Plan of Treatment: MRI foot showing possible osteomyelitis. LLE wound, significant drainage, circumferentially affected the ankle  -s/p debridement with podiatry on 10/31  -PICC line placed for IV Zosyn for 4 weeks.   Follow up with your primary care physician within the week  -Follow up with infectious disease for weekly blood work while on IV antibiotics     PRINCIPAL DISCHARGE DIAGNOSIS  Diagnosis: Leg wound, left  Assessment and Plan of Treatment: MRI foot showing possible osteomyelitis. LLE wound, significant drainage, circumferentially affected the ankle  -s/p debridement with podiatry on 10/31  -Started on IV Zosyn switched to PO levofloxacin 750mg PO q48h (for CrCl 46) until 11/20 to complete 4 weeks  -Atleast weekly CBC, CMP upon discharge while on antibiotics  -Weekly EKG to monitor QTc  Follow up with your primary care physician within the week  -Follow up with infectious disease for weekly blood work while on IV antibiotics      SECONDARY DISCHARGE DIAGNOSES  Diagnosis: New onset atrial fibrillation  Assessment and Plan of Treatment: elevated chadsvasc score. Currently on sinus rhythm  - cardio following - patient and son declining AC and norbert blockers at this time  -currently rate controlled off medications  - Follow up with cardio within the week for further management

## 2023-11-03 NOTE — DISCHARGE NOTE NURSING/CASE MANAGEMENT/SOCIAL WORK - PATIENT PORTAL LINK FT
You can access the FollowMyHealth Patient Portal offered by [facility] by registering at the following website: [email or web address]. By joining [facility]’s FollowMyHealth portal, you will also be able to view your health information using other applications (apps) compatible with our system.

## 2023-11-03 NOTE — PROGRESS NOTE ADULT - PROBLEM SELECTOR PLAN 1
LLE wound, significant drainage, circumferentially affected the ankle  - ID consult appreciated  - Wound culture with few pseudomonas which is pansensitive  - Currently on Zosyn - plan for PICC line and 4 weeks of IV Zosyn  - elevated inflammatory markers noted  - Podiatry consult noted  - Left US: "Elevated velocities in the peroneal artery suggestive of a stenosis. No evidence of occlusion. Diffuse monophasic flow below the knee suggestive of underlying disease."  - Vascular consulted and no further intervention given comorbidities  - MRI left foot/ankle appreciated: possible osteo.   s/p surgical debridement 10/31 cultures reviewed  - PT rec ANGIE

## 2023-11-03 NOTE — PROGRESS NOTE ADULT - PROBLEM SELECTOR PLAN 7
DVT ppx: Lovenox 40mg qd      discussed with son Hilario aware and in agreement with above. Plan for PICC line and IV Zosyn x 4 weeks and ANGIE

## 2023-11-03 NOTE — PROGRESS NOTE ADULT - NS ATTEND AMEND GEN_ALL_CORE FT
89-year-old male with PMHx of SCC, HTN , spinal stenosis presents to the emergency department with son with report of left lower extremity infection concern for OM now with concern for afib on his EKG.    Tele, EKG's reviewed, most consistent with SR with PACs  No need for AC  s/p OR with Podiatry on 10/31.  no cv complications  cont bp meds. titrate up as tolerated. pain control.
89-year-old male with PMHx of SCC, HTN , spinal stenosis presents to the emergency department with son with report of left lower extremity infection concern for OM now with concern for afib on his EKG.     Serial EKGs reviewed. Initial EKGs done on 10/24 show an irregular rhythm though p waves are noted in lead V3. Could represent afib vs SR with mobitz I and PACs.   Repeat EKGs and rhythm strips show sinus rhythm with first degree and frequent PACs. Rhythm strip shows clear p waves to me with multiple PACs.   Discussed with son, who does not want to proceed with AC at this time given his father lives alone and is noncompliant with medications.   Will continue to monitor closely.
89-year-old male with PMHx of SCC, HTN , spinal stenosis presents to the emergency department with son with report of left lower extremity infection concern for OM now with concern for afib on his EKG.    Tele, EKG's reviewed, most consistent with SR with PACs  No need for AC  Planned for OR with Podiatry on 10/31. He is optimized from a cardiac standpoint and has no evidence of volume overload, active ischemia or uncontrolled arrhythmia.
89-year-old male with PMHx of SCC, HTN , spinal stenosis presents to the emergency department with son with report of left lower extremity infection concern for OM now with concern for afib on his EKG.    Tele, EKG's reviewed, most consistent with SR with PACs  No need for AC  s/p OR with Podiatry on 10/31.  no cv complications
I have personally seen and examined the patient in detail.  I have spoken to the provider regarding the assessment and plan of care.  I have made changes to the note accordingly.
89-year-old male with PMHx of SCC, HTN , spinal stenosis presents to the emergency department with son with report of left lower extremity infection concern for OM now with concern for afib on his EKG.    Tele, EKG's reviewed, most consistent with SR with PACs  No need for AC  Planned for OR with Podiatry on 10/31. He is optimized from a cardiac standpoint and has no evidence of volume overload, active ischemia or uncontrolled arrhythmia.
89-year-old male with PMHx of SCC, HTN , spinal stenosis presents to the emergency department with son with report of left lower extremity infection concern for OM now with concern for afib on his EKG.    Tele, EKG's reviewed, most consistent with SR with PACs  No need for AC  s/p OR with Podiatry on 10/31.  no cv complications  cont bp meds. titrate up as tolerated. pain control.
89-year-old male with PMHx of SCC, HTN , spinal stenosis presents to the emergency department with son with report of left lower extremity infection concern for OM now with concern for afib on his EKG.     sr pacs  no need for ac, which he was not a good candidate for anyway  cont to monitor
I have personally seen and examined the patient in detail.  I have spoken to the provider regarding the assessment and plan of care.  I have made changes to the note accordingly.
89-year-old male with PMHx of SCC, HTN , spinal stenosis presents to the emergency department with son with report of left lower extremity infection concern for OM now with concern for afib on his EKG.     Serial EKGs reviewed. Initial EKGs done on 10/24 show an irregular rhythm though p waves are noted in lead V3. Could represent afib vs SR with mobitz I and PACs.   Repeat EKGs and rhythm strips show sinus rhythm with first degree and frequent PACs. Rhythm strip shows clear p waves to me with multiple PACs.   Discussed with son, who does not want to proceed with AC at this time given his father lives alone and is noncompliant with medications.     Would obtain TFT's, TTE. Would place on telemetry so we can elucidate his rhythm further but this would not change mgmt as the family does not want AC at this time.     Will continue to monitor closely.

## 2023-11-03 NOTE — PROGRESS NOTE ADULT - PROBLEM SELECTOR PROBLEM 1
Leg wound, left
Wound of left ankle
Leg wound, left

## 2023-11-03 NOTE — PROGRESS NOTE ADULT - ASSESSMENT
89-year-old male with PMHx of SCC, HTN , spinal stenosis presents to the emergency department with son with report of left lower extremity infection concern for OM now with concern for afib on his EKG.     Abnormal EKG, possible Afib, post optimization   - s/p selective debridement of wound (10/31) tolerated well from CV POV, podiatry following     - EKG: SR with PACs  - no need for AC    - No sign of volume overload, non orthopneic  - No O2 requirement laying flat    - BP controlled   - Continue Losartan     - Vascular consulted and no further intervention given comorbidities  - MRI left foot/ankle: possible osteo.   - on abx per ID     - Monitor and replete lytes, keep K>4, Mg>2.  - Will continue to follow.    Kassidy Antonio NP  Nurse Practitioner- Cardiology   Call TEAMS

## 2023-11-03 NOTE — PROGRESS NOTE ADULT - ASSESSMENT
89-year-old male with PMHx of SCC, who presented with left lower extremity wound. Concern for infected LLE ulcer. Wound culture grew pseudomonas. MRI showed no evidence of osteomyelitis underneath posterior ankle wound, but noted to have concern for osteomyelitis of left hallux.    S/p LLE wound debridement and L hallux bone biopsy 10/31. OR cultures remain no growth and path negative for osteomyelitis of proximal/distal phalanx of L 1st toe. No fevers and leukocytosis resolved.    Had discussions with son yesterday and today. Plan is d/c to rehab but based on biopsy result he wants to hold off on PICC. Discussed with him that given extent of wound and MRI findings patient will still need a longer duration of antibiotics. Risks of giving fluoroquinolone explained, although for now son still prefers to do oral antibiotics on discharge. Last ECG shows QTC of 482.    #LLE infected ulcer  #L hallux osteomyelitis  #Leukocytosis    -continue Zosyn while inpatient, upon discharge switch to levofloxacin 750mg PO q48h (for CrCl 46) until 11/20 to complete 4 weeks  -repeat ECG   -suggest at least weekly CBC, CMP upon discharge while on antibiotics  -wound care  -discussed with son over the phone  -discussed with Dr. Haja Azar MD  Division of Infectious Diseases   Cell [phone] between 8am and 6pm   After 6pm and weekends please call ID service at [phone].

## 2023-11-03 NOTE — PROGRESS NOTE ADULT - TIME-BASED BILLING (NON-CRITICAL CARE)
no
Time-based billing (NON-critical care)

## 2023-11-03 NOTE — DISCHARGE NOTE PROVIDER - NSDCFUADDINST_GEN_ALL_CORE_FT
Easy to Chew:   Mildly Thick Liquids (MILDTHICKLIQS)  Supplement Feeding Modality:  Oral  Glucerna Shake Cans or Servings Per Day:  1       Frequency:  Daily  Ensure Max Cans or Servings Per Day:  1       Frequency:  Daily

## 2023-11-03 NOTE — PROGRESS NOTE ADULT - TIME BILLING
Reviewing chart notes and data, face to face time counseling the patient, communicating with Dr. Azar ID - pathology reviewed plan for PICC with IV Zosyn for 4 weeks while in [facility], coordinating care with SW/CM at [facility].

## 2023-11-03 NOTE — DISCHARGE NOTE PROVIDER - HOSPITAL COURSE
89-year-old male with PMHx of SCC presents to the emergency department with son with report of left lower extremity wound with exposed bone/tendon. LLE wound, significant drainage, circumferentially affected the ankle. Left US: "Elevated velocities in the peroneal artery suggestive of a stenosis. No evidence of occlusion. Diffuse monophasic flow below the knee suggestive of underlying disease. MRI left foot/ankle appreciated: possible osteo. Vascular consulted and no further intervention given comorbidities.   podiatry following recommendations appreciated. s/p debridement 10/31. cultures reviewed.  ID dr baxter following plan for PICC line and IV Zosyn 4 weeks.     Also found to have New onset atrial fibrillation. Elevated chadsvasc score. pt is poor candidate for long term AC - spoke w/ pt's son (HCP) and he agrees that therapeutic AC risk>benefit. will plan for pharmacologic VTE ppx while admitted then will not continue AC at time of dc  Pt does not wish to take any meds - he never took his BP meds historically due to "fatigue" and he will unlikely take any norbert blockers if recommended - family agrees  Cardio following, recommendations appreciated       89-year-old male with PMHx of SCC presents to the emergency department with son with report of left lower extremity wound with exposed bone/tendon. LLE wound, significant drainage, circumferentially affected the ankle. Left US: "Elevated velocities in the peroneal artery suggestive of a stenosis. No evidence of occlusion. Diffuse monophasic flow below the knee suggestive of underlying disease. MRI left foot/ankle appreciated: possible osteo. Vascular consulted and no further intervention given comorbidities.   podiatry following recommendations appreciated. s/p debridement 10/31. cultures reviewed.  ID dr baxter following continue Zosyn while inpatient, upon discharge switch to levofloxacin 750mg PO q48h (for CrCl 46) until 11/20 to complete 4 weeks  suggest at least weekly CBC, CMP upon discharge while on antibiotics    Also found to have New onset atrial fibrillation. Elevated chadsvasc score. pt is poor candidate for long term AC - spoke w/ pt's son (HCP) and he agrees that therapeutic AC risk>benefit. will plan for pharmacologic VTE ppx while admitted then will not continue AC at time of dc  Pt does not wish to take any meds - he never took his BP meds historically due to "fatigue" and he will unlikely take any norbert blockers if recommended - family agrees  Cardio following, recommendations appreciated        Time Spent: 40 minutes

## 2023-11-03 NOTE — SOCIAL WORK PROGRESS NOTE - NSSWPROGRESSNOTE_GEN_ALL_CORE
Patient to be discharged to [facility] rehab via [facility] ambulance. All in agreement. Copy of chart to follow

## 2023-11-03 NOTE — PROGRESS NOTE ADULT - PROVIDER SPECIALTY LIST ADULT
Cardiology
Cardiology
Infectious Disease
Internal Medicine
Internal Medicine
Cardiology
Infectious Disease
Internal Medicine
Internal Medicine
Cardiology
Infectious Disease
Podiatry
Cardiology
Infectious Disease
Internal Medicine
Hospitalist
Internal Medicine

## 2023-11-03 NOTE — PROGRESS NOTE ADULT - SUBJECTIVE AND OBJECTIVE BOX
[facility] -- Morgan Mccarthy,  Genny, Segun Kunz Savella, Goodger  Office # [phone]    Follow Up:  Cardiac Arrythmia     Subjective/Observations: No events overnight resting comfortably in bed. Pt is on enhanced supervision. Unable to obtain meaningful ROS. appears NAD.       REVIEW OF SYSTEMS: All other review of systems is negative unless indicated above  PAST MEDICAL & SURGICAL HISTORY:  No pertinent past medical history      H/O hernia repair        MEDICATIONS  (STANDING):  enoxaparin Injectable 40 milliGRAM(s) SubCutaneous every 24 hours  losartan 25 milliGRAM(s) Oral daily  nystatin    Suspension 047166 Unit(s) Oral daily  piperacillin/tazobactam IVPB.. 3.375 Gram(s) IV Intermittent every 8 hours  QUEtiapine 25 milliGRAM(s) Oral at bedtime  tamsulosin 0.4 milliGRAM(s) Oral at bedtime    MEDICATIONS  (PRN):  acetaminophen     Tablet .. 650 milliGRAM(s) Oral every 6 hours PRN Temp greater or equal to 38C (100.4F), Mild Pain (1 - 3)  OLANZapine Injectable 2.5 milliGRAM(s) IntraMuscular every 6 hours PRN agitation    Allergies    No Known Allergies    Intolerances      Vital Signs Last 24 Hrs  T(C): 36.5 (03 Nov 2023 05:30), Max: 36.5 (02 Nov 2023 12:55)  T(F): 97.7 (03 Nov 2023 05:30), Max: 97.7 (02 Nov 2023 12:55)  HR: 80 (03 Nov 2023 05:30) (67 - 80)  BP: 119/63 (03 Nov 2023 05:30) (119/63 - 131/70)  BP(mean): --  RR: 18 (03 Nov 2023 05:30) (17 - 18)  SpO2: 94% (03 Nov 2023 05:30) (93% - 95%)    Parameters below as of 03 Nov 2023 05:30  Patient On (Oxygen Delivery Method): room air      I&O's Summary      TELE: Not on telemetry   PHYSICAL EXAM:  Constitutional: NAD, awake and alert  HEENT: Moist Mucous Membranes, Anicteric  Pulmonary: Non-labored, breath sounds are clear bilaterally, No wheezing, rales or rhonchi  Cardiovascular: RRR, S1 and S2, No murmurs, rubs, gallops or clicks  Gastrointestinal: Bowel Sounds present, soft, nontender.   Lymph: No peripheral edema. No lymphadenopathy.  Skin: LLE dressing CDI   Psych:  Mood & affect appropriate    LABS: All Labs Reviewed:                        12.6   8.02  )-----------( 330      ( 02 Nov 2023 08:20 )             39.5                         13.5   17.72 )-----------( 352      ( 01 Nov 2023 07:55 )             41.7     02 Nov 2023 08:20    140    |  108    |  21     ----------------------------<  118    3.7     |  26     |  0.95   01 Nov 2023 07:55    142    |  105    |  18     ----------------------------<  114    4.7     |  27     |  1.00     Ca    8.8        02 Nov 2023 08:20  Ca    9.1        01 Nov 2023 07:55            12 Lead ECG:   Ventricular Rate 93 BPM    Atrial Rate 93 BPM    P-R Interval 184 ms    QRS Duration 112 ms    Q-T Interval 388 ms    QTC Calculation(Bazett) 482 ms    P Axis 65 degrees    R Axis -58 degrees    T Axis 67 degrees    Diagnosis Line Sinus rhythm withpremature supraventricular complexes and with occasional premature ventricular complexes  Left anterior fascicular block  Moderate voltage criteria for LVH, may be normal variant ( R in aVL , Rangely product )  Possible Anterior infarct (cited on or before 25-OCT-2023)  Abnormal ECG  Confirmed by doug Harrell ([id number]) on 10/26/2023 3:27:22 PM (10-25-23 @ 14:00)

## 2023-11-03 NOTE — PROGRESS NOTE ADULT - SUBJECTIVE AND OBJECTIVE BOX
Patient is a 89y old  Male who presents with a chief complaint of Left leg wound (03 Nov 2023 08:46)      INTERVAL HPI/OVERNIGHT EVENTS: Patient seen and examined at bedside. No overnight events.    MEDICATIONS  (STANDING):  enoxaparin Injectable 40 milliGRAM(s) SubCutaneous every 24 hours  losartan 25 milliGRAM(s) Oral daily  nystatin    Suspension [id number] Unit(s) Oral daily  piperacillin/tazobactam IVPB.. 3.375 Gram(s) IV Intermittent every 8 hours  QUEtiapine 25 milliGRAM(s) Oral at bedtime  tamsulosin 0.4 milliGRAM(s) Oral at bedtime    MEDICATIONS  (PRN):  acetaminophen     Tablet .. 650 milliGRAM(s) Oral every 6 hours PRN Temp greater or equal to 38C (100.4F), Mild Pain (1 - 3)  OLANZapine Injectable 2.5 milliGRAM(s) IntraMuscular every 6 hours PRN agitation      Allergies    No Known Allergies    Intolerances      Vital Signs Last 24 Hrs  T(C): 36.5 (03 Nov 2023 05:30), Max: 36.5 (02 Nov 2023 12:55)  T(F): 97.7 (03 Nov 2023 05:30), Max: 97.7 (02 Nov 2023 12:55)  HR: 80 (03 Nov 2023 05:30) (67 - 80)  BP: 119/63 (03 Nov 2023 05:30) (119/63 - 131/70)  BP(mean): --  RR: 18 (03 Nov 2023 05:30) (17 - 18)  SpO2: 94% (03 Nov 2023 05:30) (93% - 95%)    Parameters below as of 03 Nov 2023 05:30  Patient On (Oxygen Delivery Method): room air      I&O's Summary    BMI (kg/m2): 24.5 (11-01-23 @ 20:23)    PHYSICAL EXAM:  GENERAL: NAD  HEENT:  AT/NC, anicteric, moist mucous membranes, EOMI, PERRL, no lid-lag, conjunctiva and sclera clear  CHEST/LUNG:  CTA b/l, no rales, wheezes, or rhonchi,  normal respiratory effort, no intercostal retractions  HEART:  RRR, S1, S2, no murmurs; no pitting edema  ABDOMEN:  BS+, soft, nontender, nondistended  MSK/EXTREMITIES: faint/palpable peripheral pulses, no clubbing or cyanosis; dressing c/d/i  NERVOUS SYSTEM: answers questions and follows commands appropriately, A&Ox2-3, forgetful, grossly moves all extremities   PSYCH: Appropriate affect, Alert & Awake; fair judgement    LABS: Personally reviewed  CBC                        12.6   8.02  )-----------( 330      ( 02 Nov 2023 08:20 )             39.5     CMP  11-02    140  |  108  |  21  ----------------------------<  118  3.7   |  26  |  0.95    Ca    8.8      02 Nov 2023 08:20                                        Urinalysis Basic - ( 02 Nov 2023 08:20 )    Color: x / Appearance: x / SG: x / pH: x  Gluc: 118 mg/dL / Ketone: x  / Bili: x / Urobili: x   Blood: x / Protein: x / Nitrite: x   Leuk Esterase: x / RBC: x / WBC x   Sq Epi: x / Non Sq Epi: x / Bacteria: x        Culture - Tissue with Gram Stain (collected 31 Oct 2023 15:19)  Source: .Tissue Other, LEFT GREATTOE PROXIMAL PHALANX  Gram Stain (01 Nov 2023 00:35):    No polymorphonuclear cells seen per low power field    No organisms seen per oil power field  Preliminary Report (01 Nov 2023 16:27):    No growth to date.    Culture - Tissue with Gram Stain (collected 31 Oct 2023 15:19)  Source: .Tissue Other, LEFT GREAT TOE DISTAL P[HALANX  Gram Stain (01 Nov 2023 00:33):    No polymorphonuclear cells seen per low power field    No organisms seen per oil power field  Preliminary Report (01 Nov 2023 16:29):    No growth to date.    Culture - Tissue with Gram Stain (collected 31 Oct 2023 15:19)  Source: .Tissue Other, LEFT ANKLE TISSUE C&amp;S  Gram Stain (31 Oct 2023 23:24):    No polymorphonuclear leukocytes seen per low power field    No organisms seen per oil power field  Preliminary Report (01 Nov 2023 16:31):    No growth to date.            Culture - Tissue with Gram Stain (collected 10-31-23 @ 15:19)  Source: .Tissue Other, LEFT GREATTOE PROXIMAL PHALANX  Gram Stain (11-01-23 @ 00:35):    No polymorphonuclear cells seen per low power field    No organisms seen per oil power field  Preliminary Report (11-01-23 @ 16:27):    No growth to date.    Culture - Tissue with Gram Stain (collected 10-31-23 @ 15:19)  Source: .Tissue Other, LEFT GREAT TOE DISTAL P[HALANX  Gram Stain (11-01-23 @ 00:33):    No polymorphonuclear cells seen per low power field    No organisms seen per oil power field  Preliminary Report (11-01-23 @ 16:29):    No growth to date.    Culture - Tissue with Gram Stain (collected 10-31-23 @ 15:19)  Source: .Tissue Other, LEFT ANKLE TISSUE C&amp;S  Gram Stain (10-31-23 @ 23:24):    No polymorphonuclear leukocytes seen per low power field    No organisms seen per oil power field  Preliminary Report (11-01-23 @ 16:31):    No growth to date.        RADIOLOGY & ADDITIONAL TESTS: Personally reviewed.     Consultant(s) Notes Reviewed:  [x] YES  [ ] NO   Discussed with CALVIN/ERA, RN

## 2023-11-03 NOTE — PROGRESS NOTE ADULT - REASON FOR ADMISSION
Left leg wound

## 2023-11-05 LAB
CULTURE RESULTS: SIGNIFICANT CHANGE UP
SPECIMEN SOURCE: SIGNIFICANT CHANGE UP

## 2023-11-10 ENCOUNTER — TRANSCRIPTION ENCOUNTER (OUTPATIENT)
Age: 88
End: 2023-11-10

## 2023-11-17 ENCOUNTER — TRANSCRIPTION ENCOUNTER (OUTPATIENT)
Age: 88
End: 2023-11-17

## 2023-11-24 ENCOUNTER — TRANSCRIPTION ENCOUNTER (OUTPATIENT)
Age: 88
End: 2023-11-24

## 2023-12-08 ENCOUNTER — TRANSCRIPTION ENCOUNTER (OUTPATIENT)
Age: 88
End: 2023-12-08

## 2023-12-15 ENCOUNTER — TRANSCRIPTION ENCOUNTER (OUTPATIENT)
Age: 88
End: 2023-12-15

## 2023-12-22 ENCOUNTER — TRANSCRIPTION ENCOUNTER (OUTPATIENT)
Age: 88
End: 2023-12-22

## 2024-07-13 NOTE — PROGRESS NOTE ADULT - PROBLEM/PLAN-6
DISPLAY PLAN FREE TEXT
Thank you for seeking care at [facility] Emergency Department.  You have been seen and evaluated after an injury that resulted in a laceration.  As we discussed your x-ray showed a tiny chip fracture otherwise known as avulsion fracture.  You were given your first dose of antibiotics in the ER and please take the rest for the next 7 days.  Follow-up with a hand surgeon within 7 days.  Please call Monday first thing to schedule expedited follow-up of your finger laceration.     We reviewed the results from your visit in the emergency department.   Please read the instructions provided   If given prescriptions, take as instructed.    Please return to the emergency department or to your primary care doctor in 7 days to have your sutures/staples removed.     Return to the emergency department earlier if you develop fevers, if you see pus coming from the wound, or if you develop increasing redness around the wound as these can be signs of wound infection.     Please keep the wound covered in the provided dressing for the first 24 hours. After that, you may remove the dressing and wash the area with normal soap and water. Please avoid aggressive scrubbing as this may disrupt the sutures/staples. Please keep the area clean and dry. You can use normal bandages or gauze/tape as needed to keep the wound protected.   After the wound has healed, use sunscreen to avoid significant scarring.     Remember, your care process does not end after your visit today. Please follow-up with your doctor within 1-2 days for a follow-up check to ensure you are  improving, to see if you need any further evaluation/testing, or to evaluate for any alternate diagnoses.    Your blood pressure was noted to be elevated in the ER today. Please follow up with your primary doctor within the next 2-3 months for a reevaluation. Uncontrolled high blood pressure can lead to strokes, heart attacks, and kidney failure.     Please return to the emergency 
department immediately for any fevers, if you see pus coming from the wound, increasing redness, discoloration, increasing pain, wound  open, numbness, tingling or weakness, new or worsening symptoms as discussed as these could be signs of more serious medical illness.    We hope you feel better.    
DISPLAY PLAN FREE TEXT

## 2024-10-07 NOTE — CAREGIVER ENGAGEMENT NOTE - CAREGIVER OUTREACH - FIRST ATTEMPT
Called the patient to scheduled an appt the patient did not answer I left a voicemail for her to call the office back    Successfully contacted

## 2024-11-14 NOTE — ED ADULT NURSE NOTE - NS ED NOTE ABUSE RESPONSE YN
Consult received for Vascular access care. Cancelled after arrival.  Patient will shower and RN will call later (within 4 hours) for PIV placement   Yes

## (undated) DEVICE — PLV-SCD MACHINE: Type: DURABLE MEDICAL EQUIPMENT

## (undated) DEVICE — DRSG XEROFORM 1 X 8"

## (undated) DEVICE — PACK LOWER EXTREMITY NS PLAINVI

## (undated) DEVICE — PREP BETADINE KIT

## (undated) DEVICE — DRSG WEBRIL 6"

## (undated) DEVICE — DRSG TELFA 3 X 8

## (undated) DEVICE — GLV 7.5 PROTEXIS (CREAM) NEU-THERA

## (undated) DEVICE — PACKING GAUZE PLAIN 0.5"

## (undated) DEVICE — BLADE SCALPEL SAFETYLOCK #15

## (undated) DEVICE — VENODYNE/SCD SLEEVE CALF MEDIUM

## (undated) DEVICE — DRSG CURITY GAUZE SPONGE 4 X 4" 12-PLY

## (undated) DEVICE — PACKING GAUZE IODOFORM 1"

## (undated) DEVICE — S&N VERSAJET II EXACT HANDPIECE 14MM X 45 DEGREE

## (undated) DEVICE — DRSG COMBINE 5X9"

## (undated) DEVICE — VENODYNE/SCD SLEEVE CALF LARGE

## (undated) DEVICE — WARMING BLANKET UPPER ADULT

## (undated) DEVICE — NDL VITOSS BONE MARROW 8GAX6CM

## (undated) DEVICE — BUR MICROAIRE CARBIDE OVAL 4MM 8 FLUTE